# Patient Record
Sex: FEMALE | Race: WHITE | NOT HISPANIC OR LATINO | Employment: OTHER | URBAN - METROPOLITAN AREA
[De-identification: names, ages, dates, MRNs, and addresses within clinical notes are randomized per-mention and may not be internally consistent; named-entity substitution may affect disease eponyms.]

---

## 2017-01-29 ENCOUNTER — DOCUMENTATION ONLY (OUTPATIENT)
Dept: FAMILY MEDICINE | Facility: CLINIC | Age: 72
End: 2017-01-29

## 2017-01-29 NOTE — PROGRESS NOTES
Pre-Visit Chart Review  For Appointment Scheduled on 01/30/2017    Health Maintenance Due   Topic Date Due    Hepatitis C Screening  1945    Mammogram  11/09/1986    DEXA SCAN  11/09/1986    Colonoscopy  11/09/1996    Zoster Vaccine  11/09/2006    Pneumococcal (65+) (2 of 2 - PPSV23) 10/19/2016

## 2017-01-30 ENCOUNTER — OFFICE VISIT (OUTPATIENT)
Dept: FAMILY MEDICINE | Facility: CLINIC | Age: 72
End: 2017-01-30
Payer: MEDICARE

## 2017-01-30 VITALS
WEIGHT: 147.44 LBS | DIASTOLIC BLOOD PRESSURE: 72 MMHG | SYSTOLIC BLOOD PRESSURE: 125 MMHG | BODY MASS INDEX: 25.17 KG/M2 | HEART RATE: 54 BPM | HEIGHT: 64 IN | TEMPERATURE: 98 F

## 2017-01-30 DIAGNOSIS — L24.9 IRRITANT CONTACT DERMATITIS, UNSPECIFIED TRIGGER: Primary | ICD-10-CM

## 2017-01-30 PROCEDURE — 99213 OFFICE O/P EST LOW 20 MIN: CPT | Mod: PBBFAC,PO | Performed by: PHYSICIAN ASSISTANT

## 2017-01-30 PROCEDURE — 99213 OFFICE O/P EST LOW 20 MIN: CPT | Mod: S$PBB,,, | Performed by: PHYSICIAN ASSISTANT

## 2017-01-30 PROCEDURE — 99999 PR PBB SHADOW E&M-EST. PATIENT-LVL III: CPT | Mod: PBBFAC,,, | Performed by: PHYSICIAN ASSISTANT

## 2017-01-30 RX ORDER — HYDROXYZINE HYDROCHLORIDE 25 MG/1
25 TABLET, FILM COATED ORAL NIGHTLY
Qty: 15 TABLET | Refills: 0 | Status: SHIPPED | OUTPATIENT
Start: 2017-01-30 | End: 2017-08-09

## 2017-01-30 NOTE — PROGRESS NOTES
Subjective:       Patient ID: Liliana Hairston is a 71 y.o. female.    Chief Complaint: Rash (red, itching, right abdominal x 1 week)    Rash   This is a new problem. The current episode started in the past 7 days. The problem is unchanged. The affected locations include the torso. The rash is characterized by itchiness and dryness. Pertinent negatives include no fever. Past treatments include topical steroids. The treatment provided mild relief.     Review of Systems   Constitutional: Negative for chills and fever.   Skin: Positive for rash.       Objective:      Physical Exam   Constitutional: She appears well-developed and well-nourished. No distress.   Skin:        Vitals reviewed.      Assessment:       1. Irritant contact dermatitis, unspecified trigger        Plan:       Liliana was seen today for rash.    Diagnoses and all orders for this visit:    Irritant contact dermatitis, unspecified trigger      -     hydrOXYzine HCl (ATARAX) 25 MG tablet; Take 1 tablet (25 mg total) by mouth every evening.    Continue hydrocortisone bid   Liliana Hairston was given a handout which discussed their disease process, precautions, and instructions for follow-up and therapy.

## 2017-01-30 NOTE — PATIENT INSTRUCTIONS
Nonspecific Dermatitis  Dermatitis is a skin rash caused by something that touches the skin and makes it irritated and inflamed.  Your skin may be red, swollen, dry, and may be cracked. Blisters may form and ooze. The rash will itch.  Dermatitis can form on the face and neck, backs of hands, forearms, genitals, and lower legs. Dermatitis is not passed from person to person.  Talk with your health care provider about what may have caused the rash. Common things that cause skin allergies are metal in jewelry, plants like poison ivy or poison oak, and certain skin care products. You will need to avoid the source of your rash in the future to prevent it from coming back. In some cases, the cause of the dermatitis may not be found.  Treatment is done to relieve itching and prevent the rash from coming back. The rash should go away in a few days to a few weeks.  Home care  The health care provider may prescribe medications to relieve swelling and itching. Follow all instructions when using these medications.  · Avoid anything that heats up your skin, such as hot showers or baths, or direct sunlight. This can make itching worse.  · Stay away from whatever you think caused the rash.  · Bathe in warm, not hot, water. Apply a moisturizing lotion after bathing to prevent dry skin.  · Avoid skin irritants such as wool or silk clothing, grease, oils, harsh soaps, and detergents.  · Apply cold compresses to soothe your sores to help relieve your symptoms. Do this for 30 minutes 3 to 4 times a day. You can make a cold compress by soaking a cloth in cold water. Squeeze out excess water. You can add colloidal oatmeal to the water to help reduce itching. For severe itching in a small area, apply an ice pack wrapped in a thin towel. Do this for 20 minutes 3 to 4 times a day.  · You can also help relieve large areas of itching by taking a lukewarm bath with colloidal oatmeal added to the water.  · Use hydrocortisone cream for redness  and irritation, unless another medicine was prescribed. You can also use benzocaine anesthetic cream or spray.  · Use oral diphenhydramine to help reduce itching. This is an antihistamine you can buy at drug and grocery stores. It can make you sleepy, so use lower doses during the daytime. Or you can use loratadine. This is an antihistamine that will not make you sleepy. Dont use diphenhydramine if you have glaucoma or have trouble urinating because of an enlarged prostate.  · Wash your hands or use an antibacterial gel often to prevent the spread of the rash.  Follow-up care  Follow up with your health care provider. Make an appointment with your health care provider if your symptoms do not get better in the next 1 to 2 weeks.  When to seek medical advice  Call your health care provider right away if any of these occur:  · Spreading of the rash to other parts of your body  · Severe swelling of your face, eyelids, mouth, throat or tongue  · Trouble urinating due to swelling in the genital area  · Fever of 100.4°F (38°C) or higher  · Redness or swelling that gets worse  · Pain that gets worse  · Foul-smelling fluid leaking from the skin  · Yellow-brown crusts on the open blisters  · Joint pain   © 1056-1658 [employer] [address] [place] [address], [place] [address]. All rights reserved. This information is not intended as a substitute for professional medical care. Always follow your healthcare professional's instructions.

## 2017-01-30 NOTE — MR AVS SNAPSHOT
[facility]  [address]  [place] [address]  Phone: [phone]  Fax: [phone]                  Liliana Hairston   2017 8:20 AM   Office Visit    Description:  Female : 1945   Provider:  DARLING Ray   Department:  [facility]           Reason for Visit     Rash           Diagnoses this Visit        Comments    Irritant contact dermatitis, unspecified trigger    -  Primary            To Do List           Goals (5 Years of Data)     None       These Medications        Disp Refills Start End    hydrOXYzine HCl (ATARAX) 25 MG tablet 15 tablet 0 2017     Take 1 tablet (25 mg total) by mouth every evening. - Oral    Pharmacy: [id number]  [address]  #: [phone]         [facility]     [facility] -  Assistance  Registered nurses in the [facility] Center provide clinical advisement, health education, appointment booking, and other advisory services.  Call for this free service at [phone].             Medications           Message regarding Medications     Verify the changes and/or additions to your medication regime listed below are the same as discussed with your clinician today.  If any of these changes or additions are incorrect, please notify your healthcare provider.        START taking these NEW medications        Refills    hydrOXYzine HCl (ATARAX) 25 MG tablet 0    Sig: Take 1 tablet (25 mg total) by mouth every evening.    Class: Normal    Route: Oral           Verify that the below list of medications is an accurate representation of the medications you are currently taking.  If none reported, the list may be blank. If incorrect, please contact your healthcare provider. Carry this list with you in case of emergency.           Current Medications     anastrozole (ARIMIDEX) 1 mg Tab Take 1 mg by mouth once daily.    aspirin (ECOTRIN) 81 MG EC tablet  "Take 81 mg by mouth once daily.    azelastine (ASTELIN) 137 mcg (0.1 %) nasal spray 1 spray (137 mcg total) by Nasal route 2 (two) times daily.    CALCIUM CARBONATE/VITAMIN D3 (CALCIUM 500 + D ORAL) Take by mouth.    cyanocobalamin (VITAMIN B-12) 1000 MCG tablet Take 100 mcg by mouth once daily.    dicyclomine (BENTYL) 10 MG capsule Take 10 mg by mouth 4 (four) times daily before meals and nightly.    glucosamine HCl 1,500 mg Tab Take by mouth.    LACTOBACILLUS ACIDOPHILUS (PROBIOTIC ORAL) Take by mouth.    hydrOXYzine HCl (ATARAX) 25 MG tablet Take 1 tablet (25 mg total) by mouth every evening.           Clinical Reference Information           Vital Signs - Last Recorded  Most recent update: 1/30/2017  8:25 AM by Ayanna De Jesus MA    BP Pulse Temp Ht Wt BMI    125/72 (BP Location: Left arm, Patient Position: Sitting, BP Method: Automatic) (!) 54 98.3 °F (36.8 °C) (Oral) 5' 4" (1.626 m) 66.9 kg (147 lb 7.5 oz) 25.31 kg/m2      Blood Pressure          Most Recent Value    BP  125/72      Allergies as of 1/30/2017     No Known Allergies      Immunizations Administered on Date of Encounter - 1/30/2017     None      Instructions      Nonspecific Dermatitis  Dermatitis is a skin rash caused by something that touches the skin and makes it irritated and inflamed.  Your skin may be red, swollen, dry, and may be cracked. Blisters may form and ooze. The rash will itch.  Dermatitis can form on the face and neck, backs of hands, forearms, genitals, and lower legs. Dermatitis is not passed from person to person.  Talk with your health care provider about what may have caused the rash. Common things that cause skin allergies are metal in jewelry, plants like poison ivy or poison oak, and certain skin care products. You will need to avoid the source of your rash in the future to prevent it from coming back. In some cases, the cause of the dermatitis may not be found.  Treatment is done to relieve itching and prevent the rash " from coming back. The rash should go away in a few days to a few weeks.  Home care  The health care provider may prescribe medications to relieve swelling and itching. Follow all instructions when using these medications.  · Avoid anything that heats up your skin, such as hot showers or baths, or direct sunlight. This can make itching worse.  · Stay away from whatever you think caused the rash.  · Bathe in warm, not hot, water. Apply a moisturizing lotion after bathing to prevent dry skin.  · Avoid skin irritants such as wool or silk clothing, grease, oils, harsh soaps, and detergents.  · Apply cold compresses to soothe your sores to help relieve your symptoms. Do this for 30 minutes 3 to 4 times a day. You can make a cold compress by soaking a cloth in cold water. Squeeze out excess water. You can add colloidal oatmeal to the water to help reduce itching. For severe itching in a small area, apply an ice pack wrapped in a thin towel. Do this for 20 minutes 3 to 4 times a day.  · You can also help relieve large areas of itching by taking a lukewarm bath with colloidal oatmeal added to the water.  · Use hydrocortisone cream for redness and irritation, unless another medicine was prescribed. You can also use benzocaine anesthetic cream or spray.  · Use oral diphenhydramine to help reduce itching. This is an antihistamine you can buy at drug and grocery stores. It can make you sleepy, so use lower doses during the daytime. Or you can use loratadine. This is an antihistamine that will not make you sleepy. Dont use diphenhydramine if you have glaucoma or have trouble urinating because of an enlarged prostate.  · Wash your hands or use an antibacterial gel often to prevent the spread of the rash.  Follow-up care  Follow up with your health care provider. Make an appointment with your health care provider if your symptoms do not get better in the next 1 to 2 weeks.  When to seek medical advice  Call your health care  provider right away if any of these occur:  · Spreading of the rash to other parts of your body  · Severe swelling of your face, eyelids, mouth, throat or tongue  · Trouble urinating due to swelling in the genital area  · Fever of 100.4°F (38°C) or higher  · Redness or swelling that gets worse  · Pain that gets worse  · Foul-smelling fluid leaking from the skin  · Yellow-brown crusts on the open blisters  · Joint pain   © 7135-1405 [employer] [address] [place] [address], [place] [address]. All rights reserved. This information is not intended as a substitute for professional medical care. Always follow your healthcare professional's instructions.

## 2017-08-09 ENCOUNTER — HOSPITAL ENCOUNTER (OUTPATIENT)
Dept: RADIOLOGY | Facility: CLINIC | Age: 72
Discharge: HOME | End: 2017-08-09
Attending: FAMILY MEDICINE
Payer: MEDICARE

## 2017-08-09 ENCOUNTER — OFFICE VISIT (OUTPATIENT)
Dept: FAMILY MEDICINE | Facility: CLINIC | Age: 72
End: 2017-08-09
Payer: MEDICARE

## 2017-08-09 VITALS
RESPIRATION RATE: 18 BRPM | DIASTOLIC BLOOD PRESSURE: 71 MMHG | SYSTOLIC BLOOD PRESSURE: 116 MMHG | BODY MASS INDEX: 25.82 KG/M2 | HEART RATE: 68 BPM | HEIGHT: 64 IN | WEIGHT: 151.25 LBS | TEMPERATURE: 99 F

## 2017-08-09 DIAGNOSIS — M25.561 PAIN IN JOINT OF RIGHT KNEE: Primary | ICD-10-CM

## 2017-08-09 DIAGNOSIS — M25.561 PAIN IN JOINT OF RIGHT KNEE: ICD-10-CM

## 2017-08-09 DIAGNOSIS — Z11.59 NEED FOR HEPATITIS C SCREENING TEST: ICD-10-CM

## 2017-08-09 PROCEDURE — 73560 X-RAY EXAM OF KNEE 1 OR 2: CPT | Mod: 26,59,LT,S$GLB | Performed by: RADIOLOGY

## 2017-08-09 PROCEDURE — 1125F AMNT PAIN NOTED PAIN PRSNT: CPT | Mod: ,,, | Performed by: FAMILY MEDICINE

## 2017-08-09 PROCEDURE — 73560 X-RAY EXAM OF KNEE 1 OR 2: CPT | Mod: TC,PO,LT

## 2017-08-09 PROCEDURE — 3008F BODY MASS INDEX DOCD: CPT | Mod: ,,, | Performed by: FAMILY MEDICINE

## 2017-08-09 PROCEDURE — 1159F MED LIST DOCD IN RCRD: CPT | Mod: ,,, | Performed by: FAMILY MEDICINE

## 2017-08-09 PROCEDURE — 73562 X-RAY EXAM OF KNEE 3: CPT | Mod: 26,RT,S$GLB, | Performed by: RADIOLOGY

## 2017-08-09 PROCEDURE — 99999 PR PBB SHADOW E&M-EST. PATIENT-LVL III: CPT | Mod: PBBFAC,,, | Performed by: FAMILY MEDICINE

## 2017-08-09 PROCEDURE — 99214 OFFICE O/P EST MOD 30 MIN: CPT | Mod: S$PBB,,, | Performed by: FAMILY MEDICINE

## 2017-08-09 NOTE — PROGRESS NOTES
Subjective:       Patient ID: Liliana Hairston is a 71 y.o. female.    Chief Complaint: Knee Pain    Onset 4 days ago.  Was at end of a cruise; had been walking a lot but no type of trauma occurred.  No other joints hurting now and no previous pain like this.      Knee Pain    There was no injury mechanism. The pain is present in the right knee. The quality of the pain is described as aching. The pain is severe. The pain has been constant since onset. Pertinent negatives include no loss of motion, muscle weakness or numbness. The symptoms are aggravated by movement and weight bearing. She has tried NSAIDs, ice and rest for the symptoms. The treatment provided mild relief.     Review of Systems   Constitutional: Negative for fever.   Eyes: Negative for discharge and redness.   Respiratory: Negative for shortness of breath.         No pleuritic pain.   Cardiovascular: Negative for chest pain, palpitations and leg swelling.   Gastrointestinal: Negative for abdominal pain and nausea.   Genitourinary: Negative for dysuria.   Musculoskeletal:        No calf pain.   Skin: Negative for rash.   Neurological: Negative for numbness.   All other systems reviewed and are negative.      Objective:      Physical Exam   Constitutional: She appears well-developed and well-nourished.   Eyes: Pupils are equal, round, and reactive to light. No scleral icterus.   Neck: Neck supple.   Cardiovascular: Normal rate and regular rhythm.    No murmur heard.  Pulmonary/Chest: Effort normal and breath sounds normal.   Musculoskeletal: She exhibits no edema.        Right knee: She exhibits no LCL laxity and no MCL laxity. Tenderness found. Medial joint line tenderness noted.        Legs:  Left >> right knee has palpable crepitance during palpation with ROM.   Lymphadenopathy:     She has no cervical adenopathy.   Skin: Skin is warm and dry.       Assessment:       1. Pain in joint of right knee    2. Need for hepatitis C screening test         Plan:         Liliana was seen today for knee pain.    Diagnoses and all orders for this visit:    Pain in joint of right knee  -     X-Ray Knee 3 View Right; Future  -     Uric acid; Future    Need for hepatitis C screening test  -     Hepatitis C antibody; Future      Patient Instructions   Aleve 1-2 pills twice a day.  OR Ibuprofen 600 mg three times with food.  Rest  Moist heat three times a day.

## 2017-08-09 NOTE — PATIENT INSTRUCTIONS
Aleve 1-2 pills twice a day.  OR Ibuprofen 600 mg three times with food.  Rest  Moist heat three times a day.

## 2017-08-16 ENCOUNTER — OFFICE VISIT (OUTPATIENT)
Dept: ORTHOPEDICS | Facility: CLINIC | Age: 72
End: 2017-08-16
Payer: MEDICARE

## 2017-08-16 VITALS
DIASTOLIC BLOOD PRESSURE: 86 MMHG | BODY MASS INDEX: 25.78 KG/M2 | HEART RATE: 64 BPM | WEIGHT: 151 LBS | HEIGHT: 64 IN | SYSTOLIC BLOOD PRESSURE: 128 MMHG

## 2017-08-16 DIAGNOSIS — I82.431 ACUTE DEEP VEIN THROMBOSIS (DVT) OF POPLITEAL VEIN OF RIGHT LOWER EXTREMITY: Primary | ICD-10-CM

## 2017-08-16 DIAGNOSIS — M17.11 PRIMARY OSTEOARTHRITIS OF RIGHT KNEE: ICD-10-CM

## 2017-08-16 PROBLEM — I82.439 ACUTE DEEP VEIN THROMBOSIS (DVT) OF POPLITEAL VEIN: Status: ACTIVE | Noted: 2017-08-16

## 2017-08-16 PROCEDURE — 1159F MED LIST DOCD IN RCRD: CPT | Mod: ,,, | Performed by: ORTHOPAEDIC SURGERY

## 2017-08-16 PROCEDURE — 1125F AMNT PAIN NOTED PAIN PRSNT: CPT | Mod: ,,, | Performed by: ORTHOPAEDIC SURGERY

## 2017-08-16 PROCEDURE — 99203 OFFICE O/P NEW LOW 30 MIN: CPT | Mod: 25,,, | Performed by: ORTHOPAEDIC SURGERY

## 2017-08-16 PROCEDURE — 20610 DRAIN/INJ JOINT/BURSA W/O US: CPT | Mod: RT,,, | Performed by: ORTHOPAEDIC SURGERY

## 2017-08-16 RX ORDER — TRIAMCINOLONE ACETONIDE 40 MG/ML
40 INJECTION, SUSPENSION INTRA-ARTICULAR; INTRAMUSCULAR
Status: DISCONTINUED | OUTPATIENT
Start: 2017-08-16 | End: 2017-08-16 | Stop reason: HOSPADM

## 2017-08-16 RX ADMIN — TRIAMCINOLONE ACETONIDE 40 MG: 40 INJECTION, SUSPENSION INTRA-ARTICULAR; INTRAMUSCULAR at 03:08

## 2017-08-16 NOTE — PROGRESS NOTES
Past Medical History:   Diagnosis Date    Cancer     breast, right '97    Cataract        Past Surgical History:   Procedure Laterality Date    APPENDECTOMY      BREAST SURGERY      EYE SURGERY      HYSTERECTOMY      LUNG SURGERY  1998    remove cyst on top of right lobe       Current Outpatient Prescriptions   Medication Sig    anastrozole (ARIMIDEX) 1 mg Tab Take 1 mg by mouth once daily.    aspirin (ECOTRIN) 81 MG EC tablet Take 81 mg by mouth once daily.    CALCIUM CARBONATE/VITAMIN D3 (CALCIUM 500 + D ORAL) Take by mouth.    cyanocobalamin (VITAMIN B-12) 1000 MCG tablet Take 100 mcg by mouth once daily.    dicyclomine (BENTYL) 10 MG capsule Take 10 mg by mouth 4 (four) times daily before meals and nightly.    glucosamine HCl 1,500 mg Tab Take by mouth.    LACTOBACILLUS ACIDOPHILUS (PROBIOTIC ORAL) Take by mouth.    azelastine (ASTELIN) 137 mcg (0.1 %) nasal spray 1 spray (137 mcg total) by Nasal route 2 (two) times daily.     No current facility-administered medications for this visit.        Review of patient's allergies indicates:  No Known Allergies    History reviewed. No pertinent family history.    Social History     Social History    Marital status:      Spouse name: N/A    Number of children: N/A    Years of education: N/A     Occupational History    Not on file.     Social History Main Topics    Smoking status: Never Smoker    Smokeless tobacco: Never Used    Alcohol use Yes    Drug use: No    Sexual activity: Not on file     Other Topics Concern    Not on file     Social History Narrative    No narrative on file       Chief Complaint:   Chief Complaint   Patient presents with    Right Knee - Pain     Patient woke up about 2 weeks ago after doing a lot of walking on [place] from [place] She started having pain. No Injury just woke up with pain at the medial side       Consulting Physician: No ref. provider found    History of Present Illness:    This is a  "71 y.o. year old female who complains of The patient was on a trip approximately 2 weeks in [place] during a trip she developed some pain in her left knee and allergies also been complaining of posterior knee pain      ROS    Examination:    Vital Signs:    Vitals:    08/16/17 1516   BP: 128/86   Pulse: 64   Weight: 68.5 kg (151 lb)   Height: 5' 4" (1.626 m)   PainSc:   6   PainLoc: Knee       This a well-developed, well nourished patient in no acute distress.    Alert and oriented and cooperative to examination.       Physical Exam: Right Knee Exam    Gait:   Normal    Skin  Rash:   None  Scars:   None    Inspection  Erythema:  None  Bruising:  None  Effusion:  moderate  Masses:  None  Lymphadenopathy: None    Range of Motion: 0 to 130°    Medial Joint : positive  Lateral Joint : None    Patellofemoral Tenderness: None  Patellofemoral Crepitus: None    Lachman:  Normal  Anterior Drawer: Normal  Posterior Drawer: Normal    Miriam's:  Negative  Apley's:  Negative    Varus Stress:  Stable  Valgus Stress:  Stable    Strength:  5/5    Pulses:  Palpable distal    Sensation:  Intact          Imaging: X-rays ordered and reviewed today an x-ray of her right knee show some mild degenerative changes no acute changes     Assessment: synovitis and arthritis to the right knee rule out DVT rightpopliteal area    Plan:  Will aspirate and inject her right knee will also get a venous Doppler to rule out a DVT in the right leg      DISCLAIMER: This note may have been dictated using voice recognition software and may contain grammatical errors.     NOTE: Consult report sent to referring provider via EP[facility] EMR.  "

## 2017-08-16 NOTE — PROCEDURES
Large Joint Aspiration/Injection  Date/Time: 8/16/2017 3:56 PM  Performed by: MOISÉS GUZMAN  Authorized by: MOISÉS GUZMAN     Consent Done?:  Yes (Verbal)  Indications:  Pain and joint swelling  Procedure site marked: Yes    Timeout: Prior to procedure the correct patient, procedure, and site was verified      Location:  Knee  Site:  R knee  Prep: Patient was prepped and draped in usual sterile fashion    Needle size:  22 G  Approach:  Lateral  Medications:  40 mg triamcinolone acetonide 40 mg/mL  Patient tolerance:  Patient tolerated the procedure well with no immediate complications

## 2017-08-23 ENCOUNTER — OFFICE VISIT (OUTPATIENT)
Dept: ORTHOPEDICS | Facility: CLINIC | Age: 72
End: 2017-08-23
Payer: MEDICARE

## 2017-08-23 VITALS
DIASTOLIC BLOOD PRESSURE: 76 MMHG | BODY MASS INDEX: 25.78 KG/M2 | HEIGHT: 64 IN | WEIGHT: 151 LBS | SYSTOLIC BLOOD PRESSURE: 132 MMHG | HEART RATE: 74 BPM

## 2017-08-23 DIAGNOSIS — M17.11 PRIMARY OSTEOARTHRITIS OF RIGHT KNEE: Primary | ICD-10-CM

## 2017-08-23 PROCEDURE — 1126F AMNT PAIN NOTED NONE PRSNT: CPT | Mod: ,,, | Performed by: ORTHOPAEDIC SURGERY

## 2017-08-23 PROCEDURE — 99213 OFFICE O/P EST LOW 20 MIN: CPT | Mod: ,,, | Performed by: ORTHOPAEDIC SURGERY

## 2017-08-23 PROCEDURE — 1159F MED LIST DOCD IN RCRD: CPT | Mod: ,,, | Performed by: ORTHOPAEDIC SURGERY

## 2017-08-23 NOTE — PROGRESS NOTES
Past Medical History:   Diagnosis Date    Cancer     breast, right '97    Cataract        Past Surgical History:   Procedure Laterality Date    APPENDECTOMY      BREAST SURGERY      EYE SURGERY      HYSTERECTOMY      LUNG SURGERY  1998    remove cyst on top of right lobe       Current Outpatient Prescriptions   Medication Sig    anastrozole (ARIMIDEX) 1 mg Tab Take 1 mg by mouth once daily.    aspirin (ECOTRIN) 81 MG EC tablet Take 81 mg by mouth once daily.    CALCIUM CARBONATE/VITAMIN D3 (CALCIUM 500 + D ORAL) Take by mouth.    cyanocobalamin (VITAMIN B-12) 1000 MCG tablet Take 100 mcg by mouth once daily.    dicyclomine (BENTYL) 10 MG capsule Take 10 mg by mouth 4 (four) times daily before meals and nightly.    glucosamine HCl 1,500 mg Tab Take by mouth.    LACTOBACILLUS ACIDOPHILUS (PROBIOTIC ORAL) Take by mouth.    azelastine (ASTELIN) 137 mcg (0.1 %) nasal spray 1 spray (137 mcg total) by Nasal route 2 (two) times daily.     No current facility-administered medications for this visit.        Review of patient's allergies indicates:  No Known Allergies    History reviewed. No pertinent family history.    Social History     Social History    Marital status:      Spouse name: N/A    Number of children: N/A    Years of education: N/A     Occupational History    Not on file.     Social History Main Topics    Smoking status: Never Smoker    Smokeless tobacco: Never Used    Alcohol use Yes    Drug use: No    Sexual activity: Not on file     Other Topics Concern    Not on file     Social History Narrative    No narrative on file       Chief Complaint:   Chief Complaint   Patient presents with    Right Knee - Pain, Follow-up     On 08/16/2017 had U/S t rule out DVT it was negative she was also given and inj of cortisone and aspiration of the right knee. synovitis and arthritis to the right knee. Patient is doing much better.       Consulting Physician: No ref. provider found    History  "of Present Illness:    This is a 71 y.o. year old female who complains of Patient returns today for follow-up of her right knee she states her knee is much improved she had a ultrasound which is negative for DVT      ROS    Examination:    Vital Signs:    Vitals:    08/23/17 0835   BP: 132/76   Pulse: 74   Weight: 68.5 kg (151 lb)   Height: 5' 4" (1.626 m)   PainSc: 0-No pain   PainLoc: Knee       This a well-developed, well nourished patient in no acute distress.    Alert and oriented and cooperative to examination.       Physical Exam: Right Knee Exam    Gait:   Normal    Skin  Rash:   None  Scars:   None    Inspection  Erythema:  None  Bruising:  None  Effusion:  None  Masses:  None  Lymphadenopathy: None    Range of Motion: 0 to 130°    Medial Joint : None  Lateral Joint : None    Patellofemoral Tenderness: None  Patellofemoral Crepitus: None    Lachman:  Normal  Anterior Drawer: Normal  Posterior Drawer: Normal    Miriam's:  Negative  Apley's:  Negative    Varus Stress:  Stable  Valgus Stress:  Stable    Strength:  5/5    Pulses:  Palpable distal    Sensation:  Intact          Imaging: X-rays ordered and reviewed today a recent ultrasound of the right leg was negative for DVT     Assessment: ild arthritis right knee    Plan:  atient is to take anti-inflammatories like Aleve or Advil and increase her activities will see her back in about 4 weeks if she does have increased symptoms we'll consider an MRI      DISCLAIMER: This note may have been dictated using voice recognition software and may contain grammatical errors.     NOTE: Consult report sent to referring provider via EP[facility] EMR.  "

## 2017-08-24 DIAGNOSIS — Z12.11 COLON CANCER SCREENING: ICD-10-CM

## 2017-09-20 ENCOUNTER — OFFICE VISIT (OUTPATIENT)
Dept: ORTHOPEDICS | Facility: CLINIC | Age: 72
End: 2017-09-20
Payer: MEDICARE

## 2017-09-20 VITALS
SYSTOLIC BLOOD PRESSURE: 120 MMHG | HEART RATE: 72 BPM | WEIGHT: 151 LBS | BODY MASS INDEX: 25.78 KG/M2 | HEIGHT: 64 IN | DIASTOLIC BLOOD PRESSURE: 64 MMHG

## 2017-09-20 DIAGNOSIS — M17.11 PRIMARY OSTEOARTHRITIS OF RIGHT KNEE: Primary | ICD-10-CM

## 2017-09-20 PROCEDURE — 1159F MED LIST DOCD IN RCRD: CPT | Mod: ,,, | Performed by: ORTHOPAEDIC SURGERY

## 2017-09-20 PROCEDURE — 99213 OFFICE O/P EST LOW 20 MIN: CPT | Mod: ,,, | Performed by: ORTHOPAEDIC SURGERY

## 2017-09-20 PROCEDURE — 1125F AMNT PAIN NOTED PAIN PRSNT: CPT | Mod: ,,, | Performed by: ORTHOPAEDIC SURGERY

## 2017-09-20 NOTE — PROGRESS NOTES
Past Medical History:   Diagnosis Date    Cancer     breast, right '97    Cataract        Past Surgical History:   Procedure Laterality Date    APPENDECTOMY      BREAST SURGERY      EYE SURGERY      HYSTERECTOMY      LUNG SURGERY  1998    remove cyst on top of right lobe       Current Outpatient Prescriptions   Medication Sig    anastrozole (ARIMIDEX) 1 mg Tab Take 1 mg by mouth once daily.    aspirin (ECOTRIN) 81 MG EC tablet Take 81 mg by mouth once daily.    CALCIUM CARBONATE/VITAMIN D3 (CALCIUM 500 + D ORAL) Take by mouth.    cyanocobalamin (VITAMIN B-12) 1000 MCG tablet Take 100 mcg by mouth once daily.    dicyclomine (BENTYL) 10 MG capsule Take 10 mg by mouth 4 (four) times daily before meals and nightly.    glucosamine HCl 1,500 mg Tab Take by mouth.    LACTOBACILLUS ACIDOPHILUS (PROBIOTIC ORAL) Take by mouth.    azelastine (ASTELIN) 137 mcg (0.1 %) nasal spray 1 spray (137 mcg total) by Nasal route 2 (two) times daily.     No current facility-administered medications for this visit.        Review of patient's allergies indicates:  No Known Allergies    History reviewed. No pertinent family history.    Social History     Social History    Marital status:      Spouse name: N/A    Number of children: N/A    Years of education: N/A     Occupational History    Not on file.     Social History Main Topics    Smoking status: Never Smoker    Smokeless tobacco: Never Used    Alcohol use Yes    Drug use: No    Sexual activity: Not on file     Other Topics Concern    Not on file     Social History Narrative    No narrative on file       Chief Complaint:   Chief Complaint   Patient presents with    Right Knee - Pain, Follow-up     Patient had [place] on 08/16/2017. She also had a [place] Doppler that was negative. Patients pain today is minimal compared to what it has been. Patient has night pain       Consulting Physician: No ref. provider found    History of Present Illness:    This is  "a 71 y.o. year old female who complains of patient returns today for follow-up of her right knee she had an aspiration of the knee she also had a negative Doppler for DVT of the right leg she continues to have pain which she describes as being 6 out of 10 with positive nitrite      ROS    Examination:    Vital Signs:    Vitals:    09/20/17 1119   BP: 120/64   Pulse: 72   Weight: 68.5 kg (151 lb)   Height: 5' 4" (1.626 m)   PainSc:   1   PainLoc: Knee       This a well-developed, well nourished patient in no acute distress.    Alert and oriented and cooperative to examination.       Physical Exam: Right Knee Exam    Gait   Normal    Skin  Rash:   None  Scars:   None    Inspection  Erythema:  None  Bruising:  None  Effusion:  None  Masses:  None  Lymphadenopathy: None    Range of Motion: 0 to 130° with pain    Medial Joint : positive  Lateral Joint : negative    Patellofemoral Tenderness: None  Patellofemoral Crepitus: None    Lachman:  Normal  Anterior Drawer: Normal  Posterior Drawer: Normal    Miriam's:  positive  Apley's:  negative    Varus Stress:  Stable  Valgus Stress:  Stable    Strength:  5/5    Pulses:  Palpable distal    Sensation:  Intact          Imaging: X-rays ordered and reviewed today no x-rays done today     Assessment: possible medial meniscal tear of the right knee possible avascular necrosis of the right distal femur    Plan:  Recommend MRI of the right knee      DISCLAIMER: This note may have been dictated using voice recognition software and may contain grammatical errors.     NOTE: Consult report sent to referring provider via EP[facility] EMR.  "

## 2017-10-02 ENCOUNTER — OFFICE VISIT (OUTPATIENT)
Dept: ORTHOPEDICS | Facility: CLINIC | Age: 72
End: 2017-10-02
Payer: MEDICARE

## 2017-10-02 VITALS
SYSTOLIC BLOOD PRESSURE: 146 MMHG | BODY MASS INDEX: 25.78 KG/M2 | HEIGHT: 64 IN | HEART RATE: 65 BPM | WEIGHT: 151 LBS | DIASTOLIC BLOOD PRESSURE: 76 MMHG

## 2017-10-02 DIAGNOSIS — S83.241D ACUTE MEDIAL MENISCUS TEAR OF RIGHT KNEE, SUBSEQUENT ENCOUNTER: Primary | ICD-10-CM

## 2017-10-02 PROCEDURE — 1125F AMNT PAIN NOTED PAIN PRSNT: CPT | Mod: ,,, | Performed by: ORTHOPAEDIC SURGERY

## 2017-10-02 PROCEDURE — 1159F MED LIST DOCD IN RCRD: CPT | Mod: ,,, | Performed by: ORTHOPAEDIC SURGERY

## 2017-10-02 PROCEDURE — 99213 OFFICE O/P EST LOW 20 MIN: CPT | Mod: ,,, | Performed by: ORTHOPAEDIC SURGERY

## 2017-10-02 NOTE — PROGRESS NOTES
Past Medical History:   Diagnosis Date    Cancer     breast, right '97    Cataract        Past Surgical History:   Procedure Laterality Date    APPENDECTOMY      BREAST SURGERY      EYE SURGERY      HYSTERECTOMY      LUNG SURGERY  1998    remove cyst on top of right lobe       Current Outpatient Prescriptions   Medication Sig    anastrozole (ARIMIDEX) 1 mg Tab Take 1 mg by mouth once daily.    aspirin (ECOTRIN) 81 MG EC tablet Take 81 mg by mouth once daily.    CALCIUM CARBONATE/VITAMIN D3 (CALCIUM 500 + D ORAL) Take by mouth.    cyanocobalamin (VITAMIN B-12) 1000 MCG tablet Take 100 mcg by mouth once daily.    dicyclomine (BENTYL) 10 MG capsule Take 10 mg by mouth 4 (four) times daily before meals and nightly.    glucosamine HCl 1,500 mg Tab Take by mouth.    LACTOBACILLUS ACIDOPHILUS (PROBIOTIC ORAL) Take by mouth.    azelastine (ASTELIN) 137 mcg (0.1 %) nasal spray 1 spray (137 mcg total) by Nasal route 2 (two) times daily.     No current facility-administered medications for this visit.        Review of patient's allergies indicates:  No Known Allergies    History reviewed. No pertinent family history.    Social History     Social History    Marital status:      Spouse name: N/A    Number of children: N/A    Years of education: N/A     Occupational History    Not on file.     Social History Main Topics    Smoking status: Never Smoker    Smokeless tobacco: Never Used    Alcohol use Yes    Drug use: No    Sexual activity: Not on file     Other Topics Concern    Not on file     Social History Narrative    No narrative on file       Chief Complaint:   Chief Complaint   Patient presents with    Right Knee - Follow-up     She woke up sometime around 8/1/2017 after doing a lot of [place]  She is here to review her MRI results done on 9/27/2017.  Her knee is still having a lot of pain.  She has taken tylenol for the pain.       Consulting Physician:  "No ref. provider found    History of Present Illness:    This is a 71 y.o. year old female who complains of Reviewed the MRI of the right knee H shows a complex tear probably horizontal tear of the posterior medial meniscus with possibly a flipped fragment in the knee it showed also some moderate arthritic changes of the medial patella      ROS    Examination:    Vital Signs:    Vitals:    10/02/17 1023   BP: (!) 146/76   Pulse: 65   Weight: 68.5 kg (151 lb)   Height: 5' 4" (1.626 m)   PainSc:   2       This a well-developed, well nourished patient in no acute distress.    Alert and oriented and cooperative to examination.       Physical Exam: Right Knee Exam    Gait:   Normal    Skin  Rash:   None  Scars:   None    Inspection  Erythema:  None  Bruising:  None  Effusion:  None  Masses:  None  Lymphadenopathy: None    Range of Motion: 0 to 130°    Medial Joint : positive  Lateral Joint : None    Patellofemoral Tenderness: None  Patellofemoral Crepitus: None    Lachman:  Normal  Anterior Drawer: Normal  Posterior Drawer: Normal    Miriam's:  ositive  Apley's:  Negative    Varus Stress:  Stable  Valgus Stress:  Stable    Strength:  5/5    Pulses:  Palpable distal    Sensation:  Intact          Imaging: X-rays ordered and reviewed today review of her MRI today shows extensive tearing of posterior on a medial meniscus with possible fragment jamming into intracondylar notch area     Assessment: medial meniscal tear right knee    Plan:  Discussed arthroscopy with the patient patient wishes to have the knee scope      DISCLAIMER: This note may have been dictated using voice recognition software and may contain grammatical errors.     NOTE: Consult report sent to referring provider via EPIC EMR.  "

## 2017-10-06 ENCOUNTER — OFFICE VISIT (OUTPATIENT)
Dept: ORTHOPEDICS | Facility: CLINIC | Age: 72
End: 2017-10-06
Payer: MEDICARE

## 2017-10-06 LAB
ALBUMIN SERPL-MCNC: 4.4 G/DL (ref 3.1–4.7)
ALP SERPL-CCNC: 79 IU/L (ref 40–104)
ALT (SGPT): 20 IU/L (ref 3–33)
AST SERPL-CCNC: 20 IU/L (ref 10–40)
BASOPHILS NFR BLD: 0 K/UL (ref 0–0.2)
BASOPHILS NFR BLD: 0.5 %
BILIRUB SERPL-MCNC: 0.3 MG/DL (ref 0.3–1)
BUN SERPL-MCNC: 12 MG/DL (ref 8–20)
CALCIUM SERPL-MCNC: 9.5 MG/DL (ref 7.7–10.4)
CHLORIDE: 102 MMOL/L (ref 98–110)
CO2 SERPL-SCNC: 26.7 MMOL/L (ref 22.8–31.6)
CREATININE: 0.5 MG/DL (ref 0.6–1.4)
EOSINOPHIL NFR BLD: 0.1 K/UL (ref 0–0.7)
EOSINOPHIL NFR BLD: 1.7 %
ERYTHROCYTE [DISTWIDTH] IN BLOOD BY AUTOMATED COUNT: 12.4 % (ref 11.7–14.9)
GLUCOSE: 103 MG/DL (ref 70–99)
GRAN #: 5.9 K/UL (ref 1.4–6.5)
GRAN%: 69.2 %
HCT VFR BLD AUTO: 42.1 % (ref 36–48)
HGB BLD-MCNC: 14.2 G/DL (ref 12–15)
IMMATURE GRANS (ABS): 0 K/UL (ref 0–1)
IMMATURE GRANULOCYTES: 0.4 %
LYMPH #: 1.7 K/UL (ref 1.2–3.4)
LYMPH%: 20.6 %
MCH RBC QN AUTO: 31.7 PG (ref 25–35)
MCHC RBC AUTO-ENTMCNC: 33.7 G/DL (ref 31–36)
MCV RBC AUTO: 94 FL (ref 79–98)
MONO #: 0.6 K/UL (ref 0.1–0.6)
MONO%: 7.6 %
NUCLEATED RBCS: 0 %
PLATELET # BLD AUTO: 292 K/UL (ref 140–440)
PMV BLD AUTO: 9.3 FL (ref 8.8–12.7)
POTASSIUM SERPL-SCNC: 4 MMOL/L (ref 3.5–5)
PROT SERPL-MCNC: 7.5 G/DL (ref 6–8.2)
RBC # BLD AUTO: 4.48 M/UL (ref 3.5–5.5)
SODIUM: 137 MMOL/L (ref 134–144)
WBC # BLD AUTO: 8.5 K/UL (ref 5–10)

## 2017-10-18 ENCOUNTER — OFFICE VISIT (OUTPATIENT)
Dept: ORTHOPEDICS | Facility: CLINIC | Age: 72
End: 2017-10-18
Payer: MEDICARE

## 2017-10-18 VITALS
SYSTOLIC BLOOD PRESSURE: 113 MMHG | HEIGHT: 64 IN | DIASTOLIC BLOOD PRESSURE: 54 MMHG | HEART RATE: 82 BPM | WEIGHT: 151 LBS | BODY MASS INDEX: 25.78 KG/M2

## 2017-10-18 DIAGNOSIS — S83.241D ACUTE MEDIAL MENISCUS TEAR OF RIGHT KNEE, SUBSEQUENT ENCOUNTER: Primary | ICD-10-CM

## 2017-10-18 PROCEDURE — 99024 POSTOP FOLLOW-UP VISIT: CPT | Mod: ,,, | Performed by: ORTHOPAEDIC SURGERY

## 2017-10-18 NOTE — PROGRESS NOTES
Past Medical History:   Diagnosis Date    Cancer     breast, right '97    Cataract        Past Surgical History:   Procedure Laterality Date    APPENDECTOMY      BREAST SURGERY      EYE SURGERY      HYSTERECTOMY      LUNG SURGERY  1998    remove cyst on top of right lobe       Current Outpatient Prescriptions   Medication Sig    anastrozole (ARIMIDEX) 1 mg Tab Take 1 mg by mouth once daily.    aspirin (ECOTRIN) 81 MG EC tablet Take 81 mg by mouth once daily.    CALCIUM CARBONATE/VITAMIN D3 (CALCIUM 500 + D ORAL) Take by mouth.    cyanocobalamin (VITAMIN B-12) 1000 MCG tablet Take 100 mcg by mouth once daily.    dicyclomine (BENTYL) 10 MG capsule Take 10 mg by mouth 4 (four) times daily before meals and nightly.    glucosamine HCl 1,500 mg Tab Take by mouth.    LACTOBACILLUS ACIDOPHILUS (PROBIOTIC ORAL) Take by mouth.    azelastine (ASTELIN) 137 mcg (0.1 %) nasal spray 1 spray (137 mcg total) by Nasal route 2 (two) times daily.     No current facility-administered medications for this visit.        Review of patient's allergies indicates:  No Known Allergies    History reviewed. No pertinent family history.    Social History     Social History    Marital status:      Spouse name: N/A    Number of children: N/A    Years of education: N/A     Occupational History    Not on file.     Social History Main Topics    Smoking status: Never Smoker    Smokeless tobacco: Never Used    Alcohol use Yes    Drug use: No    Sexual activity: Not on file     Other Topics Concern    Not on file     Social History Narrative    No narrative on file       Chief Complaint:   Chief Complaint   Patient presents with    Post-op Evaluation     8 days post-op right knee.  DOS: 10/10/2017,partial medial meniscectomy chondroplasty and synovectomy right knee.  She is currently using a walker to ambulate and doing good.       Consulting Physician: No ref. provider found    History of Present Illness:    This is a  "71 y.o. year old female who complains of patient is a day status post right knee arthroscopy she has some minimal spot complaints of pain      ROS    Examination:    Vital Signs:    Vitals:    10/18/17 1004   BP: (!) 113/54   Pulse: 82   Weight: 68.5 kg (151 lb)   Height: 5' 4" (1.626 m)   PainSc:   1       This a well-developed, well nourished patient in no acute distress.    Alert and oriented and cooperative to examination.       Physical Exam: right knee-atient has a moderate effusion her incision is healing well was no drainage the patient does have some weakness in her quadriceps    Imaging: X-rays ordered and reviewed today no x-rays done     Assessment: status post arthroscopy to the right knee    Plan:  Patient instructed on straight leg raises and quadriceps exercises and range of motion she'll return to see me in 2 weeks      DISCLAIMER: This note may have been dictated using voice recognition software and may contain grammatical errors.     NOTE: Consult report sent to referring provider via EPIC EMR.  "

## 2017-11-01 ENCOUNTER — OFFICE VISIT (OUTPATIENT)
Dept: ORTHOPEDICS | Facility: CLINIC | Age: 72
End: 2017-11-01
Payer: MEDICARE

## 2017-11-01 VITALS
HEIGHT: 64 IN | WEIGHT: 151 LBS | BODY MASS INDEX: 25.78 KG/M2 | DIASTOLIC BLOOD PRESSURE: 72 MMHG | SYSTOLIC BLOOD PRESSURE: 116 MMHG | HEART RATE: 60 BPM

## 2017-11-01 DIAGNOSIS — M17.11 PRIMARY OSTEOARTHRITIS OF RIGHT KNEE: ICD-10-CM

## 2017-11-01 DIAGNOSIS — S83.241D ACUTE MEDIAL MENISCUS TEAR OF RIGHT KNEE, SUBSEQUENT ENCOUNTER: Primary | ICD-10-CM

## 2017-11-01 PROCEDURE — 20610 DRAIN/INJ JOINT/BURSA W/O US: CPT | Mod: 58,RT,, | Performed by: ORTHOPAEDIC SURGERY

## 2017-11-01 PROCEDURE — 99024 POSTOP FOLLOW-UP VISIT: CPT | Mod: ,,, | Performed by: ORTHOPAEDIC SURGERY

## 2017-11-01 RX ORDER — TRIAMCINOLONE ACETONIDE 40 MG/ML
40 INJECTION, SUSPENSION INTRA-ARTICULAR; INTRAMUSCULAR
Status: DISCONTINUED | OUTPATIENT
Start: 2017-11-01 | End: 2017-11-01 | Stop reason: HOSPADM

## 2017-11-01 RX ADMIN — TRIAMCINOLONE ACETONIDE 40 MG: 40 INJECTION, SUSPENSION INTRA-ARTICULAR; INTRAMUSCULAR at 11:11

## 2017-11-01 NOTE — PROGRESS NOTES
Past Medical History:   Diagnosis Date    Cancer     breast, right '97    Cataract        Past Surgical History:   Procedure Laterality Date    APPENDECTOMY      BREAST SURGERY      EYE SURGERY      HYSTERECTOMY      LUNG SURGERY  1998    remove cyst on top of right lobe       Current Outpatient Prescriptions   Medication Sig    anastrozole (ARIMIDEX) 1 mg Tab Take 1 mg by mouth once daily.    aspirin (ECOTRIN) 81 MG EC tablet Take 81 mg by mouth once daily.    CALCIUM CARBONATE/VITAMIN D3 (CALCIUM 500 + D ORAL) Take by mouth.    cyanocobalamin (VITAMIN B-12) 1000 MCG tablet Take 100 mcg by mouth once daily.    dicyclomine (BENTYL) 10 MG capsule Take 10 mg by mouth 4 (four) times daily before meals and nightly.    glucosamine HCl 1,500 mg Tab Take by mouth.    LACTOBACILLUS ACIDOPHILUS (PROBIOTIC ORAL) Take by mouth.    azelastine (ASTELIN) 137 mcg (0.1 %) nasal spray 1 spray (137 mcg total) by Nasal route 2 (two) times daily.     No current facility-administered medications for this visit.        Review of patient's allergies indicates:  No Known Allergies    History reviewed. No pertinent family history.    Social History     Social History    Marital status:      Spouse name: N/A    Number of children: N/A    Years of education: N/A     Occupational History    Not on file.     Social History Main Topics    Smoking status: Never Smoker    Smokeless tobacco: Never Used    Alcohol use Yes    Drug use: No    Sexual activity: Not on file     Other Topics Concern    Not on file     Social History Narrative    No narrative on file       Chief Complaint:   Chief Complaint   Patient presents with    Right Knee - Pain, Post-op Evaluation     3 weeks and 1 day post-op right knee.  DOS: 10/10/2017,partial medial meniscectomy chondroplasty and synovectomy right knee. Patient still having some stiffness.        Consulting Physician: No ref. provider found    History of Present Illness:    This  "is a 71 y.o. year old female who complains of patient is 3 weeks status post arthroscopy right knee she is found to have moderate degenerative arthritis of the medial compartment and patellofemoral compartment she complains of some swelling in the right knee joint she had a previous DVT study which was negative      ROS    Examination:    Vital Signs:    Vitals:    11/01/17 1023   BP: 116/72   Pulse: 60   Weight: 68.5 kg (151 lb)   Height: 5' 4" (1.626 m)   PainSc:   1   PainLoc: Knee       This a well-developed, well nourished patient in no acute distress.    Alert and oriented and cooperative to examination.       Physical Exam: right knee-mild effusion to right knee patient has good range of motion she has some mild swelling of the lower leg negative Homans sign    Imaging: X-rays ordered and reviewed today no x-rays done     Assessment: moderate arthritic changes of the right knee    Plan:  We'll aspirate the knee and inject with Kenalog consider Supartz injections in the future      DISCLAIMER: This note may have been dictated using voice recognition software and may contain grammatical errors.     NOTE: Consult report sent to referring provider via EPIC EMR.  "

## 2017-11-01 NOTE — PROCEDURES
Large Joint Aspiration/Injection  Date/Time: 11/1/2017 11:11 AM  Performed by: MOISÉS GUZMAN  Authorized by: MOISÉS GUZMAN     Consent Done?:  Yes (Verbal)  Indications:  Pain and joint swelling  Procedure site marked: Yes    Timeout: Prior to procedure the correct patient, procedure, and site was verified      Location:  Knee  Site:  R knee  Prep: Patient was prepped and draped in usual sterile fashion    Needle size:  22 G  Approach:  Lateral  Medications:  40 mg triamcinolone acetonide 40 mg/mL  Patient tolerance:  Patient tolerated the procedure well with no immediate complications

## 2017-11-29 ENCOUNTER — OFFICE VISIT (OUTPATIENT)
Dept: ORTHOPEDICS | Facility: CLINIC | Age: 72
End: 2017-11-29
Payer: MEDICARE

## 2017-11-29 VITALS
WEIGHT: 151 LBS | BODY MASS INDEX: 25.78 KG/M2 | DIASTOLIC BLOOD PRESSURE: 74 MMHG | SYSTOLIC BLOOD PRESSURE: 118 MMHG | HEART RATE: 70 BPM | HEIGHT: 64 IN

## 2017-11-29 DIAGNOSIS — M17.11 PRIMARY OSTEOARTHRITIS OF RIGHT KNEE: Primary | ICD-10-CM

## 2017-11-29 PROCEDURE — 99024 POSTOP FOLLOW-UP VISIT: CPT | Mod: ,,, | Performed by: ORTHOPAEDIC SURGERY

## 2017-11-29 NOTE — PROGRESS NOTES
Past Medical History:   Diagnosis Date    Cancer     breast, right '97    Cataract        Past Surgical History:   Procedure Laterality Date    APPENDECTOMY      BREAST SURGERY      EYE SURGERY      HYSTERECTOMY      LUNG SURGERY  1998    remove cyst on top of right lobe       Current Outpatient Prescriptions   Medication Sig    anastrozole (ARIMIDEX) 1 mg Tab Take 1 mg by mouth once daily.    aspirin (ECOTRIN) 81 MG EC tablet Take 81 mg by mouth once daily.    CALCIUM CARBONATE/VITAMIN D3 (CALCIUM 500 + D ORAL) Take by mouth.    cyanocobalamin (VITAMIN B-12) 1000 MCG tablet Take 100 mcg by mouth once daily.    dicyclomine (BENTYL) 10 MG capsule Take 10 mg by mouth 4 (four) times daily before meals and nightly.    glucosamine HCl 1,500 mg Tab Take by mouth.    LACTOBACILLUS ACIDOPHILUS (PROBIOTIC ORAL) Take by mouth.    azelastine (ASTELIN) 137 mcg (0.1 %) nasal spray 1 spray (137 mcg total) by Nasal route 2 (two) times daily.     No current facility-administered medications for this visit.        Review of patient's allergies indicates:  No Known Allergies    History reviewed. No pertinent family history.    Social History     Social History    Marital status:      Spouse name: N/A    Number of children: N/A    Years of education: N/A     Occupational History    Not on file.     Social History Main Topics    Smoking status: Never Smoker    Smokeless tobacco: Never Used    Alcohol use Yes    Drug use: No    Sexual activity: Not on file     Other Topics Concern    Not on file     Social History Narrative    No narrative on file       Chief Complaint:   Chief Complaint   Patient presents with    Post-op Evaluation     7 weeks and 1 day post-op right knee.  DOS: 10/10/2017, partial medial meniscectomy chondroplasty and synovectomy right knee. Patient still has minor stiffness, however there is no pain       Consulting Physician: No ref. provider found    History of Present  "Illness:    This is a 72 y.o. year old female who complains of Patient is 7 weeks status post arthroscopy of the right knee she was found to have posterior horn medial meniscal tear with moderate medial compartmental arthritis and moderate patellofemoral arthritis she states she has some stiffness but no pain      ROS    Examination:    Vital Signs:    Vitals:    11/29/17 1005   BP: 118/74   Pulse: 70   Weight: 68.5 kg (151 lb)   Height: 5' 4" (1.626 m)   PainSc: 0-No pain   PainLoc: Knee       This a well-developed, well nourished patient in no acute distress.    Alert and oriented and cooperative to examination.       Physical Exam: Right Knee Exam    Gait:   Normal    Skin  Rash:   None  Scars:   None    Inspection  Erythema:  None  Bruising:  None  Effusion:  None  Masses:  None  Lymphadenopathy: None    Range of Motion: 0 to 120    Medial Joint : None  Lateral Joint : None    Patellofemoral Tenderness: None  Patellofemoral Crepitus: None    Lachman:  Normal  Anterior Drawer: Normal  Posterior Drawer: Normal    Miriam's:  Negative  Apley's:  Negative    Varus Stress:  Stable  Valgus Stress:  Stable    Strength:  4/5mild quad atrophy    Pulses:  Palpable distal    Sensation:  Intact          Imaging: X-rays ordered and reviewed today no x-rays done today     Assessment: status post arthroscopy to the right knee doing well    Plan:  Patient's been instructed on straight leg raises and quadriceps exercises she is also "given a prescription for a knee sleeve      DISCLAIMER: This note may have been dictated using voice recognition software and may contain grammatical errors.     NOTE: Consult report sent to referring provider via EPIC EMR.  "

## 2017-12-19 DIAGNOSIS — H91.90 HEARING DIFFICULTY, UNSPECIFIED LATERALITY: Primary | ICD-10-CM

## 2017-12-20 ENCOUNTER — OFFICE VISIT (OUTPATIENT)
Dept: OTOLARYNGOLOGY | Facility: CLINIC | Age: 72
End: 2017-12-20
Payer: MEDICARE

## 2017-12-20 ENCOUNTER — CLINICAL SUPPORT (OUTPATIENT)
Dept: AUDIOLOGY | Facility: CLINIC | Age: 72
End: 2017-12-20
Payer: MEDICARE

## 2017-12-20 VITALS
SYSTOLIC BLOOD PRESSURE: 144 MMHG | WEIGHT: 151 LBS | HEIGHT: 64 IN | DIASTOLIC BLOOD PRESSURE: 76 MMHG | BODY MASS INDEX: 25.78 KG/M2

## 2017-12-20 DIAGNOSIS — H90.3 BILATERAL SENSORINEURAL HEARING LOSS: Primary | ICD-10-CM

## 2017-12-20 DIAGNOSIS — H90.3 BILATERAL SENSORINEURAL HEARING LOSS: ICD-10-CM

## 2017-12-20 DIAGNOSIS — H93.13 BILATERAL TINNITUS: ICD-10-CM

## 2017-12-20 DIAGNOSIS — H93.13 TINNITUS OF BOTH EARS: ICD-10-CM

## 2017-12-20 PROCEDURE — 99999 PR PBB SHADOW E&M-EST. PATIENT-LVL I: CPT | Mod: PBBFAC,,,

## 2017-12-20 PROCEDURE — 99213 OFFICE O/P EST LOW 20 MIN: CPT | Mod: PBBFAC,27,PO | Performed by: OTOLARYNGOLOGY

## 2017-12-20 PROCEDURE — 99999 PR PBB SHADOW E&M-EST. PATIENT-LVL III: CPT | Mod: PBBFAC,,, | Performed by: OTOLARYNGOLOGY

## 2017-12-20 PROCEDURE — 92567 TYMPANOMETRY: CPT | Mod: PBBFAC,PO | Performed by: AUDIOLOGIST

## 2017-12-20 PROCEDURE — 92557 COMPREHENSIVE HEARING TEST: CPT | Mod: PBBFAC,PO | Performed by: AUDIOLOGIST

## 2017-12-20 PROCEDURE — 99211 OFF/OP EST MAY X REQ PHY/QHP: CPT | Mod: PBBFAC,PO,25

## 2017-12-20 PROCEDURE — 99203 OFFICE O/P NEW LOW 30 MIN: CPT | Mod: S$PBB,,, | Performed by: OTOLARYNGOLOGY

## 2017-12-20 NOTE — PROGRESS NOTES
Subjective:       Patient ID: Liliana Hairston is a 72 y.o. female.    Chief Complaint: Tinnitus and Hearing Loss    Tinnitus: Has been going on for years, but worse recently. Equal and bilateral. Has not noted significant hearing loss other than in noisy crowds.     no pain, no otorrhea, no aural fullness, yes, as above tinnitus    Risk factors:  no Familial deafness   no Ototoxic medication exposure  no Acoustic trauma  no Head injury or trauma  no Otologic infection   no History of meningitis  no Ear surgery     Review of Systems   Constitutional: Negative for activity change and appetite change.   Eyes: Negative for discharge.   Respiratory: Negative for difficulty breathing and wheezing   Cardiovascular: Negative for chest pain.   Gastrointestinal: Negative for abdominal distention and abdominal pain.   Endocrine: Negative for cold intolerance and heat intolerance.   Genitourinary: Negative for dysuria.   Musculoskeletal: Negative for gait problem and joint swelling.   Skin: Negative for color change and pallor.   Neurological: Negative for syncope and weakness.   Psychiatric/Behavioral: Negative for agitation and confusion.     Objective:        Constitutional:   She is oriented to person, place, and time. She appears well-developed and well-nourished. She appears alert. She is active. Normal speech.      Head:  Normocephalic and atraumatic. Head is without TMJ tenderness. No scars. Salivary glands normal.  Facial strength is normal.      Ears:    Right Ear: No drainage or swelling. No middle ear effusion.   Left Ear: No drainage or swelling.  No middle ear effusion.     Nose:  No mucosal edema, rhinorrhea or sinus tenderness. No turbinate hypertrophy.      Mouth/Throat  Oropharynx clear and moist without lesions or asymmetry, normal uvula midline and mirror exam normal. Normal dentition. No uvula swelling, lacerations or trismus. No oropharyngeal exudate. Tonsillar erythema, tonsillar exudate.       Neck:  Full range of motion with neck supple and no adenopathy. Thyroid tenderness is present. No tracheal deviation, no edema, no erythema, normal range of motion, no stridor, no crepitus and no neck rigidity present. No thyroid mass present.     Cardiovascular:   Intact distal pulses and normal pulses.      Pulmonary/Chest:   Effort normal and breath sounds normal. No stridor.     Psychiatric:   Her speech is normal and behavior is normal. Her mood appears not anxious. Her affect is not labile.     Neurological:   She is alert and oriented to person, place, and time. No sensory deficit.     Skin:   No abrasions, lacerations, lesions, or rashes. No abrasion and no bruising noted.         Tests / Results:  I personally reviewed the audiogram and my findings reveal:   High freq bilateral symm SNHL            Assessment:       1. Bilateral sensorineural hearing loss    2. Tinnitus of both ears          Plan:         Liliana Hairston has bilateral tinnitus related to her high frequency hearing loss.  she is medically cleared for hearing aids and these may benefit her from a tinnitus perspective, but she may not need them yet for her hearing loss.    Discussed audio protection. Continue routine audiometric follow-ups.    Follow-up with me as needed.

## 2017-12-20 NOTE — PROGRESS NOTES
Liliana Hairston was seen 12/20/2017 for an audiological evaluation.     Patient reported that she has noticed difficulty hearing speech clearly when in crowds or noisy places. She has also had an intermittent ringing in both ears for quite a while but states that is has recently become constant.     Results revealed a mild-to-moderate sensorineural hearing loss for the right ear, and  mild-to-moderate sensorineural hearing loss for the left ear.    Speech Reception Thresholds were  25 dBHL for the right ear and 20 dBHL for the left ear.    Word recognition scores were excellent for the right ear and excellent for the left ear.   Tympanograms were Type A for the right ear and Type A for the left ear.    Audiogram results were reviewed in detail with patient and all questions were answered. Results will be reviewed by ENT at the completion of this note.   Recommend trial with binaural amplification pending medical clearance within the next one to three years and annual audiograms to monitor the hearing loss.

## 2018-01-19 ENCOUNTER — OFFICE VISIT (OUTPATIENT)
Dept: GASTROENTEROLOGY | Facility: CLINIC | Age: 73
End: 2018-01-19
Payer: MEDICARE

## 2018-01-19 VITALS
TEMPERATURE: 99 F | RESPIRATION RATE: 16 BRPM | DIASTOLIC BLOOD PRESSURE: 68 MMHG | WEIGHT: 147.94 LBS | BODY MASS INDEX: 25.25 KG/M2 | HEIGHT: 64 IN | HEART RATE: 73 BPM | SYSTOLIC BLOOD PRESSURE: 109 MMHG

## 2018-01-19 DIAGNOSIS — K92.1 BLOOD IN STOOL: ICD-10-CM

## 2018-01-19 DIAGNOSIS — R19.4 CHANGE IN BOWEL HABITS: ICD-10-CM

## 2018-01-19 DIAGNOSIS — R10.31 RLQ ABDOMINAL PAIN: Primary | ICD-10-CM

## 2018-01-19 DIAGNOSIS — R10.30 LOWER ABDOMINAL PAIN: ICD-10-CM

## 2018-01-19 PROCEDURE — 99205 OFFICE O/P NEW HI 60 MIN: CPT | Mod: S$PBB,,, | Performed by: INTERNAL MEDICINE

## 2018-01-19 PROCEDURE — 99213 OFFICE O/P EST LOW 20 MIN: CPT | Mod: PBBFAC,PO | Performed by: INTERNAL MEDICINE

## 2018-01-19 PROCEDURE — 99999 PR PBB SHADOW E&M-EST. PATIENT-LVL III: CPT | Mod: PBBFAC,,, | Performed by: INTERNAL MEDICINE

## 2018-01-19 NOTE — PROGRESS NOTES
[facility]     CC: Abdominal pain    HPI 72 y.o. female with history of breast cancer presents for evaluation of 3 weeks of intermittent, moderate, lower abdominal cramping worse with PO intake, associated with change in bowel habits, now having 2-3 small hard BMs a day. Her last colonoscopy was in 2012, which noted sigmoid and descending colon diverticulosis (physical records brought by patient and reviewed); on prior colonoscopy she had multiple rectal polyps. She has a history of diverticulitis >10 years ago. She was previously followed by GI in [place] and placed on Bentyl, which she intermittently takes and somewhat improves her pain. Her  is present and provides additional history, including that she complains of pain several times a day and he is concerned.     Past Medical History:   Diagnosis Date    Cancer     breast, right '97    Cataract        Past Surgical History:   Procedure Laterality Date    APPENDECTOMY      BREAST SURGERY      EYE SURGERY      HYSTERECTOMY      LUNG SURGERY  1998    remove cyst on top of right lobe       Social History   Substance Use Topics    Smoking status: Never Smoker    Smokeless tobacco: Never Used    Alcohol use Yes   -No illicits    Family History:  -No family history of IBD    Review of Systems  General ROS: negative for - chills, fever or weight loss  Psychological ROS: negative for - hallucination, depression or suicidal ideation  Ophthalmic ROS: negative for - blurry vision, photophobia or eye pain  ENT ROS: negative for - epistaxis, sore throat or rhinorrhea  Respiratory ROS: no cough, shortness of breath, or wheezing  Cardiovascular ROS: no chest pain or dyspnea on exertion  Gastrointestinal ROS: + abdominal pain/cramping, + change in bowel habits, no dysphagia  Genito-Urinary ROS: no dysuria, trouble voiding, or hematuria  Musculoskeletal ROS: negative for - arthralgia, myalgia, weakness  Neurological ROS: no syncope or seizures;  "no ataxia  Dermatological ROS: negative for pruritis, rash and jaundice    Physical Examination  /68   Pulse 73   Temp 98.7 °F (37.1 °C)   Resp 16   Ht 5' 4" (1.626 m)   Wt 67.1 kg (147 lb 14.9 oz)   BMI 25.39 kg/m²   General appearance: alert, cooperative, no distress  HENT: Normocephalic, atraumatic, neck symmetrical, no nasal discharge   Eyes: conjunctivae/corneas clear, PERRL, EOM's intact, sclera anicteric  Lungs: clear to auscultation bilaterally, no dullness to percussion bilaterally, symmetric expansion, breathing unlabored  Heart: regular rate and rhythm without rub; no displacement of the PMI   Abdomen: soft, mild ttp in RLQ without rebound or guarding, no masses, BS normoactive, no organomegaly appreciated  Extremities: extremities symmetric; no clubbing, cyanosis, or edema  Integument: Skin color, texture, turgor normal; no rashes; hair distrubution normal, no jaundice  Neurologic: Alert and oriented X 3, no focal sensory or motor neurologic deficits  Psychiatric: no pressured speech; normal affect; no evidence of impaired cognition, no anxiety/depression     Labs:  Lab Results   Component Value Date    WBC 8.5 10/06/2017    HGB 14.2 10/06/2017    HCT 42.1 10/06/2017    MCV 94.0 10/06/2017     10/06/2017     -Normal LFTs    Imaging:  CXR report independently visualized and reviewed by me and showed no acute findings    Assessment:   72 y.o. female with left-sided diverticulosis presents with several weeks of lower abdominal pain and change in bowel habits.     Plan:  -CT abdomen/pelvis   -Colonoscopy (pending results of CT ie no acute diverticulitis)  -Bentyl prn  -Miralax daily    Cristel Fritz MD  [facility]  [address], [address]  [place] [address]  Office: [phone]  Fax: [phone]  "

## 2018-01-23 ENCOUNTER — HOSPITAL ENCOUNTER (OUTPATIENT)
Dept: RADIOLOGY | Facility: HOSPITAL | Age: 73
Discharge: HOME | End: 2018-01-23
Attending: INTERNAL MEDICINE
Payer: MEDICARE

## 2018-01-23 DIAGNOSIS — R10.31 RLQ ABDOMINAL PAIN: ICD-10-CM

## 2018-01-23 PROCEDURE — 25500020 PHARM REV CODE 255

## 2018-01-23 PROCEDURE — 74177 CT ABD & PELVIS W/CONTRAST: CPT | Mod: TC

## 2018-01-23 PROCEDURE — 74177 CT ABD & PELVIS W/CONTRAST: CPT | Mod: 26,,, | Performed by: RADIOLOGY

## 2018-01-23 RX ORDER — SODIUM CHLORIDE 9 MG/ML
INJECTION, SOLUTION INTRAVENOUS
Status: DISPENSED
Start: 2018-01-23 | End: 2018-01-23

## 2018-01-23 RX ADMIN — IOHEXOL 75 ML: 350 INJECTION, SOLUTION INTRAVENOUS at 09:01

## 2018-01-23 RX ADMIN — IOHEXOL 30 ML: 350 INJECTION, SOLUTION INTRAVENOUS at 09:01

## 2018-01-26 ENCOUNTER — TELEPHONE (OUTPATIENT)
Dept: GASTROENTEROLOGY | Facility: CLINIC | Age: 73
End: 2018-01-26

## 2018-01-26 NOTE — TELEPHONE ENCOUNTER
----- Message from Jim Khalil sent at 1/26/2018  2:03 PM CST -----  Contact: Pt  X_ 1st Request  _ 2nd Request  _ 3rd Request    Who: JOEY SEVILLA [[id number]]    Why: Patient would like to speak with staff in regards to test results    What Number to Call Back: [phone]    When to Expect a call back: (Before the end of the day)  -- if call after 3:00 call back will be tomorrow.

## 2018-02-02 ENCOUNTER — OFFICE VISIT (OUTPATIENT)
Dept: PRIMARY CARE CLINIC | Facility: CLINIC | Age: 73
End: 2018-02-02
Payer: MEDICARE

## 2018-02-02 ENCOUNTER — TELEPHONE (OUTPATIENT)
Dept: FAMILY MEDICINE | Facility: CLINIC | Age: 73
End: 2018-02-02

## 2018-02-02 VITALS
WEIGHT: 145.94 LBS | TEMPERATURE: 99 F | HEIGHT: 64 IN | HEART RATE: 72 BPM | DIASTOLIC BLOOD PRESSURE: 88 MMHG | OXYGEN SATURATION: 96 % | SYSTOLIC BLOOD PRESSURE: 144 MMHG | BODY MASS INDEX: 24.92 KG/M2

## 2018-02-02 DIAGNOSIS — R68.89 FLU-LIKE SYMPTOMS: Primary | ICD-10-CM

## 2018-02-02 DIAGNOSIS — R05.9 COUGH: ICD-10-CM

## 2018-02-02 DIAGNOSIS — R09.81 NASAL CONGESTION: ICD-10-CM

## 2018-02-02 LAB
FLUAV AG SPEC QL IA: NEGATIVE
FLUBV AG SPEC QL IA: NEGATIVE
SPECIMEN SOURCE: NORMAL

## 2018-02-02 PROCEDURE — 99213 OFFICE O/P EST LOW 20 MIN: CPT | Mod: PBBFAC,PO,25 | Performed by: NURSE PRACTITIONER

## 2018-02-02 PROCEDURE — 1159F MED LIST DOCD IN RCRD: CPT | Mod: ,,, | Performed by: NURSE PRACTITIONER

## 2018-02-02 PROCEDURE — 99214 OFFICE O/P EST MOD 30 MIN: CPT | Mod: S$PBB,,, | Performed by: NURSE PRACTITIONER

## 2018-02-02 PROCEDURE — 99999 PR PBB SHADOW E&M-EST. PATIENT-LVL III: CPT | Mod: PBBFAC,,, | Performed by: NURSE PRACTITIONER

## 2018-02-02 PROCEDURE — 96372 THER/PROPH/DIAG INJ SC/IM: CPT | Mod: PBBFAC,PO

## 2018-02-02 PROCEDURE — 1126F AMNT PAIN NOTED NONE PRSNT: CPT | Mod: ,,, | Performed by: NURSE PRACTITIONER

## 2018-02-02 PROCEDURE — 87400 INFLUENZA A/B EACH AG IA: CPT | Mod: 59,PO

## 2018-02-02 RX ORDER — BENZONATATE 100 MG/1
100 CAPSULE ORAL 3 TIMES DAILY PRN
Qty: 20 CAPSULE | Refills: 0 | Status: SHIPPED | OUTPATIENT
Start: 2018-02-02 | End: 2018-02-07

## 2018-02-02 RX ORDER — BETAMETHASONE SODIUM PHOSPHATE AND BETAMETHASONE ACETATE 3; 3 MG/ML; MG/ML
6 INJECTION, SUSPENSION INTRA-ARTICULAR; INTRALESIONAL; INTRAMUSCULAR; SOFT TISSUE
Status: COMPLETED | OUTPATIENT
Start: 2018-02-02 | End: 2018-02-02

## 2018-02-02 RX ORDER — AZITHROMYCIN 250 MG/1
TABLET, FILM COATED ORAL
Qty: 6 TABLET | Refills: 0 | Status: SHIPPED | OUTPATIENT
Start: 2018-02-02 | End: 2018-02-07

## 2018-02-02 RX ADMIN — BETAMETHASONE ACETATE AND BETAMETHASONE SODIUM PHOSPHATE 6 MG: 3; 3 INJECTION, SUSPENSION INTRA-ARTICULAR; INTRALESIONAL; INTRAMUSCULAR; SOFT TISSUE at 06:02

## 2018-02-02 NOTE — TELEPHONE ENCOUNTER
----- Message from Clotilde Olsen sent at 2/2/2018  1:29 PM CST -----  Contact: [phone]  Patient requesting to be seen today for sore throat, fever, cough.Thanks!

## 2018-02-02 NOTE — PROGRESS NOTES
Subjective:       Patient ID: Liliana Hairston is a 72 y.o. female.    Chief Complaint: Cough (onset with sore throat on Monday (hurts when she coughs); Nasal Congestion; Generalized Body Aches (mild); and Fever (100F last pm)    Patient who is new to me presents with c/o cough and body aches.       Cough   This is a new problem. Episode onset: started Monday. The problem has been gradually worsening. The cough is productive of sputum. Associated symptoms include chills, a fever, headaches, postnasal drip, rhinorrhea and a sore throat. Pertinent negatives include no chest pain, myalgias, rash, shortness of breath or wheezing. The symptoms are aggravated by lying down. Treatments tried: mucinex, astelin. There is no history of asthma, bronchitis, COPD or pneumonia.   Fever    This is a new problem. The current episode started in the past 7 days. The maximum temperature noted was 100 to 100.9 F. The temperature was taken using an oral thermometer. Associated symptoms include coughing, headaches, muscle aches and a sore throat. Pertinent negatives include no abdominal pain, chest pain, congestion, diarrhea, nausea, rash, urinary pain, vomiting or wheezing. Treatments tried: mucinex and tylenol.     Review of Systems   Constitutional: Positive for chills and fever. Negative for fatigue.   HENT: Positive for postnasal drip, rhinorrhea and sore throat. Negative for congestion, sinus pain, sinus pressure and sneezing.    Respiratory: Positive for cough. Negative for chest tightness, shortness of breath and wheezing.    Cardiovascular: Negative for chest pain, palpitations and leg swelling.   Gastrointestinal: Negative for abdominal distention, abdominal pain, constipation, diarrhea, nausea and vomiting.   Genitourinary: Negative for decreased urine volume, difficulty urinating, dysuria, frequency and urgency.   Musculoskeletal: Negative for arthralgias, gait problem, joint swelling and myalgias.   Skin: Negative for rash  and wound.   Neurological: Positive for headaches. Negative for dizziness, light-headedness and numbness.       Objective:      Physical Exam   Constitutional: She is oriented to person, place, and time. She appears well-developed and well-nourished.   HENT:   Head: Normocephalic and atraumatic.   Right Ear: Hearing, tympanic membrane, external ear and ear canal normal.   Left Ear: Hearing, tympanic membrane, external ear and ear canal normal.   Nose: Nose normal. Right sinus exhibits no maxillary sinus tenderness and no frontal sinus tenderness. Left sinus exhibits no maxillary sinus tenderness and no frontal sinus tenderness.   Mouth/Throat: Posterior oropharyngeal erythema present.   Eyes: Pupils are equal, round, and reactive to light.   Neck: Normal range of motion.   Cardiovascular: Normal rate, regular rhythm, normal heart sounds and intact distal pulses.    Pulmonary/Chest: Effort normal and breath sounds normal.   Abdominal: Soft. Bowel sounds are normal.   Musculoskeletal: Normal range of motion.   Lymphadenopathy:        Head (right side): No submental, no submandibular, no tonsillar, no preauricular, no posterior auricular and no occipital adenopathy present.        Head (left side): No submental, no submandibular, no tonsillar, no preauricular, no posterior auricular and no occipital adenopathy present.   Neurological: She is alert and oriented to person, place, and time.   Skin: Skin is warm and dry.   Nursing note and vitals reviewed.      Assessment:       1. Flu-like symptoms    2. Cough    3. Nasal congestion        Plan:       Flu-like symptoms  -     Influenza antigen Nasopharyngeal Swab  -     betamethasone acetate-betamethasone sodium phosphate injection 6 mg; Inject 1 mL (6 mg total) into the muscle one time.    Cough  -     betamethasone acetate-betamethasone sodium phosphate injection 6 mg; Inject 1 mL (6 mg total) into the muscle one time.  -     benzonatate (TESSALON) 100 MG capsule; Take  1 capsule (100 mg total) by mouth 3 (three) times daily as needed.  Dispense: 20 capsule; Refill: 0    Nasal congestion  -     betamethasone acetate-betamethasone sodium phosphate injection 6 mg; Inject 1 mL (6 mg total) into the muscle one time.    Other orders  -     azithromycin (Z-GEOFF) 250 MG tablet; Take 2 tablets by mouth on day 1; Take 1 tablet by mouth on days 2-5  Dispense: 6 tablet; Refill: 0    The patient requests a steroid injection.  We reviewed the risks (suppression of the immune system at a time when the body is fighting the infection, lipodystrophy) and benefits (reducing the coughing and/or nasal congestion).  The patient would like to have the injection.    Discussed with patient that this is most likely a viral illness. Discussed OTCs. Patient to increase fluids and rest. Written prescription given only if symptoms do not improve by Monday/Tuesday. Patient to follow up with any new or concerning symptoms.

## 2018-02-02 NOTE — TELEPHONE ENCOUNTER
Please see message from patient. No available appointments in office on today's date. Patient has existing appointment on today's date with Dr. Mitchell. Patient notified. Verbalized understanding.

## 2018-02-03 NOTE — PATIENT INSTRUCTIONS
Adult Self-Care for Colds  Colds are caused by viruses. They can't be cured with antibiotics. However, you can ease symptoms and support your body's efforts to heal itself.  No matter which symptoms you have, be sure to:  · Drink plenty of fluids (water or clear soup)  · Stop smoking and drinking alcohol  · Get plenty of rest    Understand a fever  · Take your temperature several times a day. If your fever is 100.4°F (38.0°C) for more than a day, call your healthcare provider.  · Relax, lie down. Go to bed if you want. Just get off your feet and rest. Also, drink plenty of fluids to avoid dehydration.  · Take acetaminophen or a nonsteroidal anti-inflammatory agent (NSAID), such as ibuprofen.  Treat a troubled nose kindly  · Breathe steam or heated humidified air to open blocked nasal passages.  a hot shower or use a vaporizer. Be careful not to get burned by the steam.  · Saline nasal sprays and decongestant tablets help open a stuffy nose. Antihistamines can also help, but they can cause side effects such as drowsiness and drying of the eyes, nose, and mouth.  Soothe a sore throat and cough  · Gargle every 2 hours with 1/4 teaspoon of salt dissolved in 1/2 cup of warm water. Suck on throat lozenges and cough drops to moisten your throat.  · Cough medicines are available but it is unclear how well they actually work.  · Take acetaminophen or an NSAID, such as ibuprofen, to ease throat pain  Ease digestive problems  · Put fluids back into your body. Take frequent sips of clear liquids such as water or broth. Avoid drinks that have a lot of sugar in them, such as juices and sodas. These can make diarrhea worse. Older children and adults can drink sports drinks.  · As your appetite returns, you can resume your normal diet. Ask your healthcare provider if there are any foods you should avoid.  When to seek medical care  When you first notice symptoms, ask your healthcare provider if antiviral medicines are  appropriate. Antibiotics should not be taken for colds or flu. Also, call your healthcare provider if you have any of the following symptoms or if you aren't feeling better after 7 days:  · Shortness of breath  · Pain or pressure in the chest or belly (abdomen)  · Worsening symptoms, especially after a period of improvement  · Fever of 100.4°F  (38.0°C) or higher, or fever that doesn't go down with medicine  · Sudden dizziness or confusion  · Severe or continued vomiting  · Signs of dehydration, including extreme thirst, dark urine, infrequent urination, dry mouth  · Spotted, red, or very sore throat   Date Last Reviewed: 12/1/2016  © 6831-3040 [employer]. [address] [place] [address], [place] [address]. All rights reserved. This information is not intended as a substitute for professional medical care. Always follow your healthcare professional's instructions.      If you are not better in 4 days, if worse or you have concerns or questions, please do not hesitate to call.  You can reach us at [phone] Monday through Friday (except holidays) 12 nooon to 8 p.m.    Thank you for using the [facility]!    RUBI Staton, [facility]-BC  [facility]  [facility]

## 2018-02-07 ENCOUNTER — OFFICE VISIT (OUTPATIENT)
Dept: FAMILY MEDICINE | Facility: CLINIC | Age: 73
End: 2018-02-07
Payer: MEDICARE

## 2018-02-07 VITALS
BODY MASS INDEX: 25.36 KG/M2 | HEIGHT: 64 IN | OXYGEN SATURATION: 98 % | WEIGHT: 148.56 LBS | SYSTOLIC BLOOD PRESSURE: 110 MMHG | RESPIRATION RATE: 14 BRPM | HEART RATE: 75 BPM | TEMPERATURE: 98 F | DIASTOLIC BLOOD PRESSURE: 70 MMHG

## 2018-02-07 DIAGNOSIS — R05.9 COUGH: Primary | ICD-10-CM

## 2018-02-07 PROCEDURE — 99213 OFFICE O/P EST LOW 20 MIN: CPT | Mod: S$PBB,,, | Performed by: PHYSICIAN ASSISTANT

## 2018-02-07 PROCEDURE — 99999 PR PBB SHADOW E&M-EST. PATIENT-LVL IV: CPT | Mod: PBBFAC,,, | Performed by: PHYSICIAN ASSISTANT

## 2018-02-07 PROCEDURE — 1126F AMNT PAIN NOTED NONE PRSNT: CPT | Mod: ,,, | Performed by: PHYSICIAN ASSISTANT

## 2018-02-07 PROCEDURE — 1159F MED LIST DOCD IN RCRD: CPT | Mod: ,,, | Performed by: PHYSICIAN ASSISTANT

## 2018-02-07 PROCEDURE — 99214 OFFICE O/P EST MOD 30 MIN: CPT | Mod: PBBFAC,PO | Performed by: PHYSICIAN ASSISTANT

## 2018-02-07 RX ORDER — PROMETHAZINE HYDROCHLORIDE AND DEXTROMETHORPHAN HYDROBROMIDE 6.25; 15 MG/5ML; MG/5ML
5 SYRUP ORAL NIGHTLY PRN
Qty: 118 ML | Refills: 0 | Status: SHIPPED | OUTPATIENT
Start: 2018-02-07 | End: 2018-02-17

## 2018-02-07 NOTE — PROGRESS NOTES
Subjective:       Patient ID: Liliana Hairston is a 72 y.o. female.    Chief Complaint: Cough    Cough   This is a new problem. The current episode started in the past 7 days. The problem has been unchanged. The cough is non-productive. Pertinent negatives include no chest pain, chills, fever, headaches, heartburn, nasal congestion, postnasal drip, rash, rhinorrhea, shortness of breath, weight loss or wheezing. Nothing aggravates the symptoms. Treatments tried: mucinex, tessalon perles. The treatment provided no relief. There is no history of asthma, bronchiectasis, bronchitis, COPD, environmental allergies or pneumonia.     Review of Systems   Constitutional: Negative for activity change, appetite change, chills, fever and weight loss.   HENT: Negative for postnasal drip, rhinorrhea and sinus pressure.    Eyes: Negative for visual disturbance.   Respiratory: Positive for cough. Negative for shortness of breath and wheezing.    Cardiovascular: Negative for chest pain.   Gastrointestinal: Negative for abdominal distention, abdominal pain and heartburn.   Skin: Negative for rash.   Allergic/Immunologic: Negative for environmental allergies.   Neurological: Negative for headaches.   Hematological: Negative for adenopathy.   Psychiatric/Behavioral: The patient is not nervous/anxious.        Objective:      Physical Exam   Constitutional: She is oriented to person, place, and time.   HENT:   Mouth/Throat: Oropharynx is clear and moist. No oropharyngeal exudate.   Eyes: Conjunctivae are normal. Pupils are equal, round, and reactive to light.   Cardiovascular: Normal rate and regular rhythm.    Pulmonary/Chest: Effort normal and breath sounds normal. She has no wheezes.   Musculoskeletal: She exhibits no edema.   Lymphadenopathy:     She has no cervical adenopathy.   Neurological: She is alert and oriented to person, place, and time.   Skin: No erythema.   Psychiatric: Her behavior is normal.       Assessment:       1.  Cough        Plan:       Liliana was seen today for cough.    Diagnoses and all orders for this visit:    Cough  -     promethazine-dextromethorphan (PROMETHAZINE-DM) 6.25-15 mg/5 mL Syrp; Take 5 mLs by mouth nightly as needed.  Patient advised that cough has potential to cause drowsiness.

## 2018-02-21 ENCOUNTER — SURGERY (OUTPATIENT)
Age: 73
End: 2018-02-21

## 2018-02-21 ENCOUNTER — ANESTHESIA (OUTPATIENT)
Dept: ENDOSCOPY | Facility: HOSPITAL | Age: 73
End: 2018-02-21
Payer: MEDICARE

## 2018-02-21 ENCOUNTER — ANESTHESIA EVENT (OUTPATIENT)
Dept: ENDOSCOPY | Facility: HOSPITAL | Age: 73
End: 2018-02-21
Payer: MEDICARE

## 2018-02-21 ENCOUNTER — HOSPITAL ENCOUNTER (OUTPATIENT)
Facility: HOSPITAL | Age: 73
Discharge: HOME | End: 2018-02-21
Attending: INTERNAL MEDICINE | Admitting: INTERNAL MEDICINE
Payer: MEDICARE

## 2018-02-21 VITALS
WEIGHT: 145 LBS | RESPIRATION RATE: 18 BRPM | TEMPERATURE: 97 F | HEIGHT: 64 IN | SYSTOLIC BLOOD PRESSURE: 168 MMHG | BODY MASS INDEX: 24.75 KG/M2 | OXYGEN SATURATION: 100 % | HEART RATE: 69 BPM | DIASTOLIC BLOOD PRESSURE: 78 MMHG

## 2018-02-21 DIAGNOSIS — K57.30 DIVERTICULOSIS OF LARGE INTESTINE WITHOUT HEMORRHAGE: Primary | ICD-10-CM

## 2018-02-21 DIAGNOSIS — R10.9 ABDOMINAL PAIN: ICD-10-CM

## 2018-02-21 PROCEDURE — D9220A PRA ANESTHESIA: Mod: CRNA,,, | Performed by: NURSE ANESTHETIST, CERTIFIED REGISTERED

## 2018-02-21 PROCEDURE — 25000003 PHARM REV CODE 250: Performed by: INTERNAL MEDICINE

## 2018-02-21 PROCEDURE — 37000008 HC ANESTHESIA 1ST 15 MINUTES: Performed by: INTERNAL MEDICINE

## 2018-02-21 PROCEDURE — 45378 DIAGNOSTIC COLONOSCOPY: CPT | Performed by: INTERNAL MEDICINE

## 2018-02-21 PROCEDURE — 63600175 PHARM REV CODE 636 W HCPCS: Performed by: NURSE ANESTHETIST, CERTIFIED REGISTERED

## 2018-02-21 PROCEDURE — 45378 DIAGNOSTIC COLONOSCOPY: CPT | Mod: ,,, | Performed by: INTERNAL MEDICINE

## 2018-02-21 PROCEDURE — 37000009 HC ANESTHESIA EA ADD 15 MINS: Performed by: INTERNAL MEDICINE

## 2018-02-21 PROCEDURE — D9220A PRA ANESTHESIA: Mod: ANES,,, | Performed by: ANESTHESIOLOGY

## 2018-02-21 RX ORDER — SODIUM CHLORIDE 9 MG/ML
INJECTION, SOLUTION INTRAVENOUS CONTINUOUS
Status: DISCONTINUED | OUTPATIENT
Start: 2018-02-21 | End: 2018-02-21 | Stop reason: HOSPADM

## 2018-02-21 RX ORDER — PROPOFOL 10 MG/ML
VIAL (ML) INTRAVENOUS
Status: DISCONTINUED | OUTPATIENT
Start: 2018-02-21 | End: 2018-02-21

## 2018-02-21 RX ADMIN — PROPOFOL 20 MG: 10 INJECTION, EMULSION INTRAVENOUS at 10:02

## 2018-02-21 RX ADMIN — PROPOFOL 80 MG: 10 INJECTION, EMULSION INTRAVENOUS at 10:02

## 2018-02-21 RX ADMIN — SODIUM CHLORIDE: 0.9 INJECTION, SOLUTION INTRAVENOUS at 10:02

## 2018-02-21 NOTE — OR NURSING
Have you had a colonoscopy LESS THAN 3 years ago?   * If YES, answer these questions*:     NO  1. Did patient have a prior colonic polyp in a previous surveillance/diagnostic colonoscopy and is 18 years or older on date of encounter?      NO  2. Documentation of < 3 year interval since the patients last colonoscopy due to medical reasons (eg., last colonoscopy incomplete, last colonoscopy had inadequate prep, piecemeal removal of adenomas, or last colonoscopy found > 10 adenomas) ?       LAST COLON 6 YEARS AGO

## 2018-02-21 NOTE — H&P
[facility]    CC: ABdominal pain, change in bowel habits    HPI 72 y.o. female presents for evaluation of change in bowel habits and abdominal pain    Past Medical History:   Diagnosis Date    Cancer     breast, right '97    Cataract          Review of Systems  General ROS: negative for - chills, fever or weight loss  Cardiovascular ROS: no chest pain or dyspnea on exertion  Gastrointestinal ROS: + abdominal pain, no vomiting, + constipation    Physical Examination  /77   Pulse 76   Temp 97.1 °F (36.2 °C) (Temporal)   Resp 20   Ht 5' 4" (1.626 m)   Wt 65.8 kg (145 lb)   SpO2 98%   Breastfeeding? No   BMI 24.89 kg/m²   General appearance: alert, cooperative, no distress  HENT: Normocephalic, atraumatic, neck symmetrical, no nasal discharge, sclera anicteric   Lungs: clear to auscultation bilaterally, symmetric chest wall expansion bilaterally  Heart: regular rate and rhythm without rub; no displacement of the PMI   Abdomen: soft, nontender,nondistedned  Extremities: extremities symmetric; no clubbing, cyanosis, or edema        Labs:  Lab Results   Component Value Date    WBC 8.5 10/06/2017    HGB 14.2 10/06/2017    HCT 42.1 10/06/2017    MCV 94.0 10/06/2017     10/06/2017         Imaging:  CT abdomen/pelvis  was independently visualized and reviewed by me and showed severe sigmoid diverticulosis    Assessment:   72 y.o. female presents for evaluation of abdominal pain and change in bowel habits    Plan:  -Proceed to colonoscopy    Cristel Fritz MD  [facility]  [address], [address]  [place] [address]  Office: [phone]  Fax: [phone]  "

## 2018-02-21 NOTE — TRANSFER OF CARE
"Anesthesia Transfer of Care Note    Patient: Liliana Hairston    Procedure(s) Performed: Procedure(s) (LRB):  COLONOSCOPY (N/A)    Patient location: PACU    Anesthesia Type: general    Transport from OR: Transported from OR on room air with adequate spontaneous ventilation    Post pain: adequate analgesia    Post assessment: no apparent anesthetic complications    Post vital signs: stable    Level of consciousness: awake    Nausea/Vomiting: no nausea/vomiting    Complications: none    Transfer of care protocol was followed      Last vitals:   Visit Vitals  /77   Pulse 76   Temp 36.2 °C (97.1 °F) (Temporal)   Resp 20   Ht 5' 4" (1.626 m)   Wt 65.8 kg (145 lb)   SpO2 98%   Breastfeeding? No   BMI 24.89 kg/m²     "

## 2018-02-21 NOTE — DISCHARGE INSTRUCTIONS
Tips to Control Acid Reflux    To control acid reflux, youll need to make some basic diet and lifestyle changes. The simple steps outlined below may be all youll need to ease discomfort.  Watch what you eat  · Avoid fatty foods and spicy foods.  · Eat fewer acidic foods, such as citrus and tomato-based foods. These can increase symptoms.  · Limit drinking alcohol, caffeine, and fizzy beverages. All increase acid reflux.  · Try limiting chocolate, peppermint, and spearmint. These can worsen acid reflux in some people.  Watch when you eat  · Avoid lying down for 3 hours after eating.  · Do not snack before going to bed.  Raise your head  Raising your head and upper body by 4 to 6 inches helps limit reflux when youre lying down. Put blocks under the head of your bed frame to raise it.  Other changes  · Lose weight, if you need to  · Dont exercise near bedtime  · Avoid tight-fitting clothes  · Limit aspirin and ibuprofen  · Stop smoking   Date Last Reviewed: 7/1/2016  © 6838-5196 [employer]. [address] [place] [address]. All rights reserved. This information is not intended as a substitute for professional medical care. Always follow your healthcare professional's instructions.        Eating a High-Fiber Diet  Fiber is what gives strength and structure to plants. Most grains, beans, vegetables, and fruits contain fiber. Foods rich in fiber are often low in calories and fat, and they fill you up more. They may also reduce your risks for certain health problems. To find out the amount of fiber in canned, packaged, or frozen foods, read the Nutrition Facts label. It tells you how much fiber is in a serving.    Types of fiber and their benefits  There are two types of fiber: insoluble and soluble. They both aid digestion and help you maintain a healthy weight.  · Insoluble fiber. This is found in whole grains, cereals, certain fruits and vegetables such as apple skin, corn, and carrots.  Insoluble fiber may prevent constipation and reduce the risk for certain types of cancer.  · Soluble fiber. This type of fiber is in oats, beans, and certain fruits and vegetables such as strawberries and peas. Soluble fiber can reduce cholesterol, which may help lower the risk for heart disease. It also helps control blood sugar levels.  Look for high-fiber foods  Try these foods to add fiber to your diet:  · Whole-grain breads and cereals. Try to eat 6 to 8 ounces a day. Include wheat and oat bran cereals, whole-wheat muffins or toast, and corn tortillas in your meals.  · Fruits. Try to eat 2 cups a day. Apples, oranges, strawberries, pears, and bananas are good sources. (Note: Fruit juice is low in fiber.)  · Vegetables. Try to eat at least 2.5 cups a day. Add asparagus, carrots, broccoli, peas, and corn to your meals.  · Beans. One cup of cooked lentils gives you over 15 grams of fiber. Try navy beans, lentils, and chickpeas.  · Seeds. A small handful of seeds gives you about 3 grams of fiber. Try sunflower seeds.  Keep track of your fiber  Keep track of how much fiber you eat. Start by reading food labels. Then eat a variety of foods high in fiber. As you begin to eat more fiber, ask your healthcare provider how much water you should be drinking to keep your digestive system working smoothly.  You should aim for a certain amount of fiber in your diet each day. If you are a woman, that amount is between 25 and 28 grams per day. Men should aim for 30 to 33 grams per day. After age 50, your daily fiber needs drop to 22 grams for women and 28 grams for men.  Before you reach for the fiber supplements, think about this. Fiber is found naturally in healthy whole foods. It gives you that feeling of fullness after you eat. Taking fiber supplements or eating fiber-enriched foods will not give you this full feeling.  Your fiber intake is a good measure for the quality of your overall diet. If you are missing out on your  daily amount of fiber, you may be lacking other important nutrients as well.  Date Last Reviewed: 5/11/2015  © 6801-4756 [employer]. [address] [place] [address], [place] [address]. All rights reserved. This information is not intended as a substitute for professional medical care. Always follow your healthcare professional's instructions.        Diverticulosis    Diverticulosis means that small pouches have formed in the wall of your large intestine (colon). Most often, this problem causes no symptoms and is common as people age. But the pouches in the colon are at risk of becoming infected. When this happens, the condition is called diverticulitis. Although most people with diverticulosis never develop diverticulitis, it is still not uncommon. Rectal bleeding can also occur and in less common situations, a type of colon inflammation called colitis.  While most people do not have symptoms, some people with diverticulosis may have:  · Abdominal cramps and pain  · Bloating  · Constipation  · Change in bowel habits  Causes  The exact cause of diverticulosis (and diverticulitis) has not been proved, but a few things are associated with the condition:  · Low-fiber diet  · Constipation  · Lack of exercise  Your healthcare provider will talk with you about how to manage your condition. Diet changes may be all that are needed to help control diverticulosis and prevent progression to diverticulitis. If you develop diverticulitis, you will likely need other treatments.  Home care  You may be told to take fiber supplements daily. Fiber adds bulk to the stool so that it passes through the colon more easily. Stool softeners may be recommended. You may also be given medications for pain relief. Be sure to take all medications as directed.  In the past, people were told to avoid corn, nuts, and seeds. This is no longer necessary.  Follow these guidelines when caring for yourself at home:  · Eat unprocessed foods that are  high in fiber. Whole grains, fruits, and vegetables are good choices.  · Drink 6 to 8 glasses of water every day unless your healthcare provider has you limit how much fluid you should have.  · Watch for changes in your bowel movements. Tell your provider if you notice any changes.  · Begin an exercise program. Ask your provider how to get started. Generally, walking is the best.  · Get plenty of rest and sleep.  Follow-up care  Follow up with your healthcare provider, or as advised. Regular visits may be needed to check on your health. Sometimes special procedures such as colonoscopy, are needed after an episode of diverticulitis or blooding. Be sure to keep all your appointments.  If a stool sample was taken, or cultures were done, you should be told if they are positive, or if your treatment needs to be changed. You can call as directed for the results.  If X-rays were done, a radiologist will look at them. You will be told if there is a change in your treatment.  If antibiotics were prescribed, be sure to finish them all.  When to seek medical advice  Call your healthcare provider right away if any of these occur:  · Fever of 100.4°F (38°C) or higher, or as directed by your healthcare provider  · Severe cramps in the lower left side of the abdomen or pain that is getting worse  · Tenderness in the lower left side of the abdomen or worsening pain throughout the abdomen  · Diarrhea or constipation that doesn't get better within 24 hours  · Nausea and vomiting  · Bleeding from the rectum  Call 911  Call emergency services if any of the following occur:  · Trouble breathing  · Confusion  · Very drowsy or trouble awakening  · Fainting or loss of consciousness  · Rapid heart rate  · Chest pain  Date Last Reviewed: 12/30/2015  © 4770-1663 [email or web address]. [address] [place] [address], [place] [address]. All rights reserved. This information is not intended as a substitute for professional medical care. Always  "follow your healthcare professional's instructions.      Discharge Instructions: After Your Surgery/Procedure  Youve just had surgery. During surgery you were given medicine called anesthesia to keep you relaxed and free of pain. After surgery you may have some pain or nausea. This is common. Here are some tips for feeling better and getting well after surgery.     Stay on schedule with your medication.   Going home  Your doctor or nurse will show you how to take care of yourself when you go home. He or she will also answer your questions. Have an adult family member or friend drive you home.      For your safety we recommend these precaution for the first 24 hours after your procedure:  · Do not drive or use heavy equipment.  · Do not make important decisions or sign legal papers.  · Do not drink alcohol.  · Have someone stay with you, if needed. He or she can watch for problems and help keep you safe.  · Your concentration, balance, coordination, and judgement may be impaired for many hours after anesthesia.  Use caution when ambulating or standing up.     · You may feel weak and "washed out" after anesthesia and surgery.      Subtle residual effects of general anesthesia or sedation with regional / local anesthesia can last more than 24 hours.  Rest for the remainder of the day or longer if your Doctor/Surgeon has advised you to do so.  Although you may feel normal within the first 24 hours, your reflexes and mental ability may be impaired without you realizing it.  You may feel dizzy, lightheaded or sleepy for 24 hours or longer.      Be sure to go to all follow-up visits with your doctor. And rest after your surgery for as long as your doctor tells you to.  Coping with pain  If you have pain after surgery, pain medicine will help you feel better. Take it as told, before pain becomes severe. Also, ask your doctor or pharmacist about other ways to control pain. This might be with heat, ice, or relaxation. And " follow any other instructions your surgeon or nurse gives you.  Tips for taking pain medicine  To get the best relief possible, remember these points:  · Pain medicines can upset your stomach. Taking them with a little food may help.  · Most pain relievers taken by mouth need at least 20 to 30 minutes to start to work.  · Taking medicine on a schedule can help you remember to take it. Try to time your medicine so that you can take it before starting an activity. This might be before you get dressed, go for a walk, or sit down for dinner.  · Constipation is a common side effect of pain medicines. Call your doctor before taking any medicines such as laxatives or stool softeners to help ease constipation. Also ask if you should skip any foods. Drinking lots of fluids and eating foods such as fruits and vegetables that are high in fiber can also help. Remember, do not take laxatives unless your surgeon has prescribed them.  · Drinking alcohol and taking pain medicine can cause dizziness and slow your breathing. It can even be deadly. Do not drink alcohol while taking pain medicine.  · Pain medicine can make you react more slowly to things. Do not drive or run machinery while taking pain medicine.  Your health care provider may tell you to take acetaminophen to help ease your pain. Ask him or her how much you are supposed to take each day. Acetaminophen or other pain relievers may interact with your prescription medicines or other over-the-counter (OTC) drugs. Some prescription medicines have acetaminophen and other ingredients. Using both prescription and OTC acetaminophen for pain can cause you to overdose. Read the labels on your OTC medicines with care. This will help you to clearly know the list of ingredients, how much to take, and any warnings. It may also help you not take too much acetaminophen. If you have questions or do not understand the information, ask your pharmacist or health care provider to explain it  to you before you take the OTC medicine.  Managing nausea  Some people have an upset stomach after surgery. This is often because of anesthesia, pain, or pain medicine, or the stress of surgery. These tips will help you handle nausea and eat healthy foods as you get better. If you were on a special food plan before surgery, ask your doctor if you should follow it while you get better. These tips may help:  · Do not push yourself to eat. Your body will tell you when to eat and how much.  · Start off with clear liquids and soup. They are easier to digest.  · Next try semi-solid foods, such as mashed potatoes, applesauce, and gelatin, as you feel ready.  · Slowly move to solid foods. Dont eat fatty, rich, or spicy foods at first.  · Do not force yourself to have 3 large meals a day. Instead eat smaller amounts more often.  · Take pain medicines with a small amount of solid food, such as crackers or toast, to avoid nausea.     Call your surgeon if  · You still have pain an hour after taking medicine. The medicine may not be strong enough.  · You feel too sleepy, dizzy, or groggy. The medicine may be too strong.  · You have side effects like nausea, vomiting, or skin changes, such as rash, itching, or hives.       If you have obstructive sleep apnea  You were given anesthesia medicine during surgery to keep you comfortable and free of pain. After surgery, you may have more apnea spells because of this medicine and other medicines you were given. The spells may last longer than usual.   At home:  · Keep using the continuous positive airway pressure (CPAP) device when you sleep. Unless your health care provider tells you not to, use it when you sleep, day or night. CPAP is a common device used to treat obstructive sleep apnea.  · Talk with your provider before taking any pain medicine, muscle relaxants, or sedatives. Your provider will tell you about the possible dangers of taking these medicines.  © 2058-2903 [employer]. [address] [place] [address], [place] [address]. All rights reserved. This information is not intended as a substitute for professional medical care. Always follow your healthcare professional's instructions.

## 2018-02-21 NOTE — PLAN OF CARE
Patient awake, alert, and oriented.  No complaints of pain or discomfort at present time.patient tolerated po fluids;    Abdomen soft and nontender;  Ambulates without difficulty;  All instructions given and reviewed with patient and family;  Stable for discharge to home accompanied by

## 2018-02-21 NOTE — ANESTHESIA PREPROCEDURE EVALUATION
02/21/2018  Liliana Hairston is a 72 y.o., female.    Anesthesia Evaluation    I have reviewed the Patient Summary Reports.    I have reviewed the Nursing Notes.      Review of Systems  Anesthesia Hx:  No problems with previous Anesthesia        Physical Exam  General:  Well nourished                 Anesthesia Plan  Type of Anesthesia, risks & benefits discussed:  Anesthesia Type:  general  Patient's Preference:   Intra-op Monitoring Plan:   Intra-op Monitoring Plan Comments:   Post Op Pain Control Plan:   Post Op Pain Control Plan Comments:   Induction:   IV  Beta Blocker:  Patient is not currently on a Beta-Blocker (No further documentation required).       Informed Consent: Patient understands risks and agrees with Anesthesia plan.  Questions answered. Anesthesia consent signed with patient.  ASA Score: 2     Day of Surgery Review of History & Physical:    H&P update referred to the surgeon.         Ready For Surgery From Anesthesia Perspective.

## 2018-03-16 ENCOUNTER — OFFICE VISIT (OUTPATIENT)
Dept: ORTHOPEDICS | Facility: CLINIC | Age: 73
End: 2018-03-16
Payer: MEDICARE

## 2018-03-16 VITALS
WEIGHT: 145 LBS | HEIGHT: 64 IN | HEART RATE: 66 BPM | DIASTOLIC BLOOD PRESSURE: 78 MMHG | SYSTOLIC BLOOD PRESSURE: 110 MMHG | BODY MASS INDEX: 24.75 KG/M2

## 2018-03-16 DIAGNOSIS — M17.11 PRIMARY OSTEOARTHRITIS OF RIGHT KNEE: Primary | ICD-10-CM

## 2018-03-16 PROCEDURE — 99213 OFFICE O/P EST LOW 20 MIN: CPT | Mod: 25,,, | Performed by: ORTHOPAEDIC SURGERY

## 2018-03-16 PROCEDURE — 20610 DRAIN/INJ JOINT/BURSA W/O US: CPT | Mod: RT,,, | Performed by: ORTHOPAEDIC SURGERY

## 2018-03-16 NOTE — PROGRESS NOTES
Past Medical History:   Diagnosis Date    Cancer     breast, right '97    Cataract        Past Surgical History:   Procedure Laterality Date    APPENDECTOMY      BREAST SURGERY      right mastectomy    COLONOSCOPY N/A 2/21/2018    Procedure: COLONOSCOPY;  Surgeon: Cristel Fritz MD;  Location: [facility];  Service: Endoscopy;  Laterality: N/A;    EYE SURGERY      HYSTERECTOMY      LUNG SURGERY  1998    remove cyst on top of right lobe       Current Outpatient Prescriptions   Medication Sig    anastrozole (ARIMIDEX) 1 mg Tab Take 1 mg by mouth once daily.    aspirin (ECOTRIN) 81 MG EC tablet Take 81 mg by mouth once daily.    azelastine (ASTELIN) 137 mcg (0.1 %) nasal spray 1 spray (137 mcg total) by Nasal route 2 (two) times daily.    CALCIUM CARBONATE/VITAMIN D3 (CALCIUM 500 + D ORAL) Take by mouth.    cyanocobalamin (VITAMIN B-12) 1000 MCG tablet Take 1,000 mcg by mouth once daily.     dicyclomine (BENTYL) 10 MG capsule Take 10 mg by mouth 4 (four) times daily before meals and nightly.    glucosamine HCl 1,500 mg Tab Take by mouth.    LACTOBACILLUS ACIDOPHILUS (PROBIOTIC ORAL) Take by mouth.     No current facility-administered medications for this visit.        Review of patient's allergies indicates:  No Known Allergies    History reviewed. No pertinent family history.    Social History     Social History    Marital status:      Spouse name: N/A    Number of children: N/A    Years of education: N/A     Occupational History    Not on file.     Social History Main Topics    Smoking status: Never Smoker    Smokeless tobacco: Never Used    Alcohol use 2.4 oz/week     2 Glasses of wine, 2 Cans of beer per week      Comment: 2x per week wine or beer    Drug use: No    Sexual activity: Not on file     Other Topics Concern    Not on file     Social History Narrative    No narrative on file       Chief Complaint:   Chief Complaint   Patient presents with    Follow-up      Patient is  "still having right knee pain, burning, swelling and a pulling feeling is also behind the knee as well. The pain has never really went away since the surgery and she has a popping sound now in the right knee.  DOS: 10/10/2017, partial medial meniscectomy chondroplasty and synovectomy right knee.       Consulting Physician: No ref. provider found    History of Present Illness:    This is a 72 y.o. year old female who complains of patient follows up today on her right knee pain she is status post arthrpy to right knee patient was found to havoderate arthrf the medial compartment and patellofemoral compartment along with a medial meniscal tear      ROS    Examination:    Vital Signs:    Vitals:    03/16/18 1117   BP: 110/78   Pulse: 66   Weight: 65.8 kg (145 lb)   Height: 5' 4" (1.626 m)       This a well-developed, well nourished patient in no acute distress.    Alert and oriented and cooperative to examination.       Physical Exam: right knee-she has a moderate effusion present she has medial knee pain    Imaging: X-rays ordered and reviewed today no x-rays done today     Assessment: moderate osteoarthritis of the medial and patellofemoral compartment right knee    Plan:  Will aspirate and inject the knee with Supartz today      DISCLAIMER: This note may have been dictated using voice recognition software and may contain grammatical errors.     NOTE: Consult report sent to referring provider via EPIC EMR.  "

## 2018-03-16 NOTE — PROCEDURES
Large Joint Aspiration/Injection  Date/Time: 3/16/2018 11:36 AM  Performed by: MOISÉS GUZMAN  Authorized by: MOISÉS GUZMAN     Consent Done?:  Yes (Verbal)  Indications:  Pain and joint swelling  Procedure site marked: Yes    Timeout: Prior to procedure the correct patient, procedure, and site was verified      Location:  Knee  Site:  R knee  Prep: Patient was prepped and draped in usual sterile fashion    Needle size:  22 G  Approach:  Lateral  Medications:  25 mg SUPARTZ 10 mg/mL  Patient tolerance:  Patient tolerated the procedure well with no immediate complications

## 2018-03-23 ENCOUNTER — OFFICE VISIT (OUTPATIENT)
Dept: ORTHOPEDICS | Facility: CLINIC | Age: 73
End: 2018-03-23
Payer: MEDICARE

## 2018-03-23 VITALS — HEIGHT: 64 IN | WEIGHT: 145 LBS | BODY MASS INDEX: 24.75 KG/M2

## 2018-03-23 DIAGNOSIS — M17.11 PRIMARY OSTEOARTHRITIS OF RIGHT KNEE: Primary | ICD-10-CM

## 2018-03-23 PROCEDURE — 99499 UNLISTED E&M SERVICE: CPT | Mod: ,,, | Performed by: ORTHOPAEDIC SURGERY

## 2018-03-23 PROCEDURE — 20610 DRAIN/INJ JOINT/BURSA W/O US: CPT | Mod: RT,,, | Performed by: ORTHOPAEDIC SURGERY

## 2018-03-23 NOTE — PROGRESS NOTES
Past Medical History:   Diagnosis Date    Cancer     breast, right '97    Cataract        Past Surgical History:   Procedure Laterality Date    APPENDECTOMY      BREAST SURGERY      right mastectomy    COLONOSCOPY N/A 2/21/2018    Procedure: COLONOSCOPY;  Surgeon: Cristel Fritz MD;  Location: [facility];  Service: Endoscopy;  Laterality: N/A;    EYE SURGERY      HYSTERECTOMY      LUNG SURGERY  1998    remove cyst on top of right lobe       Current Outpatient Prescriptions   Medication Sig    anastrozole (ARIMIDEX) 1 mg Tab Take 1 mg by mouth once daily.    aspirin (ECOTRIN) 81 MG EC tablet Take 81 mg by mouth once daily.    azelastine (ASTELIN) 137 mcg (0.1 %) nasal spray 1 spray (137 mcg total) by Nasal route 2 (two) times daily.    CALCIUM CARBONATE/VITAMIN D3 (CALCIUM 500 + D ORAL) Take by mouth.    cyanocobalamin (VITAMIN B-12) 1000 MCG tablet Take 1,000 mcg by mouth once daily.     dicyclomine (BENTYL) 10 MG capsule Take 10 mg by mouth 4 (four) times daily before meals and nightly.    glucosamine HCl 1,500 mg Tab Take by mouth.    LACTOBACILLUS ACIDOPHILUS (PROBIOTIC ORAL) Take by mouth.     No current facility-administered medications for this visit.        Review of patient's allergies indicates:  No Known Allergies    History reviewed. No pertinent family history.    Social History     Social History    Marital status:      Spouse name: N/A    Number of children: N/A    Years of education: N/A     Occupational History    Not on file.     Social History Main Topics    Smoking status: Never Smoker    Smokeless tobacco: Never Used    Alcohol use 2.4 oz/week     2 Glasses of wine, 2 Cans of beer per week      Comment: 2x per week wine or beer    Drug use: No    Sexual activity: Not on file     Other Topics Concern    Not on file     Social History Narrative    No narrative on file       Chief Complaint:   Chief Complaint   Patient presents with    Injections     Patient  "here for #2 supartz Right knee       Consulting Physician: No ref. provider found    History of Present Illness:    This is a 72 y.o. year old female who complains of she returns today for a Supartz injection to the right knee      ROS    Examination:    Vital Signs:    Vitals:    03/23/18 1015   Weight: 65.8 kg (145 lb)   Height: 5' 4" (1.626 m)       This a well-developed, well nourished patient in no acute distress.    Alert and oriented and cooperative to examination.       Physical Exam: right knee-atient is a mild effusion no warmth or any cellulitis    Imaging: X-rays ordered and reviewed today no x-rays done today     Assessment: steoarthritis of the right knee    Plan:  We'll inject Supartz injection today and return in 1 week      DISCLAIMER: This note may have been dictated using voice recognition software and may contain grammatical errors.     NOTE: Consult report sent to referring provider via EPIC EMR.  "

## 2018-03-26 ENCOUNTER — OFFICE VISIT (OUTPATIENT)
Dept: ORTHOPEDICS | Facility: CLINIC | Age: 73
End: 2018-03-26
Payer: MEDICARE

## 2018-03-26 VITALS — WEIGHT: 145 LBS | BODY MASS INDEX: 24.75 KG/M2 | HEIGHT: 64 IN

## 2018-03-26 DIAGNOSIS — M17.11 PRIMARY OSTEOARTHRITIS OF RIGHT KNEE: Primary | ICD-10-CM

## 2018-03-26 PROCEDURE — 20610 DRAIN/INJ JOINT/BURSA W/O US: CPT | Mod: ,,, | Performed by: ORTHOPAEDIC SURGERY

## 2018-03-26 PROCEDURE — 99499 UNLISTED E&M SERVICE: CPT | Mod: ,,, | Performed by: ORTHOPAEDIC SURGERY

## 2018-03-26 NOTE — PROCEDURES
Large Joint Aspiration/Injection  Date/Time: 3/26/2018 1:35 PM  Performed by: MOISÉS GUZMAN  Authorized by: MOISÉS GUZMAN     Consent Done?:  Yes (Verbal)  Indications:  Pain and joint swelling  Procedure site marked: Yes    Timeout: Prior to procedure the correct patient, procedure, and site was verified      Location:  Knee  Site:  R knee  Prep: Patient was prepped and draped in usual sterile fashion    Needle size:  22 G  Approach:  Lateral  Medications:  25 mg SUPARTZ 10 mg/mL  Patient tolerance:  Patient tolerated the procedure well with no immediate complications

## 2018-04-02 PROCEDURE — 20610 DRAIN/INJ JOINT/BURSA W/O US: CPT | Mod: RT,,, | Performed by: ORTHOPAEDIC SURGERY

## 2018-04-02 NOTE — PROCEDURES
Large Joint Aspiration/Injection  Date/Time: 4/2/2018 4:28 PM  Performed by: MOISÉS GUZMAN  Authorized by: MOISÉS GUZMAN     Consent Done?:  Yes (Verbal)  Indications:  Pain and joint swelling  Procedure site marked: Yes    Timeout: Prior to procedure the correct patient, procedure, and site was verified      Location:  Knee  Site:  R knee  Prep: Patient was prepped and draped in usual sterile fashion    Needle size:  22 G  Approach:  Lateral  Medications:  25 mg SUPARTZ 10 mg/mL  Patient tolerance:  Patient tolerated the procedure well with no immediate complications

## 2018-04-10 DIAGNOSIS — R05.9 COUGH: ICD-10-CM

## 2018-04-11 RX ORDER — PROMETHAZINE HYDROCHLORIDE AND DEXTROMETHORPHAN HYDROBROMIDE 6.25; 15 MG/5ML; MG/5ML
5 SYRUP ORAL NIGHTLY PRN
Refills: 0 | OUTPATIENT
Start: 2018-04-11 | End: 2018-04-21

## 2018-04-13 DIAGNOSIS — R05.9 COUGH: ICD-10-CM

## 2018-04-13 RX ORDER — PROMETHAZINE HYDROCHLORIDE AND DEXTROMETHORPHAN HYDROBROMIDE 6.25; 15 MG/5ML; MG/5ML
5 SYRUP ORAL NIGHTLY PRN
Refills: 0 | OUTPATIENT
Start: 2018-04-13 | End: 2018-04-23

## 2018-05-07 ENCOUNTER — OFFICE VISIT (OUTPATIENT)
Dept: ORTHOPEDICS | Facility: CLINIC | Age: 73
End: 2018-05-07
Payer: MEDICARE

## 2018-05-07 VITALS
SYSTOLIC BLOOD PRESSURE: 110 MMHG | DIASTOLIC BLOOD PRESSURE: 60 MMHG | BODY MASS INDEX: 24.75 KG/M2 | WEIGHT: 145 LBS | HEIGHT: 64 IN | HEART RATE: 60 BPM

## 2018-05-07 DIAGNOSIS — M17.11 PRIMARY OSTEOARTHRITIS OF RIGHT KNEE: Primary | ICD-10-CM

## 2018-05-07 PROCEDURE — 99213 OFFICE O/P EST LOW 20 MIN: CPT | Mod: ,,, | Performed by: ORTHOPAEDIC SURGERY

## 2018-05-07 RX ORDER — MELOXICAM 15 MG/1
15 TABLET ORAL DAILY
Qty: 30 TABLET | Refills: 1 | Status: SHIPPED | OUTPATIENT
Start: 2018-05-07 | End: 2018-09-26 | Stop reason: SDUPTHER

## 2018-05-07 NOTE — PROGRESS NOTES
Past Medical History:   Diagnosis Date    Cancer     breast, right '97    Cataract        Past Surgical History:   Procedure Laterality Date    APPENDECTOMY      BREAST SURGERY      right mastectomy    COLONOSCOPY N/A 2/21/2018    Procedure: COLONOSCOPY;  Surgeon: Cristel Fritz MD;  Location: [facility];  Service: Endoscopy;  Laterality: N/A;    EYE SURGERY      HYSTERECTOMY      LUNG SURGERY  1998    remove cyst on top of right lobe       Current Outpatient Prescriptions   Medication Sig    anastrozole (ARIMIDEX) 1 mg Tab Take 1 mg by mouth once daily.    aspirin (ECOTRIN) 81 MG EC tablet Take 81 mg by mouth once daily.    azelastine (ASTELIN) 137 mcg (0.1 %) nasal spray 1 spray (137 mcg total) by Nasal route 2 (two) times daily.    CALCIUM CARBONATE/VITAMIN D3 (CALCIUM 500 + D ORAL) Take by mouth.    cyanocobalamin (VITAMIN B-12) 1000 MCG tablet Take 1,000 mcg by mouth once daily.     dicyclomine (BENTYL) 10 MG capsule Take 10 mg by mouth 4 (four) times daily before meals and nightly.    glucosamine HCl 1,500 mg Tab Take by mouth.    LACTOBACILLUS ACIDOPHILUS (PROBIOTIC ORAL) Take by mouth.     No current facility-administered medications for this visit.      Facility-Administered Medications Ordered in Other Visits   Medication    SUPARTZ injection Syrg 25 mg       Review of patient's allergies indicates:  No Known Allergies    History reviewed. No pertinent family history.    Social History     Social History    Marital status:      Spouse name: N/A    Number of children: N/A    Years of education: N/A     Occupational History    Not on file.     Social History Main Topics    Smoking status: Never Smoker    Smokeless tobacco: Never Used    Alcohol use 2.4 oz/week     2 Glasses of wine, 2 Cans of beer per week      Comment: 2x per week wine or beer    Drug use: No    Sexual activity: Not on file     Other Topics Concern    Not on file     Social History Narrative    No  "narrative on file       Chief Complaint:   Chief Complaint   Patient presents with    Follow-up     Right knee pain.  Here for office visit after supartz injections.  Patient states knee is much better.         Consulting Physician: No ref. provider found    History of Present Illness:    This is a 72 y.o. year old female who complains of atient is fowing up today after Supartz injecto her right knee she states her right knee is doing much better      ROS    Examination:    Vital Signs:    Vitals:    05/07/18 1010   BP: 110/60   Pulse: 60   Weight: 65.8 kg (145 lb)   Height: 5' 4" (1.626 m)   PainSc:   1       This a well-developed, well nourished patient in no acute distress.    Alert and oriented and cooperative to examination.       Physical Exam: -right knee-atient has a slight effusin she has good range of motion    Imaging: X-rays ordered and reviewed today previous x-rays of right knee show some mild degenerative changes patient also had a recent MRI of her right kneepatient also scopy to the right knee in October 2017 that showed moderate arthritis of the medial compartment or patellofemoral arthritis and a medial meniscal tear     Assessment: oderate arthritis to the right knee    Plan:  Patient showed improvement with the Supartz injection      DISCLAIMER: This note may have been dictated using voice recognition software and may contain grammatical errors.     NOTE: Consult report sent to referring provider via [employer] EMR.  "

## 2018-07-02 ENCOUNTER — OFFICE VISIT (OUTPATIENT)
Dept: ORTHOPEDICS | Facility: CLINIC | Age: 73
End: 2018-07-02
Payer: MEDICARE

## 2018-07-02 VITALS — BODY MASS INDEX: 24.75 KG/M2 | HEIGHT: 64 IN | WEIGHT: 145 LBS

## 2018-07-02 DIAGNOSIS — M17.11 PRIMARY OSTEOARTHRITIS OF RIGHT KNEE: Primary | ICD-10-CM

## 2018-07-02 PROCEDURE — 99213 OFFICE O/P EST LOW 20 MIN: CPT | Mod: ,,, | Performed by: ORTHOPAEDIC SURGERY

## 2018-07-02 NOTE — PROGRESS NOTES
Past Medical History:   Diagnosis Date    Cancer     breast, right '97    Cataract        Past Surgical History:   Procedure Laterality Date    APPENDECTOMY      BREAST SURGERY      right mastectomy    COLONOSCOPY N/A 2/21/2018    Procedure: COLONOSCOPY;  Surgeon: Cristel Fritz MD;  Location: [facility];  Service: Endoscopy;  Laterality: N/A;    EYE SURGERY      HYSTERECTOMY      LUNG SURGERY  1998    remove cyst on top of right lobe       Current Outpatient Prescriptions   Medication Sig    anastrozole (ARIMIDEX) 1 mg Tab Take 1 mg by mouth once daily.    aspirin (ECOTRIN) 81 MG EC tablet Take 81 mg by mouth once daily.    azelastine (ASTELIN) 137 mcg (0.1 %) nasal spray 1 spray (137 mcg total) by Nasal route 2 (two) times daily.    CALCIUM CARBONATE/VITAMIN D3 (CALCIUM 500 + D ORAL) Take by mouth.    cyanocobalamin (VITAMIN B-12) 1000 MCG tablet Take 1,000 mcg by mouth once daily.     dicyclomine (BENTYL) 10 MG capsule Take 10 mg by mouth 4 (four) times daily before meals and nightly.    glucosamine HCl 1,500 mg Tab Take by mouth.    LACTOBACILLUS ACIDOPHILUS (PROBIOTIC ORAL) Take by mouth.    meloxicam (MOBIC) 15 MG tablet Take 1 tablet (15 mg total) by mouth once daily.     No current facility-administered medications for this visit.      Facility-Administered Medications Ordered in Other Visits   Medication    SUPARTZ injection Syrg 25 mg       Review of patient's allergies indicates:  No Known Allergies    History reviewed. No pertinent family history.    Social History     Social History    Marital status:      Spouse name: N/A    Number of children: N/A    Years of education: N/A     Occupational History    Not on file.     Social History Main Topics    Smoking status: Never Smoker    Smokeless tobacco: Never Used    Alcohol use 2.4 oz/week     2 Glasses of wine, 2 Cans of beer per week      Comment: 2x per week wine or beer    Drug use: No    Sexual activity: Not on  "file     Other Topics Concern    Not on file     Social History Narrative    No narrative on file       Chief Complaint:   Chief Complaint   Patient presents with    Right Knee - Follow-up, Pain     Moderate arthritis to the right knee  Patient showed improvement with the Supartz injection. She is having some stiffness, however not a lot of pain unless walking up and down stairs.       Consulting Physician: No ref. provider found    History of Present Illness:    This is a 72 y.o. year old female who complains of patient is now 4 months status post Supartz injections to the right kneepatient reports pain about 1-2 out of 10 at ti      ROS    Examination:    Vital Signs:    Vitals:    07/02/18 1012   Weight: 65.8 kg (145 lb)   Height: 5' 4" (1.626 m)   PainSc:   1   PainLoc: Knee       This a well-developed, well nourished patient in no acute distress.    Alert and oriented and cooperative to examination.       Physical Exam: right knee-patient is mild varus deformityshe has a slight effusion she has good range of motion she has minimal discomfort    Imaging: X-rays ordered and reviewed today no x-rays done today     Assessment: moderate osteoarthritis of the right knee presently being controlled  With mobic 15 mg    Plan:  Continue to observe at this time the patient does not wish to have any surgical procedures done      DISCLAIMER: This note may have been dictated using voice recognition software and may contain grammatical errors.     NOTE: Consult report sent to referring provider via [employer] EMR.  "

## 2018-09-26 RX ORDER — MELOXICAM 15 MG/1
TABLET ORAL
Qty: 30 TABLET | Refills: 1 | Status: SHIPPED | OUTPATIENT
Start: 2018-09-26 | End: 2019-04-08 | Stop reason: SDUPTHER

## 2018-10-04 ENCOUNTER — TELEPHONE (OUTPATIENT)
Dept: FAMILY MEDICINE | Facility: CLINIC | Age: 73
End: 2018-10-04

## 2018-10-04 NOTE — TELEPHONE ENCOUNTER
----- Message from Holly Cook sent at 10/4/2018  9:11 AM CDT -----  Contact: Patient  Type: Needs Medical Advice    Who Called:  Patient  Symptoms (please be specific):  na  How long has patient had these symptoms:  jess  Pharmacy name and phone #:  jess  Best Call Back Number:   Additional Information: calling to schedule an appt to get a flu shot. Please advise.

## 2018-10-09 ENCOUNTER — CLINICAL SUPPORT (OUTPATIENT)
Dept: INTERNAL MEDICINE | Facility: CLINIC | Age: 73
End: 2018-10-09
Payer: MEDICARE

## 2018-10-09 PROCEDURE — 90662 IIV NO PRSV INCREASED AG IM: CPT | Mod: PBBFAC,PO

## 2019-01-14 ENCOUNTER — TELEPHONE (OUTPATIENT)
Dept: FAMILY MEDICINE | Facility: CLINIC | Age: 74
End: 2019-01-14

## 2019-04-08 ENCOUNTER — OFFICE VISIT (OUTPATIENT)
Dept: ORTHOPEDICS | Facility: CLINIC | Age: 74
End: 2019-04-08
Payer: MEDICARE

## 2019-04-08 VITALS — HEIGHT: 64 IN | WEIGHT: 145 LBS | BODY MASS INDEX: 24.75 KG/M2 | HEART RATE: 67 BPM

## 2019-04-08 DIAGNOSIS — M17.11 PRIMARY OSTEOARTHRITIS OF RIGHT KNEE: Primary | ICD-10-CM

## 2019-04-08 PROCEDURE — 20610 DRAIN/INJ JOINT/BURSA W/O US: CPT | Mod: RT,,, | Performed by: ORTHOPAEDIC SURGERY

## 2019-04-08 PROCEDURE — 73560 PR  X-RAY KNEE 1 OR 2 VIEW: ICD-10-PCS | Mod: FY,RT,, | Performed by: ORTHOPAEDIC SURGERY

## 2019-04-08 PROCEDURE — 73560 X-RAY EXAM OF KNEE 1 OR 2: CPT | Mod: FY,RT,, | Performed by: ORTHOPAEDIC SURGERY

## 2019-04-08 PROCEDURE — 99499 UNLISTED E&M SERVICE: CPT | Mod: ,,, | Performed by: ORTHOPAEDIC SURGERY

## 2019-04-08 PROCEDURE — 20610 LARGE JOINT ASPIRATION/INJECTION: R KNEE: ICD-10-PCS | Mod: RT,,, | Performed by: ORTHOPAEDIC SURGERY

## 2019-04-08 PROCEDURE — 99499 NO LOS: ICD-10-PCS | Mod: ,,, | Performed by: ORTHOPAEDIC SURGERY

## 2019-04-08 RX ORDER — MULTIVITAMIN
1 TABLET ORAL DAILY
COMMUNITY

## 2019-04-08 NOTE — PROCEDURES
Large Joint Aspiration/Injection: R knee  Date/Time: 4/8/2019 10:57 AM  Performed by: Gera Smith MD  Authorized by: Gera Smith MD     Consent Done?:  Yes (Verbal)  Indications:  Pain and joint swelling  Procedure site marked: Yes    Timeout: Prior to procedure the correct patient, procedure, and site was verified      Location:  Knee  Site:  R knee  Prep: Patient was prepped and draped in usual sterile fashion    Needle size:  22 G  Approach:  Lateral  Medications:  25 mg sodium hyaluronate (supartz) 10 mg/mL  Patient tolerance:  Patient tolerated the procedure well with no immediate complications

## 2019-04-08 NOTE — PROGRESS NOTES
Past Medical History:   Diagnosis Date    Cancer     breast, right '97    Cataract        Past Surgical History:   Procedure Laterality Date    APPENDECTOMY      BREAST SURGERY      right mastectomy    COLONOSCOPY N/A 2/21/2018    [facility]    EYE SURGERY      HYSTERECTOMY      LUNG SURGERY  1998    remove cyst on top of right lobe       Current Outpatient Medications   Medication Sig    anastrozole (ARIMIDEX) 1 mg Tab Take 1 mg by mouth once daily.    aspirin (ECOTRIN) 81 MG EC tablet Take 81 mg by mouth once daily.    CALCIUM CARBONATE/VITAMIN D3 (CALCIUM 500 + D ORAL) Take by mouth.    dicyclomine (BENTYL) 10 MG capsule Take 10 mg by mouth 4 (four) times daily before meals and nightly.    glucosamine HCl 1,500 mg Tab Take by mouth.    meloxicam (MOBIC) 15 MG tablet TAKE 1 TABLET BY MOUTH ONCE DAILY    multivitamin (ONE DAILY MULTIVITAMIN) per tablet Take 1 tablet by mouth once daily.    azelastine (ASTELIN) 137 mcg (0.1 %) nasal spray 1 spray (137 mcg total) by Nasal route 2 (two) times daily.     No current facility-administered medications for this visit.      Facility-Administered Medications Ordered in Other Visits   Medication    SUPARTZ injection Syrg 25 mg       Review of patient's allergies indicates:  No Known Allergies    History reviewed. No pertinent family history.    Social History     Socioeconomic History    Marital status:      Spouse name: Not on file    Number of children: Not on file    Years of education: Not on file    Highest education level: Not on file   Occupational History    Not on file   Social Needs    Financial resource strain: Not on file    Food insecurity:     Worry: Not on file     Inability: Not on file    Transportation needs:     Medical: Not on file     Non-medical: Not on file   Tobacco Use    Smoking status: Never Smoker    Smokeless tobacco: Never Used   Substance and Sexual Activity    Alcohol use:  "Yes     Alcohol/week: 2.4 oz     Types: 2 Glasses of wine, 2 Cans of beer per week     Comment: 2x per week wine or beer    Drug use: No    Sexual activity: Not on file   Lifestyle    Physical activity:     Days per week: Not on file     Minutes per session: Not on file    Stress: Not on file   Relationships    Social connections:     Talks on phone: Not on file     Gets together: Not on file     Attends Synagogue service: Not on file     Active member of club or organization: Not on file     Attends meetings of clubs or organizations: Not on file     Relationship status: Not on file   Other Topics Concern    Not on file   Social History Narrative    Not on file       Chief Complaint:   Chief Complaint   Patient presents with    Right Knee - Pain, Follow-up     Moderate osteoarthritis of the right knee presently being controlled  With mobic 15 mg. Patient still not ready to have SX. Patient would like to Start Supartz Today. She is leaving for a cruise in 04/26.     Injections     #1 Supartz Right Knee       Consulting Physician: No ref. provider found    History of Present Illness:    This is a 73 y.o. year old female who complains of patient arthroscopy to the right knee in 2017 she was found to moderate arthritis to the right knee she returned today requesting Supartz injection to the right knee      ROS    Examination:    Vital Signs:    Vitals:    04/08/19 1039   Pulse: 67   Weight: 65.8 kg (145 lb)   Height: 5' 4" (1.626 m)   PainSc:   1   PainLoc: Knee       This a well-developed, well nourished patient in no acute distress.    Alert and oriented and cooperative to examination.       Physical Exam: right knee-patient has a mild effusion is a right knee she has medial knee pain some crepitance on range of motion    Imaging:  AP and lateral x-ray of the right knee shows moderate arthritic changes in the compartment she has about 2 mm of joint space medially     Assessment: moderate arthritis to the " right knee    Plan:  Will inject Supartz today      DISCLAIMER: This note may have been dictated using voice recognition software and may contain grammatical errors.     NOTE: Consult report sent to referring provider via [employer].

## 2019-04-09 RX ORDER — MELOXICAM 15 MG/1
TABLET ORAL
Qty: 30 TABLET | Refills: 1 | Status: SHIPPED | OUTPATIENT
Start: 2019-04-09 | End: 2020-01-20 | Stop reason: SDUPTHER

## 2019-04-11 ENCOUNTER — LAB VISIT (OUTPATIENT)
Dept: LAB | Facility: HOSPITAL | Age: 74
End: 2019-04-11
Attending: INTERNAL MEDICINE
Payer: MEDICARE

## 2019-04-11 DIAGNOSIS — C50.411 MALIGNANT NEOPLASM OF UPPER-OUTER QUADRANT OF RIGHT FEMALE BREAST, UNSPECIFIED ESTROGEN RECEPTOR STATUS: ICD-10-CM

## 2019-04-11 PROBLEM — Z85.3 HISTORY OF BREAST CANCER: Status: ACTIVE | Noted: 2019-04-11

## 2019-04-11 LAB
ALBUMIN SERPL BCP-MCNC: 4.1 G/DL (ref 3.5–5.2)
ALP SERPL-CCNC: 84 U/L (ref 38–145)
ALT SERPL W/O P-5'-P-CCNC: 17 U/L (ref 10–44)
ANION GAP SERPL CALC-SCNC: 8 MMOL/L (ref 8–16)
AST SERPL-CCNC: 25 U/L (ref 14–36)
BASOPHILS # BLD AUTO: 0.03 K/UL (ref 0–0.2)
BASOPHILS NFR BLD: 0.5 % (ref 0–1.9)
BILIRUB SERPL-MCNC: 0.4 MG/DL (ref 0.2–1.3)
BUN SERPL-MCNC: 13 MG/DL (ref 7–18)
CALCIUM SERPL-MCNC: 9.4 MG/DL (ref 8.4–10.2)
CANCER AG15-3 SERPL-ACNC: 20.3 U/ML (ref 0–35)
CEA SERPL-MCNC: 0.5 NG/ML (ref 0–5)
CHLORIDE SERPL-SCNC: 103 MMOL/L (ref 95–110)
CO2 SERPL-SCNC: 27 MMOL/L (ref 22–31)
CREAT SERPL-MCNC: 0.5 MG/DL (ref 0.5–1.4)
DIFFERENTIAL METHOD: ABNORMAL
EOSINOPHIL # BLD AUTO: 0.1 K/UL (ref 0–0.5)
EOSINOPHIL NFR BLD: 2.2 % (ref 0–8)
ERYTHROCYTE [DISTWIDTH] IN BLOOD BY AUTOMATED COUNT: 11.9 % (ref 11.5–14.5)
EST. GFR  (AFRICAN AMERICAN): >60 ML/MIN/1.73 M^2
EST. GFR  (NON AFRICAN AMERICAN): >60 ML/MIN/1.73 M^2
GLUCOSE SERPL-MCNC: 97 MG/DL (ref 70–110)
HCT VFR BLD AUTO: 40.9 % (ref 37–48.5)
HGB BLD-MCNC: 13.6 G/DL (ref 12–16)
IMM GRANULOCYTES # BLD AUTO: 0.01 K/UL (ref 0–0.04)
IMM GRANULOCYTES NFR BLD AUTO: 0.2 % (ref 0–0.5)
LYMPHOCYTES # BLD AUTO: 1.7 K/UL (ref 1–4.8)
LYMPHOCYTES NFR BLD: 28.9 % (ref 18–48)
MCH RBC QN AUTO: 31.4 PG (ref 27–31)
MCHC RBC AUTO-ENTMCNC: 33.3 G/DL (ref 32–36)
MCV RBC AUTO: 95 FL (ref 82–98)
MONOCYTES # BLD AUTO: 0.5 K/UL (ref 0.3–1)
MONOCYTES NFR BLD: 8.8 % (ref 4–15)
NEUTROPHILS # BLD AUTO: 3.5 K/UL (ref 1.8–7.7)
NEUTROPHILS NFR BLD: 59.4 % (ref 38–73)
NRBC BLD-RTO: 0 /100 WBC
PLATELET # BLD AUTO: 276 K/UL (ref 150–350)
PMV BLD AUTO: 9.5 FL (ref 9.2–12.9)
POTASSIUM SERPL-SCNC: 4.3 MMOL/L (ref 3.5–5.1)
PROT SERPL-MCNC: 7.1 G/DL (ref 6–8.4)
RBC # BLD AUTO: 4.33 M/UL (ref 4–5.4)
SODIUM SERPL-SCNC: 138 MMOL/L (ref 136–145)
WBC # BLD AUTO: 5.89 K/UL (ref 3.9–12.7)

## 2019-04-11 PROCEDURE — 86300 IMMUNOASSAY TUMOR CA 15-3: CPT | Mod: PN

## 2019-04-11 PROCEDURE — 82378 CARCINOEMBRYONIC ANTIGEN: CPT | Mod: PN

## 2019-04-11 PROCEDURE — 85025 COMPLETE CBC W/AUTO DIFF WBC: CPT

## 2019-04-11 PROCEDURE — 82378 CARCINOEMBRYONIC ANTIGEN: CPT

## 2019-04-11 PROCEDURE — 36415 COLL VENOUS BLD VENIPUNCTURE: CPT | Mod: PN

## 2019-04-11 PROCEDURE — 80053 COMPREHEN METABOLIC PANEL: CPT | Mod: PN

## 2019-04-11 PROCEDURE — 80053 COMPREHEN METABOLIC PANEL: CPT

## 2019-04-11 PROCEDURE — 85025 COMPLETE CBC W/AUTO DIFF WBC: CPT | Mod: PN

## 2019-04-11 PROCEDURE — 86300 IMMUNOASSAY TUMOR CA 15-3: CPT

## 2019-04-14 LAB — CANCER AG27-29 SERPL-ACNC: 23.9 U/ML

## 2019-04-15 ENCOUNTER — OFFICE VISIT (OUTPATIENT)
Dept: ORTHOPEDICS | Facility: CLINIC | Age: 74
End: 2019-04-15
Payer: MEDICARE

## 2019-04-15 VITALS — HEART RATE: 60 BPM | WEIGHT: 147 LBS | BODY MASS INDEX: 25.1 KG/M2 | HEIGHT: 64 IN

## 2019-04-15 DIAGNOSIS — M17.11 PRIMARY OSTEOARTHRITIS OF RIGHT KNEE: Primary | ICD-10-CM

## 2019-04-15 PROCEDURE — 20610 DRAIN/INJ JOINT/BURSA W/O US: CPT | Mod: RT,,, | Performed by: ORTHOPAEDIC SURGERY

## 2019-04-15 PROCEDURE — 99499 NO LOS: ICD-10-PCS | Mod: ,,, | Performed by: ORTHOPAEDIC SURGERY

## 2019-04-15 PROCEDURE — 99499 UNLISTED E&M SERVICE: CPT | Mod: ,,, | Performed by: ORTHOPAEDIC SURGERY

## 2019-04-15 PROCEDURE — 20610 LARGE JOINT ASPIRATION/INJECTION: R KNEE: ICD-10-PCS | Mod: RT,,, | Performed by: ORTHOPAEDIC SURGERY

## 2019-04-15 NOTE — PROGRESS NOTES
Past Medical History:   Diagnosis Date    Cancer     breast, right '97    Cataract        Past Surgical History:   Procedure Laterality Date    APPENDECTOMY      BREAST SURGERY      right mastectomy    COLONOSCOPY N/A 2/21/2018    [facility]    EYE SURGERY      HYSTERECTOMY      LUNG SURGERY  1998    remove cyst on top of right lobe       Current Outpatient Medications   Medication Sig    anastrozole (ARIMIDEX) 1 mg Tab Take 1 mg by mouth once daily.    aspirin (ECOTRIN) 81 MG EC tablet Take 81 mg by mouth once daily.    CALCIUM CARBONATE/VITAMIN D3 (CALCIUM 500 + D ORAL) Take by mouth.    dicyclomine (BENTYL) 10 MG capsule Take 10 mg by mouth 4 (four) times daily before meals and nightly.    glucosamine HCl 1,500 mg Tab Take by mouth.    meloxicam (MOBIC) 15 MG tablet TAKE 1 TABLET BY MOUTH ONCE DAILY    multivitamin (ONE DAILY MULTIVITAMIN) per tablet Take 1 tablet by mouth once daily.    azelastine (ASTELIN) 137 mcg (0.1 %) nasal spray 1 spray (137 mcg total) by Nasal route 2 (two) times daily.     No current facility-administered medications for this visit.      Facility-Administered Medications Ordered in Other Visits   Medication    SUPARTZ injection Syrg 25 mg       Review of patient's allergies indicates:  No Known Allergies    History reviewed. No pertinent family history.    Social History     Socioeconomic History    Marital status:      Spouse name: Not on file    Number of children: Not on file    Years of education: Not on file    Highest education level: Not on file   Occupational History    Not on file   Social Needs    Financial resource strain: Not on file    Food insecurity:     Worry: Not on file     Inability: Not on file    Transportation needs:     Medical: Not on file     Non-medical: Not on file   Tobacco Use    Smoking status: Never Smoker    Smokeless tobacco: Never Used   Substance and Sexual Activity    Alcohol use:  "Yes     Alcohol/week: 2.4 oz     Types: 2 Glasses of wine, 2 Cans of beer per week     Comment: 2x per week wine or beer    Drug use: No    Sexual activity: Not on file   Lifestyle    Physical activity:     Days per week: Not on file     Minutes per session: Not on file    Stress: Not on file   Relationships    Social connections:     Talks on phone: Not on file     Gets together: Not on file     Attends Congregational service: Not on file     Active member of club or organization: Not on file     Attends meetings of clubs or organizations: Not on file     Relationship status: Not on file   Other Topics Concern    Not on file   Social History Narrative    Not on file       Chief Complaint:   Chief Complaint   Patient presents with    Right Knee - Pain, Injections     Supartz #2 Right Knee        Consulting Physician: No ref. provider found    History of Present Illness:    This is a 73 y.o. year old female who complains of patient returns today forher second Supartz injection to the right knee for osteoarthritis      ROS    Examination:    Vital Signs:    Vitals:    04/15/19 1455   Pulse: 60   Weight: 66.7 kg (147 lb)   Height: 5' 4" (1.626 m)   PainSc:   2   PainLoc: Knee       This a well-developed, well nourished patient in no acute distress.    Alert and oriented and cooperative to examination.       Physical Exam: right knee-o effusion or cellulitis    Imaging:  No x-rays     Assessment: steoarthritis right knee    Plan:  Will inject the right knee with Supartz      DISCLAIMER: This note may have been dictated using voice recognition software and may contain grammatical errors.     NOTE: Consult report sent to referring provider via EP[employer] EMR.  "

## 2019-04-15 NOTE — PROCEDURES
Large Joint Aspiration/Injection: R knee  Date/Time: 4/15/2019 3:03 PM  Performed by: Gera Smith MD  Authorized by: Gera Smith MD     Consent Done?:  Yes (Verbal)  Indications:  Pain and joint swelling  Procedure site marked: Yes    Timeout: Prior to procedure the correct patient, procedure, and site was verified      Location:  Knee  Site:  R knee  Prep: Patient was prepped and draped in usual sterile fashion    Needle size:  22 G  Approach:  Lateral  Medications:  25 mg sodium hyaluronate (supartz) 10 mg/mL  Patient tolerance:  Patient tolerated the procedure well with no immediate complications

## 2019-04-22 ENCOUNTER — OFFICE VISIT (OUTPATIENT)
Dept: ORTHOPEDICS | Facility: CLINIC | Age: 74
End: 2019-04-22
Payer: MEDICARE

## 2019-04-22 VITALS — BODY MASS INDEX: 25.1 KG/M2 | HEART RATE: 59 BPM | HEIGHT: 64 IN | WEIGHT: 147 LBS

## 2019-04-22 DIAGNOSIS — M17.11 PRIMARY OSTEOARTHRITIS OF RIGHT KNEE: Primary | ICD-10-CM

## 2019-04-22 PROCEDURE — 99499 UNLISTED E&M SERVICE: CPT | Mod: ,,, | Performed by: ORTHOPAEDIC SURGERY

## 2019-04-22 PROCEDURE — 20610 LARGE JOINT ASPIRATION/INJECTION: R KNEE: ICD-10-PCS | Mod: RT,,, | Performed by: ORTHOPAEDIC SURGERY

## 2019-04-22 PROCEDURE — 20610 DRAIN/INJ JOINT/BURSA W/O US: CPT | Mod: RT,,, | Performed by: ORTHOPAEDIC SURGERY

## 2019-04-22 PROCEDURE — 99499 NO LOS: ICD-10-PCS | Mod: ,,, | Performed by: ORTHOPAEDIC SURGERY

## 2019-04-22 NOTE — PROCEDURES
Large Joint Aspiration/Injection: R knee  Date/Time: 4/22/2019 9:31 AM  Performed by: Gera Smith MD  Authorized by: Gera Smith MD     Consent Done?:  Yes (Verbal)  Indications:  Pain and joint swelling  Procedure site marked: Yes    Timeout: Prior to procedure the correct patient, procedure, and site was verified      Location:  Knee  Site:  R knee  Prep: Patient was prepped and draped in usual sterile fashion    Needle size:  22 G  Approach:  Lateral  Medications:  25 mg sodium hyaluronate (supartz) 10 mg/mL  Patient tolerance:  Patient tolerated the procedure well with no immediate complications

## 2019-04-22 NOTE — PROGRESS NOTES
Past Medical History:   Diagnosis Date    Cancer     breast, right '97    Cataract        Past Surgical History:   Procedure Laterality Date    APPENDECTOMY      BREAST SURGERY      right mastectomy    COLONOSCOPY N/A 2/21/2018    [facility]    EYE SURGERY      HYSTERECTOMY      LUNG SURGERY  1998    remove cyst on top of right lobe       Current Outpatient Medications   Medication Sig    anastrozole (ARIMIDEX) 1 mg Tab Take 1 mg by mouth once daily.    aspirin (ECOTRIN) 81 MG EC tablet Take 81 mg by mouth once daily.    azelastine (ASTELIN) 137 mcg (0.1 %) nasal spray 1 spray (137 mcg total) by Nasal route 2 (two) times daily.    CALCIUM CARBONATE/VITAMIN D3 (CALCIUM 500 + D ORAL) Take by mouth.    dicyclomine (BENTYL) 10 MG capsule Take 10 mg by mouth 4 (four) times daily before meals and nightly.    glucosamine HCl 1,500 mg Tab Take by mouth.    meloxicam (MOBIC) 15 MG tablet TAKE 1 TABLET BY MOUTH ONCE DAILY    multivitamin (ONE DAILY MULTIVITAMIN) per tablet Take 1 tablet by mouth once daily.     No current facility-administered medications for this visit.      Facility-Administered Medications Ordered in Other Visits   Medication    SUPARTZ injection Syrg 25 mg       Review of patient's allergies indicates:  No Known Allergies    History reviewed. No pertinent family history.    Social History     Socioeconomic History    Marital status:      Spouse name: Not on file    Number of children: Not on file    Years of education: Not on file    Highest education level: Not on file   Occupational History    Not on file   Social Needs    Financial resource strain: Not on file    Food insecurity:     Worry: Not on file     Inability: Not on file    Transportation needs:     Medical: Not on file     Non-medical: Not on file   Tobacco Use    Smoking status: Never Smoker    Smokeless tobacco: Never Used   Substance and Sexual Activity    Alcohol use:  "Yes     Alcohol/week: 2.4 oz     Types: 2 Glasses of wine, 2 Cans of beer per week     Comment: 2x per week wine or beer    Drug use: No    Sexual activity: Not on file   Lifestyle    Physical activity:     Days per week: Not on file     Minutes per session: Not on file    Stress: Not on file   Relationships    Social connections:     Talks on phone: Not on file     Gets together: Not on file     Attends Nondenominational service: Not on file     Active member of club or organization: Not on file     Attends meetings of clubs or organizations: Not on file     Relationship status: Not on file   Other Topics Concern    Not on file   Social History Narrative    Not on file       Chief Complaint:   Chief Complaint   Patient presents with    Right Knee - Injections     Supartz #3 Right Knee        Consulting Physician: No ref. provider found    History of Present Illness:    This is a 73 y.o. year old female who complains of patient returns in follow-up of her right knee for a #3 Supartz injection      ROS    Examination:    Vital Signs:    Vitals:    04/22/19 0918   Pulse: (!) 59   Weight: 66.7 kg (147 lb)   Height: 5' 4" (1.626 m)   PainSc:   1   PainLoc: Knee       This a well-developed, well nourished patient in no acute distress.    Alert and oriented and cooperative to examination.       Physical Exam: right knee-no effusion or cellulitis    Imaging:  No x-rays today     Assessment: steoarthritis of the right knee    Plan:  Ill inject 3 Supartz      DISCLAIMER: This note may have been dictated using voice recognition software and may contain grammatical errors.     NOTE: Consult report sent to referring provider via [facility] EMR.  "

## 2019-05-16 ENCOUNTER — TELEPHONE (OUTPATIENT)
Dept: FAMILY MEDICINE | Facility: CLINIC | Age: 74
End: 2019-05-16

## 2019-05-16 DIAGNOSIS — E78.2 MIXED DYSLIPIDEMIA: Primary | ICD-10-CM

## 2019-05-16 NOTE — TELEPHONE ENCOUNTER
----- Message from Jessica Amador sent at 5/16/2019  9:57 AM CDT -----  Contact: pt  ..Type: Needs Medical Advice    Who Called:  pt  Best Call Back Number:   Additional Information: pt would like to speak with a nurse to get lab orders in before her annual appt in September  Please advise  Thanks

## 2019-05-17 NOTE — TELEPHONE ENCOUNTER
Orders for Lipid Panel/UA placed related to upcoming office visit on 9/24/19. Call placed to patient to assist with scheduling lab appointment. No answer received. Left message requesting return call to office.

## 2019-07-11 ENCOUNTER — OFFICE VISIT (OUTPATIENT)
Dept: GASTROENTEROLOGY | Facility: CLINIC | Age: 74
End: 2019-07-11
Payer: MEDICARE

## 2019-07-11 VITALS
DIASTOLIC BLOOD PRESSURE: 54 MMHG | BODY MASS INDEX: 24.82 KG/M2 | HEART RATE: 65 BPM | WEIGHT: 144.63 LBS | SYSTOLIC BLOOD PRESSURE: 110 MMHG

## 2019-07-11 DIAGNOSIS — R10.30 LOWER ABDOMINAL PAIN: ICD-10-CM

## 2019-07-11 DIAGNOSIS — R14.2 BELCHING: Primary | ICD-10-CM

## 2019-07-11 PROCEDURE — 99999 PR PBB SHADOW E&M-EST. PATIENT-LVL III: ICD-10-PCS | Mod: PBBFAC,,, | Performed by: INTERNAL MEDICINE

## 2019-07-11 PROCEDURE — 99999 PR PBB SHADOW E&M-EST. PATIENT-LVL III: CPT | Mod: PBBFAC,,, | Performed by: INTERNAL MEDICINE

## 2019-07-11 PROCEDURE — 99213 OFFICE O/P EST LOW 20 MIN: CPT | Mod: PBBFAC,PO | Performed by: INTERNAL MEDICINE

## 2019-07-11 PROCEDURE — 99213 PR OFFICE/OUTPT VISIT, EST, LEVL III, 20-29 MIN: ICD-10-PCS | Mod: S$PBB,,, | Performed by: INTERNAL MEDICINE

## 2019-07-11 PROCEDURE — 99213 OFFICE O/P EST LOW 20 MIN: CPT | Mod: S$PBB,,, | Performed by: INTERNAL MEDICINE

## 2019-07-11 RX ORDER — DICYCLOMINE HYDROCHLORIDE 20 MG/1
20 TABLET ORAL 3 TIMES DAILY PRN
Qty: 30 TABLET | Refills: 3 | Status: SHIPPED | OUTPATIENT
Start: 2019-07-11 | End: 2020-09-23

## 2019-07-11 RX ORDER — TRIAMCINOLONE ACETONIDE 1 MG/G
CREAM TOPICAL DAILY PRN
Refills: 3 | COMMUNITY
Start: 2019-06-19

## 2019-07-11 NOTE — PROGRESS NOTES
"[facility] Note    CC: Belching    HPI 73 y.o. female presents for evaluation of intermittent, mild, post-prandial belching worse after eating ice cream. She does frequently drink out of straws but denies sugar free substitutes or carbonated beverages. She has occasional heartburn which is improved with Zantac.  She continues to have intermittent "attacks" of what she assumes is diverticulitis, described as 1-2 days of acute, severe lower abdominal cramping associated with nausea. She takes Bentyl during these episodes which helps and then she feels well. Her last colonoscopy was in February 2018, notable for sigmoid diverticulosis.     Past Medical History:   Diagnosis Date    Cancer     breast, right '97    Cataract          Review of Systems  General ROS: negative for - chills, fever or weight loss  Cardiovascular ROS: no chest pain or dyspnea on exertion  Gastrointestinal ROS: + belching, + intermittent nausea and abdominal pain    Physical Examination  BP (!) 110/54   Pulse 65   Wt 65.6 kg (144 lb 10 oz)   BMI 24.82 kg/m²   General appearance: alert, cooperative, no distress  HENT: Normocephalic, atraumatic, neck symmetrical, no nasal discharge, sclera anicteric   Lungs: clear to auscultation bilaterally, symmetric chest wall expansion bilaterally  Heart: regular rate and rhythm without rub; no displacement of the PMI   Abdomen: soft, nontender,nondistended, BS normoactive, no masses   Extremities: extremities symmetric; no clubbing, cyanosis, or edema        Labs:  Lab Results   Component Value Date    WBC 5.89 04/11/2019    HGB 13.6 04/11/2019    HCT 40.9 04/11/2019    MCV 95 04/11/2019     04/11/2019       Assessment:   73 y.o. female presents for evaluation of frequent belching especially after eating ice cream, as well as intermittent lower abdominal pain and nausea.     Plan:  1.Belching, ? Due to reflux vs lactose intolerance  -Avoid lactose and using straws  -Trial of Zantac " BID x 14 days then may resume PRN use    2.Episodic lower abdominal pain and nausea, ? Uncomplicated diverticulitis vs intestinal spasm  -continue Bentyl as needed  -Instructed should pain become severe or have associated fevers/chills she should present to ED      Cristel Fritz MD  [facility]  [address], [address]  [place] [address]  Office: [phone]  Fax: [phone]

## 2019-09-17 ENCOUNTER — LAB VISIT (OUTPATIENT)
Dept: LAB | Facility: HOSPITAL | Age: 74
End: 2019-09-17
Attending: FAMILY MEDICINE
Payer: MEDICARE

## 2019-09-17 DIAGNOSIS — E78.2 MIXED DYSLIPIDEMIA: ICD-10-CM

## 2019-09-17 LAB
CHOLEST SERPL-MCNC: 198 MG/DL (ref 120–199)
CHOLEST/HDLC SERPL: 3.4 {RATIO} (ref 2–5)
HDLC SERPL-MCNC: 59 MG/DL (ref 40–75)
HDLC SERPL: 29.8 % (ref 20–50)
LDLC SERPL CALC-MCNC: 96.8 MG/DL (ref 63–159)
NONHDLC SERPL-MCNC: 139 MG/DL
TRIGL SERPL-MCNC: 211 MG/DL (ref 30–150)

## 2019-09-17 PROCEDURE — 36415 COLL VENOUS BLD VENIPUNCTURE: CPT | Mod: PO

## 2019-09-17 PROCEDURE — 80061 LIPID PANEL: CPT

## 2019-09-24 ENCOUNTER — OFFICE VISIT (OUTPATIENT)
Dept: FAMILY MEDICINE | Facility: CLINIC | Age: 74
End: 2019-09-24
Payer: MEDICARE

## 2019-09-24 VITALS
DIASTOLIC BLOOD PRESSURE: 62 MMHG | OXYGEN SATURATION: 98 % | TEMPERATURE: 99 F | WEIGHT: 145.5 LBS | BODY MASS INDEX: 24.84 KG/M2 | HEIGHT: 64 IN | HEART RATE: 60 BPM | SYSTOLIC BLOOD PRESSURE: 104 MMHG

## 2019-09-24 DIAGNOSIS — Z23 NEED FOR PNEUMOCOCCAL VACCINATION: ICD-10-CM

## 2019-09-24 DIAGNOSIS — Z23 NEED FOR ZOSTER VACCINE: ICD-10-CM

## 2019-09-24 DIAGNOSIS — E78.2 MIXED DYSLIPIDEMIA: Primary | ICD-10-CM

## 2019-09-24 PROCEDURE — 99999 PR PBB SHADOW E&M-EST. PATIENT-LVL III: ICD-10-PCS | Mod: PBBFAC,,, | Performed by: FAMILY MEDICINE

## 2019-09-24 PROCEDURE — 99213 OFFICE O/P EST LOW 20 MIN: CPT | Mod: PBBFAC,PO,25 | Performed by: FAMILY MEDICINE

## 2019-09-24 PROCEDURE — G0009 ADMIN PNEUMOCOCCAL VACCINE: HCPCS | Mod: PBBFAC,PO

## 2019-09-24 PROCEDURE — 99213 OFFICE O/P EST LOW 20 MIN: CPT | Mod: S$PBB,,, | Performed by: FAMILY MEDICINE

## 2019-09-24 PROCEDURE — 99999 PR PBB SHADOW E&M-EST. PATIENT-LVL III: CPT | Mod: PBBFAC,,, | Performed by: FAMILY MEDICINE

## 2019-09-24 PROCEDURE — 90662 IIV NO PRSV INCREASED AG IM: CPT | Mod: PBBFAC,PO

## 2019-09-24 PROCEDURE — 99213 PR OFFICE/OUTPT VISIT, EST, LEVL III, 20-29 MIN: ICD-10-PCS | Mod: S$PBB,,, | Performed by: FAMILY MEDICINE

## 2019-09-24 RX ORDER — PSYLLIUM SEED
1 PACKET (EA) ORAL DAILY
Refills: 0 | COMMUNITY
Start: 2019-09-24 | End: 2022-12-31

## 2019-09-24 NOTE — PROGRESS NOTES
Subjective:       Patient ID: Liliana Hairston is a 73 y.o. female.    Chief Complaint: leg cramping    Hypertension   This is a recurrent problem. The current episode started more than 1 year ago. The problem has been resolved since onset. The problem is controlled. Pertinent negatives include no anxiety, blurred vision, chest pain, headaches, malaise/fatigue, neck pain, orthopnea, palpitations, peripheral edema, PND or shortness of breath. There are no associated agents to hypertension. Risk factors for coronary artery disease include sedentary lifestyle and post-menopausal state. The current treatment provides significant improvement.     Review of Systems   Constitutional: Negative.  Negative for activity change, appetite change, chills, diaphoresis and malaise/fatigue.   HENT: Negative.  Negative for facial swelling, hearing loss, nosebleeds, postnasal drip, sneezing and trouble swallowing.    Eyes: Negative for blurred vision, photophobia, pain, discharge, redness, itching and visual disturbance.   Respiratory: Negative for apnea, cough, chest tightness, shortness of breath and wheezing.    Cardiovascular: Negative.  Negative for chest pain, palpitations, orthopnea, leg swelling and PND.   Gastrointestinal: Negative.  Negative for abdominal distention, abdominal pain, anal bleeding, blood in stool and diarrhea.   Endocrine: Negative.  Negative for cold intolerance, heat intolerance, polydipsia, polyphagia and polyuria.   Genitourinary: Negative.  Negative for difficulty urinating, dysuria, frequency, genital sores, hematuria, pelvic pain, urgency, vaginal bleeding and vaginal discharge.   Musculoskeletal: Negative.  Negative for back pain, gait problem, neck pain and neck stiffness.   Skin: Negative.  Negative for color change, pallor and rash.   Allergic/Immunologic: Negative.  Negative for environmental allergies and food allergies.   Neurological: Negative.  Negative for dizziness, tremors, seizures,  syncope, speech difficulty, numbness and headaches.   Hematological: Negative for adenopathy.   Psychiatric/Behavioral: Negative for agitation, behavioral problems, confusion, decreased concentration, hallucinations, self-injury and sleep disturbance. The patient is not nervous/anxious and is not hyperactive.        Patient Active Problem List   Diagnosis    Cancer    Primary osteoarthritis of right knee    Acute deep vein thrombosis (DVT) of popliteal vein    Bilateral sensorineural hearing loss    Tinnitus of both ears    Abdominal pain    Diverticulosis of large intestine without hemorrhage    History of breast cancer       Objective:      Physical Exam   Constitutional: She is oriented to person, place, and time. She appears well-developed and well-nourished.   HENT:   Head: Normocephalic and atraumatic.   Right Ear: External ear normal.   Left Ear: External ear normal.   Nose: Nose normal.   Mouth/Throat: No oropharyngeal exudate.   Eyes: Pupils are equal, round, and reactive to light. Conjunctivae and EOM are normal. Right eye exhibits no discharge. Left eye exhibits no discharge. No scleral icterus.   Neck: Normal range of motion. Neck supple. No JVD present. No tracheal deviation present. No thyromegaly present.   Cardiovascular: Normal rate, normal heart sounds and intact distal pulses. Exam reveals no gallop and no friction rub.   No murmur heard.  Pulmonary/Chest: Effort normal. No stridor. No respiratory distress. She has no wheezes. She has no rales. She exhibits no tenderness.   Abdominal: Soft. Bowel sounds are normal. She exhibits no distension and no mass. There is no tenderness. There is no rebound and no guarding.   Musculoskeletal: Normal range of motion. She exhibits no edema.   Lymphadenopathy:     She has no cervical adenopathy.   Neurological: She is alert and oriented to person, place, and time. She displays normal reflexes. No cranial nerve deficit. She exhibits normal muscle tone.  Coordination normal.   Skin: Skin is dry. No rash noted. She is not diaphoretic. No erythema. No pallor.   Psychiatric: She has a normal mood and affect. Her behavior is normal. Judgment and thought content normal.   Vitals reviewed.      Lab Results   Component Value Date    WBC 5.89 04/11/2019    HGB 13.6 04/11/2019    HCT 40.9 04/11/2019     04/11/2019    CHOL 198 09/17/2019    TRIG 211 (H) 09/17/2019    HDL 59 09/17/2019    ALT 17 04/11/2019    AST 25 04/11/2019     04/11/2019    K 4.3 04/11/2019     04/11/2019    CREATININE 0.50 04/11/2019    BUN 13 04/11/2019    CO2 27 04/11/2019    TSH 0.946 11/16/2010     The 10-year ASCVD risk score (Ann KAUR Jr., et al., 2013) is: 7.9%    Values used to calculate the score:      Age: 73 years      Sex: Female      Is Non- : No      Diabetic: No      Tobacco smoker: No      Systolic Blood Pressure: 104 mmHg      Is BP treated: No      HDL Cholesterol: 59 mg/dL      Total Cholesterol: 198 mg/dL    Assessment:       1. Mixed dyslipidemia    2. Need for zoster vaccine    3. Need for pneumococcal vaccination        Plan:       Mixed dyslipidemia  -     Lipid panel; Future; Expected date: 09/24/2019  -     METAMUCIL, SUGAR, Powd; Take 1 Package by mouth once daily.; Refill: 0    Need for zoster vaccine  -     varicella-zoster [id number], PF, (SHINGRIX, PF,) 50 mcg/0.5 mL injection; Inject 0.5 mLs into the muscle once. for 1 dose  Dispense: 0.5 mL; Refill: 1    Need for pneumococcal vaccination  -     (In Office Administered) Pneumococcal Conjugate Vaccine (13 Valent) (IM)    Other orders  -     Influenza - High Dose (65+) (PF) (IM)    HT controlled. Breast cancer survival.  Patient readiness: acceptance and barriers:readiness    During the course of the visit the patient was educated and counseled about the following:     Hypertension:   Regular aerobic exercise.    Goals: Hypertension: Reduce Blood Pressure    Did patient meet  goals/outcomes: No    The following self management tools provided: excercise log    Patient Instructions (the written plan) was given to the patient/family.     Time spent with patient: 30 minutes    Barriers to medications present (no )    Adverse reactions to current medications (no)    Over the counter medications reviewed (Yes)        30-35minutes spent counseling patient on diet, exercise, and weight loss.  This has been fully explained to the patient, who indicates understanding.

## 2019-09-24 NOTE — PROGRESS NOTES
Patient identified by name and  with verbal feedback. Prevnar Vaccine 0.5 mL administered IM to left upper gluteus using aseptic technique. Patient tolerated well, no adverse reactions noted/reported.

## 2019-09-24 NOTE — PATIENT INSTRUCTIONS

## 2019-09-30 ENCOUNTER — TELEPHONE (OUTPATIENT)
Dept: FAMILY MEDICINE | Facility: CLINIC | Age: 74
End: 2019-09-30

## 2019-09-30 NOTE — TELEPHONE ENCOUNTER
Return Call placed to patient regarding lab results. No answer received. Left message requesting return call to office.

## 2019-09-30 NOTE — TELEPHONE ENCOUNTER
----- Message from Quyen Rene sent at 9/30/2019 10:00 AM CDT -----  Contact: [phone]/self  Patient is returning your call  Please call back to assist at [phone]

## 2019-10-03 ENCOUNTER — LAB VISIT (OUTPATIENT)
Dept: LAB | Facility: HOSPITAL | Age: 74
End: 2019-10-03
Attending: INTERNAL MEDICINE
Payer: MEDICARE

## 2019-10-03 DIAGNOSIS — Z85.3 HISTORY OF BREAST CANCER: ICD-10-CM

## 2019-10-03 LAB
ALBUMIN SERPL BCP-MCNC: 4.2 G/DL (ref 3.5–5.2)
ALP SERPL-CCNC: 82 U/L (ref 55–135)
ALT SERPL W/O P-5'-P-CCNC: 20 U/L (ref 10–44)
ANION GAP SERPL CALC-SCNC: 9 MMOL/L (ref 8–16)
AST SERPL-CCNC: 22 U/L (ref 10–40)
BASOPHILS # BLD AUTO: 0.05 K/UL (ref 0–0.2)
BASOPHILS NFR BLD: 0.7 % (ref 0–1.9)
BILIRUB SERPL-MCNC: 0.7 MG/DL (ref 0.1–1)
BUN SERPL-MCNC: 10 MG/DL (ref 8–23)
CALCIUM SERPL-MCNC: 9.5 MG/DL (ref 8.7–10.5)
CEA SERPL-MCNC: 1.1 NG/ML (ref 0–5)
CHLORIDE SERPL-SCNC: 101 MMOL/L (ref 95–110)
CO2 SERPL-SCNC: 28 MMOL/L (ref 23–29)
CREAT SERPL-MCNC: 0.7 MG/DL (ref 0.5–1.4)
DIFFERENTIAL METHOD: ABNORMAL
EOSINOPHIL # BLD AUTO: 0.1 K/UL (ref 0–0.5)
EOSINOPHIL NFR BLD: 1.2 % (ref 0–8)
ERYTHROCYTE [DISTWIDTH] IN BLOOD BY AUTOMATED COUNT: 12.4 % (ref 11.5–14.5)
EST. GFR  (AFRICAN AMERICAN): >60 ML/MIN/1.73 M^2
EST. GFR  (NON AFRICAN AMERICAN): >60 ML/MIN/1.73 M^2
GLUCOSE SERPL-MCNC: 86 MG/DL (ref 70–110)
HCT VFR BLD AUTO: 44.2 % (ref 37–48.5)
HGB BLD-MCNC: 14.7 G/DL (ref 12–16)
IMM GRANULOCYTES # BLD AUTO: 0.04 K/UL (ref 0–0.04)
IMM GRANULOCYTES NFR BLD AUTO: 0.5 % (ref 0–0.5)
LYMPHOCYTES # BLD AUTO: 2.1 K/UL (ref 1–4.8)
LYMPHOCYTES NFR BLD: 27 % (ref 18–48)
MCH RBC QN AUTO: 31.5 PG (ref 27–31)
MCHC RBC AUTO-ENTMCNC: 33.3 G/DL (ref 32–36)
MCV RBC AUTO: 95 FL (ref 82–98)
MONOCYTES # BLD AUTO: 0.6 K/UL (ref 0.3–1)
MONOCYTES NFR BLD: 7.8 % (ref 4–15)
NEUTROPHILS # BLD AUTO: 4.8 K/UL (ref 1.8–7.7)
NEUTROPHILS NFR BLD: 62.8 % (ref 38–73)
NRBC BLD-RTO: 0 /100 WBC
PLATELET # BLD AUTO: 303 K/UL (ref 150–350)
PMV BLD AUTO: 9 FL (ref 9.2–12.9)
POTASSIUM SERPL-SCNC: 4.2 MMOL/L (ref 3.5–5.1)
PROT SERPL-MCNC: 7.6 G/DL (ref 6–8.4)
RBC # BLD AUTO: 4.67 M/UL (ref 4–5.4)
SODIUM SERPL-SCNC: 138 MMOL/L (ref 136–145)
WBC # BLD AUTO: 7.67 K/UL (ref 3.9–12.7)

## 2019-10-03 PROCEDURE — 82378 CARCINOEMBRYONIC ANTIGEN: CPT

## 2019-10-03 PROCEDURE — 86300 IMMUNOASSAY TUMOR CA 15-3: CPT

## 2019-10-03 PROCEDURE — 80053 COMPREHEN METABOLIC PANEL: CPT

## 2019-10-03 PROCEDURE — 86300 IMMUNOASSAY TUMOR CA 15-3: CPT | Mod: 91

## 2019-10-03 PROCEDURE — 85025 COMPLETE CBC W/AUTO DIFF WBC: CPT

## 2019-10-04 LAB
CANCER AG15-3 SERPL-ACNC: 21.2 U/ML (ref 0–25)
CANCER AG27-29 SERPL-ACNC: 26.2 U/ML (ref 0–38.6)

## 2020-01-20 ENCOUNTER — HOSPITAL ENCOUNTER (OUTPATIENT)
Dept: RADIOLOGY | Facility: HOSPITAL | Age: 75
Discharge: HOME | End: 2020-01-20
Attending: FAMILY MEDICINE
Payer: MEDICARE

## 2020-01-20 ENCOUNTER — TELEPHONE (OUTPATIENT)
Dept: FAMILY MEDICINE | Facility: CLINIC | Age: 75
End: 2020-01-20

## 2020-01-20 ENCOUNTER — OFFICE VISIT (OUTPATIENT)
Dept: FAMILY MEDICINE | Facility: CLINIC | Age: 75
End: 2020-01-20
Payer: MEDICARE

## 2020-01-20 VITALS
OXYGEN SATURATION: 97 % | SYSTOLIC BLOOD PRESSURE: 134 MMHG | TEMPERATURE: 99 F | HEIGHT: 64 IN | HEART RATE: 63 BPM | WEIGHT: 146.63 LBS | BODY MASS INDEX: 25.03 KG/M2 | DIASTOLIC BLOOD PRESSURE: 62 MMHG

## 2020-01-20 DIAGNOSIS — E78.49 OTHER HYPERLIPIDEMIA: ICD-10-CM

## 2020-01-20 DIAGNOSIS — J01.00 ACUTE NON-RECURRENT MAXILLARY SINUSITIS: ICD-10-CM

## 2020-01-20 DIAGNOSIS — R05.9 COUGH: ICD-10-CM

## 2020-01-20 DIAGNOSIS — M54.50 ACUTE MIDLINE LOW BACK PAIN WITHOUT SCIATICA: Primary | ICD-10-CM

## 2020-01-20 DIAGNOSIS — M54.50 ACUTE MIDLINE LOW BACK PAIN WITHOUT SCIATICA: ICD-10-CM

## 2020-01-20 PROBLEM — G44.011 INTRACTABLE EPISODIC CLUSTER HEADACHE: Status: ACTIVE | Noted: 2020-01-20

## 2020-01-20 PROBLEM — R10.9 ABDOMINAL PAIN: Status: RESOLVED | Noted: 2018-02-21 | Resolved: 2020-01-20

## 2020-01-20 PROCEDURE — 99999 PR PBB SHADOW E&M-EST. PATIENT-LVL IV: CPT | Mod: PBBFAC,,, | Performed by: FAMILY MEDICINE

## 2020-01-20 PROCEDURE — 1125F PR PAIN SEVERITY QUANTIFIED, PAIN PRESENT: ICD-10-PCS | Mod: ,,, | Performed by: FAMILY MEDICINE

## 2020-01-20 PROCEDURE — 99214 PR OFFICE/OUTPT VISIT, EST, LEVL IV, 30-39 MIN: ICD-10-PCS | Mod: S$PBB,,, | Performed by: FAMILY MEDICINE

## 2020-01-20 PROCEDURE — 1125F AMNT PAIN NOTED PAIN PRSNT: CPT | Mod: ,,, | Performed by: FAMILY MEDICINE

## 2020-01-20 PROCEDURE — 1159F MED LIST DOCD IN RCRD: CPT | Mod: ,,, | Performed by: FAMILY MEDICINE

## 2020-01-20 PROCEDURE — 99214 OFFICE O/P EST MOD 30 MIN: CPT | Mod: S$PBB,,, | Performed by: FAMILY MEDICINE

## 2020-01-20 PROCEDURE — 72100 X-RAY EXAM L-S SPINE 2/3 VWS: CPT | Mod: 26,,, | Performed by: RADIOLOGY

## 2020-01-20 PROCEDURE — 72100 XR LUMBAR SPINE AP AND LATERAL: ICD-10-PCS | Mod: 26,,, | Performed by: RADIOLOGY

## 2020-01-20 PROCEDURE — 1159F PR MEDICATION LIST DOCUMENTED IN MEDICAL RECORD: ICD-10-PCS | Mod: ,,, | Performed by: FAMILY MEDICINE

## 2020-01-20 PROCEDURE — 99214 OFFICE O/P EST MOD 30 MIN: CPT | Mod: PBBFAC,PO,25 | Performed by: FAMILY MEDICINE

## 2020-01-20 PROCEDURE — 72100 X-RAY EXAM L-S SPINE 2/3 VWS: CPT | Mod: TC,FY,PO

## 2020-01-20 PROCEDURE — 99999 PR PBB SHADOW E&M-EST. PATIENT-LVL IV: ICD-10-PCS | Mod: PBBFAC,,, | Performed by: FAMILY MEDICINE

## 2020-01-20 RX ORDER — MONTELUKAST SODIUM 10 MG/1
10 TABLET ORAL NIGHTLY
Qty: 30 TABLET | Refills: 0 | Status: SHIPPED | OUTPATIENT
Start: 2020-01-20 | End: 2020-02-19

## 2020-01-20 RX ORDER — AZELASTINE 1 MG/ML
1 SPRAY, METERED NASAL 2 TIMES DAILY
Qty: 30 ML | Refills: 5 | Status: SHIPPED | OUTPATIENT
Start: 2020-01-20 | End: 2022-06-14 | Stop reason: SDUPTHER

## 2020-01-20 RX ORDER — BENZONATATE 100 MG/1
100 CAPSULE ORAL 3 TIMES DAILY PRN
Qty: 30 CAPSULE | Refills: 0 | Status: SHIPPED | OUTPATIENT
Start: 2020-01-20 | End: 2020-01-30

## 2020-01-20 RX ORDER — CYCLOBENZAPRINE HCL 5 MG
5 TABLET ORAL NIGHTLY
Qty: 10 TABLET | Refills: 0 | Status: SHIPPED | OUTPATIENT
Start: 2020-01-20 | End: 2020-01-30

## 2020-01-20 RX ORDER — MELOXICAM 15 MG/1
15 TABLET ORAL DAILY
Qty: 30 TABLET | Refills: 0 | Status: SHIPPED | OUTPATIENT
Start: 2020-01-20 | End: 2020-10-26 | Stop reason: SDUPTHER

## 2020-01-20 NOTE — PROGRESS NOTES
Subjective:       Patient ID: Liliana Hairston is a 74 y.o. female.    Chief Complaint: Sinus Problem; Cough (dry); and Back Pain    Sinus Problem   This is a new problem. The current episode started in the past 7 days. The problem has been gradually worsening since onset. There has been no fever. The pain is mild. Associated symptoms include congestion, coughing, sinus pressure and a sore throat. Pertinent negatives include no chills, diaphoresis, ear pain, headaches, hoarse voice, neck pain, shortness of breath, sneezing or swollen glands. (Postnasal drip, cough with white sputum) Past treatments include nothing. The treatment provided no relief.   Back Pain   This is a new problem. The current episode started yesterday. The problem occurs constantly. The problem has been gradually worsening since onset. The pain is present in the lumbar spine. The quality of the pain is described as aching. The pain does not radiate. The pain is at a severity of 6/10. The pain is moderate. The pain is the same all the time. The symptoms are aggravated by lying down, position, sitting and standing. Stiffness is present in the morning and at night. Pertinent negatives include no abdominal pain, bladder incontinence, bowel incontinence, chest pain, dysuria, headaches, leg pain, numbness, paresis, perianal numbness, tingling or weakness. Risk factors include history of cancer and menopause. She has tried NSAIDs and ice for the symptoms. The treatment provided mild relief.   Hyperlipidemia   This is a chronic problem. The current episode started more than 1 year ago. The problem is uncontrolled. Recent lipid tests were reviewed and are high. She has no history of chronic renal disease, diabetes, hypothyroidism or liver disease. There are no known factors aggravating her hyperlipidemia. Pertinent negatives include no chest pain, focal sensory loss, focal weakness, leg pain or shortness of breath. She is currently on no  antihyperlipidemic treatment. The current treatment provides no improvement of lipids. Compliance problems include adherence to exercise and adherence to diet.  Risk factors for coronary artery disease include dyslipidemia, family history and post-menopausal.     Past medical history, past social history was reviewed and discussed with the patient.    Review of Systems   Constitutional: Negative for activity change, appetite change, chills and diaphoresis.   HENT: Positive for congestion, postnasal drip, rhinorrhea, sinus pressure, sinus pain and sore throat. Negative for ear discharge, ear pain, hoarse voice and sneezing.    Eyes: Negative for discharge and itching.   Respiratory: Positive for cough. Negative for choking, chest tightness and shortness of breath.    Cardiovascular: Negative for chest pain and leg swelling.   Gastrointestinal: Negative for abdominal distention, abdominal pain and bowel incontinence.   Endocrine: Negative for cold intolerance and heat intolerance.   Genitourinary: Negative for bladder incontinence, dysuria and flank pain.   Musculoskeletal: Positive for back pain. Negative for arthralgias and neck pain.   Skin: Negative for pallor and rash.   Allergic/Immunologic: Negative for environmental allergies and food allergies.   Neurological: Negative for dizziness, tingling, focal weakness, facial asymmetry, weakness, numbness and headaches.   Hematological: Negative for adenopathy. Does not bruise/bleed easily.   Psychiatric/Behavioral: Negative for agitation, confusion and sleep disturbance.       Objective:      Physical Exam   Constitutional: She appears well-developed and well-nourished. No distress.   HENT:   Head: Normocephalic and atraumatic.   Right Ear: External ear normal.   Left Ear: External ear normal.   Nose: Right sinus exhibits maxillary sinus tenderness and frontal sinus tenderness. Left sinus exhibits maxillary sinus tenderness and frontal sinus tenderness.    Mouth/Throat: Posterior oropharyngeal erythema present. No oropharyngeal exudate.   Eyes: Pupils are equal, round, and reactive to light. Conjunctivae are normal. Right eye exhibits no discharge. Left eye exhibits no discharge. No scleral icterus.   Neck: Neck supple. No tracheal deviation present. No thyromegaly present.   Cardiovascular: Normal rate, regular rhythm and normal heart sounds.   No murmur heard.  Pulmonary/Chest: Effort normal and breath sounds normal. No respiratory distress. She has no wheezes.   Abdominal: She exhibits no distension. There is no tenderness.   Musculoskeletal: She exhibits tenderness (Lumbar spine). She exhibits no deformity.   Neurological: No cranial nerve deficit. Coordination normal.   Skin: No rash noted. She is not diaphoretic. No erythema. No pallor.   Psychiatric: She has a normal mood and affect. Her behavior is normal. Judgment and thought content normal.   Nursing note and vitals reviewed.      Assessment:       1. Acute midline low back pain without sciatica    2. Cough    3. Other hyperlipidemia    4. Acute non-recurrent maxillary sinusitis        Plan:       Acute midline low back pain without sciatica:  New problem, workup needed  -     meloxicam (MOBIC) 15 MG tablet; Take 1 tablet (15 mg total) by mouth once daily.  Dispense: 30 tablet; Refill: 0  -     cyclobenzaprine (FLEXERIL) 5 MG tablet; Take 1 tablet (5 mg total) by mouth every evening. for 10 days  Dispense: 10 tablet; Refill: 0  -     X-Ray Lumbar Spine AP And Lateral; Future; Expected date: 01/20/2020    Cough:  New problem, next visit workup    Other hyperlipidemia:  Uncontrolled    Acute non-recurrent maxillary sinusitis:  New problem, next visit workup  -     azelastine (ASTELIN) 137 mcg (0.1 %) nasal spray; 1 spray (137 mcg total) by Nasal route 2 (two) times daily.  Dispense: 30 mL; Refill: 5  -     montelukast (SINGULAIR) 10 mg tablet; Take 1 tablet (10 mg total) by mouth every evening.  Dispense: 30  tablet; Refill: 0    Recommend the patient to use Astelin spray twice daily as directed daily, montelukast 10 mg at bedtime for sinus problems, also use a Neti pot with sterilized water daily order a nasal saline spray.  If the symptoms get worse, the patient will notify us immediately and will order antibiotics for her.  Meloxicam 15 mg 1 tablet p.o. q.day with meals and Flexeril as needed was recommended for back pain, will order x-rays as the patient has history of breast cancer.Patient agreed with assessment and plan. Patient verbalized understanding.

## 2020-01-20 NOTE — TELEPHONE ENCOUNTER
Patient was seen in clinic today and is now requesting something being sent in for her cough, please advise

## 2020-01-20 NOTE — TELEPHONE ENCOUNTER
----- Message from Wendi Hampton sent at 1/20/2020  4:03 PM CST -----  Contact: Patient  Type: Needs Medical Advice    Who Called:  Patient  Pharmacy name and phone #:    [id number] - [place] - [address] [address] [facility] [place] [address]  Phone: [phone] Fax: [phone]  Best Call Back Number: [phone]  Additional Information: Patient was seen in the office today and did not get a medication for cough like she thought she would. Wanting to know if something can be called in.

## 2020-01-20 NOTE — TELEPHONE ENCOUNTER
Attempted to call pt. To advise on message attached below. No answer. Left message to call the office back.

## 2020-01-20 NOTE — TELEPHONE ENCOUNTER
----- Message from Alicia Fowler sent at 1/20/2020  4:29 PM CST -----  Type:  Patient Returning Call    Who Called:  Simi  Who Left Message for Patient:  Tanisha  Does the patient know what this is regarding?:  medication  Best Call Back Number:  [phone]  Additional Information:  Thank you!

## 2020-01-20 NOTE — PATIENT INSTRUCTIONS
Eating Heart-Healthy Food: Using the DASH Plan    Eating for your heart doesnt have to be hard or boring. You just need to know how to make healthier choices. The DASH eating plan has been developed to help you do just that. DASH stands for Dietary Approaches to Stop Hypertension. It is a plan that has been proven to be healthier for your heart and to lower your risk for high blood pressure. It can also help lower your risk for cancer, heart disease, osteoporosis, and diabetes.  Choosing from each food group  Choose foods from each of the food groups below each day. Try to get the recommended number of servings for each food group. The serving numbers are based on a diet of 2,000 calories a day. Talk to your doctor if youre unsure about your calorie needs. Along with getting the correct servings, the DASH plan also recommends a sodium intake less than 2,300 mg per day.        Grains  Servings: 6 to 8 a day  A serving is:  · 1 slice bread  · 1 ounce dry cereal  · Half a cup cooked rice, pasta or cereal  Best choices: Whole grains and any grains high in fiber. Vegetables  Servings: 4 to 5 a day  A serving is:  · 1 cup raw leafy vegetable  · Half a cup cut-up raw or cooked vegetable  · Half a cup vegetable juice  Best choices: Fresh or frozen vegetables prepared without added salt or fat.   Fruits  Servings: 4 to 5 a day  A serving is:  · 1 medium fruit  · One-quarter cup dried fruit  · Half a cup fresh, frozen, or canned fruit  · Half a cup of 100% fruit juices  Best choices: A variety of fresh fruits of different colors. Whole fruits are a better choice than fruit juices. Low-fat or fat-free dairy  Servings: 2 to 3 a day  A serving is:  · 1 cup milk  · 1 cup yogurt  · One and a half ounces cheese  Best choices: Skim or 1% milk, low-fat or fat-free yogurt or buttermilk, and low-fat cheeses.         Lean meats, poultry, fish  Servings: 6 or fewer a day  A serving is:  · 1 ounce cooked meats, poultry, or fish  · 1  egg  Best choices: Lean poultry and fish. Trim away visible fat. Broil, grill, roast, or boil instead of frying. Remove skin from poultry before eating. Limit how much red meat you eat.  Nuts, seeds, beans  Servings: 4 to 5 a week  A serving is:  · One-third cup nuts (one and a half ounces)  · 2 tablespoons nut butter or seeds  · Half a cup cooked dry beans or legumes  Best choices: Dry roasted nuts with no salt added, lentils, kidney beans, garbanzo beans, and whole irvin beans.   Fats and oils  Servings: 2 to 3 a day  A serving is:  · 1 teaspoon vegetable oil  · 1 teaspoon soft margarine  · 1 tablespoon mayonnaise  · 2 tablespoons salad dressing  Best choices: Nut and vegetable oils (nontropical vegetable oils), such as olive and canola oil. Sweets  Servings: 5 a week or fewer  A serving is:  · 1 tablespoon sugar, maple syrup, or honey  · 1 tablespoon jam or jelly  · 1 half-ounce jelly beans (about 15)  · 1 cup lemonade  Best choices: Dried fruit can be a satisfying sweet. Choose low-fat sweets. And watch your serving sizes!      For more on the DASH eating plan, visit:  www.nhlbi.nih.gov/health/health-topics/topics/dash   Date Last Reviewed: 6/1/2016  © 7444-1236 [employer]. [address] [place] [address], [place] [address]. All rights reserved. This information is not intended as a substitute for professional medical care. Always follow your healthcare professional's instructions.

## 2020-03-10 ENCOUNTER — PATIENT OUTREACH (OUTPATIENT)
Dept: ADMINISTRATIVE | Facility: HOSPITAL | Age: 75
End: 2020-03-10

## 2020-03-10 NOTE — PROGRESS NOTES
Chart review completed 03/10/2020.  Care Everywhere updates requested and reviewed.  Immunizations reconciled. Media reviewed.      updated with external MAMMO, DEXA report.

## 2020-04-07 ENCOUNTER — LAB VISIT (OUTPATIENT)
Dept: LAB | Facility: HOSPITAL | Age: 75
End: 2020-04-07
Attending: INTERNAL MEDICINE
Payer: MEDICARE

## 2020-04-07 DIAGNOSIS — Z85.3 HISTORY OF BREAST CANCER: ICD-10-CM

## 2020-04-07 LAB
ALBUMIN SERPL BCP-MCNC: 4.4 G/DL (ref 3.5–5.2)
ALP SERPL-CCNC: 76 U/L (ref 55–135)
ALT SERPL W/O P-5'-P-CCNC: 16 U/L (ref 10–44)
ANION GAP SERPL CALC-SCNC: 10 MMOL/L (ref 8–16)
AST SERPL-CCNC: 19 U/L (ref 10–40)
BASOPHILS # BLD AUTO: 0.04 K/UL (ref 0–0.2)
BASOPHILS NFR BLD: 0.6 % (ref 0–1.9)
BILIRUB SERPL-MCNC: 0.8 MG/DL (ref 0.1–1)
BUN SERPL-MCNC: 10 MG/DL (ref 8–23)
CALCIUM SERPL-MCNC: 9.6 MG/DL (ref 8.7–10.5)
CEA SERPL-MCNC: 0.8 NG/ML (ref 0–5)
CHLORIDE SERPL-SCNC: 100 MMOL/L (ref 95–110)
CO2 SERPL-SCNC: 28 MMOL/L (ref 23–29)
CREAT SERPL-MCNC: 0.6 MG/DL (ref 0.5–1.4)
DIFFERENTIAL METHOD: ABNORMAL
EOSINOPHIL # BLD AUTO: 0.1 K/UL (ref 0–0.5)
EOSINOPHIL NFR BLD: 1.7 % (ref 0–8)
ERYTHROCYTE [DISTWIDTH] IN BLOOD BY AUTOMATED COUNT: 12.4 % (ref 11.5–14.5)
EST. GFR  (AFRICAN AMERICAN): >60 ML/MIN/1.73 M^2
EST. GFR  (NON AFRICAN AMERICAN): >60 ML/MIN/1.73 M^2
GLUCOSE SERPL-MCNC: 87 MG/DL (ref 70–110)
HCT VFR BLD AUTO: 43.3 % (ref 37–48.5)
HGB BLD-MCNC: 14.5 G/DL (ref 12–16)
IMM GRANULOCYTES # BLD AUTO: 0.02 K/UL (ref 0–0.04)
IMM GRANULOCYTES NFR BLD AUTO: 0.3 % (ref 0–0.5)
LYMPHOCYTES # BLD AUTO: 2.2 K/UL (ref 1–4.8)
LYMPHOCYTES NFR BLD: 31.4 % (ref 18–48)
MCH RBC QN AUTO: 31.8 PG (ref 27–31)
MCHC RBC AUTO-ENTMCNC: 33.5 G/DL (ref 32–36)
MCV RBC AUTO: 95 FL (ref 82–98)
MONOCYTES # BLD AUTO: 0.7 K/UL (ref 0.3–1)
MONOCYTES NFR BLD: 9.3 % (ref 4–15)
NEUTROPHILS # BLD AUTO: 4 K/UL (ref 1.8–7.7)
NEUTROPHILS NFR BLD: 56.7 % (ref 38–73)
NRBC BLD-RTO: 0 /100 WBC
PLATELET # BLD AUTO: 307 K/UL (ref 150–350)
PMV BLD AUTO: 9.1 FL (ref 9.2–12.9)
POTASSIUM SERPL-SCNC: 3.8 MMOL/L (ref 3.5–5.1)
PROT SERPL-MCNC: 7.4 G/DL (ref 6–8.4)
RBC # BLD AUTO: 4.56 M/UL (ref 4–5.4)
SODIUM SERPL-SCNC: 138 MMOL/L (ref 136–145)
WBC # BLD AUTO: 7 K/UL (ref 3.9–12.7)

## 2020-04-07 PROCEDURE — 82378 CARCINOEMBRYONIC ANTIGEN: CPT

## 2020-04-07 PROCEDURE — 80053 COMPREHEN METABOLIC PANEL: CPT

## 2020-04-07 PROCEDURE — 36415 COLL VENOUS BLD VENIPUNCTURE: CPT

## 2020-04-07 PROCEDURE — 85025 COMPLETE CBC W/AUTO DIFF WBC: CPT

## 2020-04-07 PROCEDURE — 86300 IMMUNOASSAY TUMOR CA 15-3: CPT | Mod: 91

## 2020-04-07 PROCEDURE — 86300 IMMUNOASSAY TUMOR CA 15-3: CPT

## 2020-04-09 LAB
CANCER AG15-3 SERPL-ACNC: 23.3 U/ML (ref 0–25)
CANCER AG27-29 SERPL-ACNC: 30.3 U/ML (ref 0–38.6)

## 2020-04-27 ENCOUNTER — PATIENT MESSAGE (OUTPATIENT)
Dept: FAMILY MEDICINE | Facility: CLINIC | Age: 75
End: 2020-04-27

## 2020-06-05 ENCOUNTER — TELEPHONE (OUTPATIENT)
Dept: FAMILY MEDICINE | Facility: CLINIC | Age: 75
End: 2020-06-05

## 2020-06-05 NOTE — TELEPHONE ENCOUNTER
Patient requesting assistance scheduling an appointment in June for neck pain. States neck pain has been present x's 3 months. Also requesting to reschedule appointments for labs and follow up with Dr. Ford that was canceled in March. Appointments scheduled/rescheduled as requested. Patient agreed to appointment dates and times.           ----- Message from Yordy Lawrence sent at 6/5/2020  1:24 PM CDT -----  Contact: pt  Pt Liliana Hairston    Pt would like to make a appt for sometime in June if possible    Pt stated she has been having some neck pain     Pt can be reached at [phone]

## 2020-06-11 ENCOUNTER — HOSPITAL ENCOUNTER (OUTPATIENT)
Dept: RADIOLOGY | Facility: HOSPITAL | Age: 75
Discharge: HOME | End: 2020-06-11
Attending: NURSE PRACTITIONER
Payer: MEDICARE

## 2020-06-11 ENCOUNTER — OFFICE VISIT (OUTPATIENT)
Dept: FAMILY MEDICINE | Facility: CLINIC | Age: 75
End: 2020-06-11
Payer: MEDICARE

## 2020-06-11 VITALS
HEART RATE: 57 BPM | SYSTOLIC BLOOD PRESSURE: 122 MMHG | BODY MASS INDEX: 24.84 KG/M2 | DIASTOLIC BLOOD PRESSURE: 74 MMHG | TEMPERATURE: 98 F | HEIGHT: 64 IN | WEIGHT: 145.5 LBS | RESPIRATION RATE: 18 BRPM | OXYGEN SATURATION: 96 %

## 2020-06-11 DIAGNOSIS — M54.2 CERVICAL MUSCLE PAIN: ICD-10-CM

## 2020-06-11 DIAGNOSIS — M54.2 CERVICALGIA: Primary | ICD-10-CM

## 2020-06-11 DIAGNOSIS — G44.011 INTRACTABLE EPISODIC CLUSTER HEADACHE: ICD-10-CM

## 2020-06-11 DIAGNOSIS — Z01.419 WELL WOMAN EXAM WITH ROUTINE GYNECOLOGICAL EXAM: ICD-10-CM

## 2020-06-11 PROCEDURE — 99214 OFFICE O/P EST MOD 30 MIN: CPT | Mod: S$PBB,,, | Performed by: NURSE PRACTITIONER

## 2020-06-11 PROCEDURE — 99999 PR PBB SHADOW E&M-EST. PATIENT-LVL V: ICD-10-PCS | Mod: PBBFAC,,, | Performed by: NURSE PRACTITIONER

## 2020-06-11 PROCEDURE — 99215 OFFICE O/P EST HI 40 MIN: CPT | Mod: PBBFAC,25,PO | Performed by: NURSE PRACTITIONER

## 2020-06-11 PROCEDURE — 99999 PR PBB SHADOW E&M-EST. PATIENT-LVL V: CPT | Mod: PBBFAC,,, | Performed by: NURSE PRACTITIONER

## 2020-06-11 PROCEDURE — 72040 X-RAY EXAM NECK SPINE 2-3 VW: CPT | Mod: TC,FY

## 2020-06-11 PROCEDURE — 72040 X-RAY EXAM NECK SPINE 2-3 VW: CPT | Mod: 26,,, | Performed by: RADIOLOGY

## 2020-06-11 PROCEDURE — 99214 PR OFFICE/OUTPT VISIT, EST, LEVL IV, 30-39 MIN: ICD-10-PCS | Mod: S$PBB,,, | Performed by: NURSE PRACTITIONER

## 2020-06-11 PROCEDURE — 72040 XR CERVICAL SPINE AP LATERAL: ICD-10-PCS | Mod: 26,,, | Performed by: RADIOLOGY

## 2020-06-11 NOTE — PROGRESS NOTES
Patient ID: Liliana Hairston is a 74 y.o. female.    Chief Complaint:   Neck Pain (left side )    Neck Pain    This is a new problem. The current episode started more than 1 month ago (Symptoms first noted about 3 months ago. Patient did not want to leave home at that time due to COVID). The problem occurs constantly. The problem has been waxing and waning. The pain is associated with lifting a heavy object (cleaning up house and back yard). The pain is present in the left side. The quality of the pain is described as shooting, stabbing and aching. The pain is mild. The symptoms are aggravated by position (Worse when laying down at night). The pain is worse during the night. Stiffness is present all day (Stiffness is worse in the morning but persist all day). Associated symptoms include headaches. Pertinent negatives include no chest pain, fever, leg pain, pain with swallowing, paresis, photophobia, syncope, trouble swallowing, visual change, weakness or weight loss. Associated symptoms comments: Patient suffers with chronic migraines. She has tried NSAIDs, muscle relaxants and acetaminophen for the symptoms. The treatment provided mild relief.      Patient is new to me. Reviewed past medical and social history.    Past Medical History:   Diagnosis Date    Cancer     breast, right '97    Cataract      Past Surgical History:   Procedure Laterality Date    APPENDECTOMY      BREAST SURGERY      right mastectomy    COLONOSCOPY N/A 2/21/2018    Procedure: COLONOSCOPY;  Surgeon: Cristel Fritz MD;  Location: [facility];  Service: Endoscopy;  Laterality: N/A;    EYE SURGERY      HYSTERECTOMY      LUNG SURGERY  1998    remove cyst on top of right lobe     Current Outpatient Medications on File Prior to Visit   Medication Sig Dispense Refill    anastrozole (ARIMIDEX) 1 mg Tab Take 1 tablet (1 mg total) by mouth once daily. 90 tablet 3    azelastine (ASTELIN) 137 mcg (0.1 %) nasal spray 1 spray (137 mcg total)  by Nasal route 2 (two) times daily. 30 mL 5    CALCIUM CARBONATE/VITAMIN D3 (CALCIUM 500 + D ORAL) Take by mouth.      meloxicam (MOBIC) 15 MG tablet Take 1 tablet (15 mg total) by mouth once daily. 30 tablet 0    METAMUCIL, SUGAR, Powd Take 1 Package by mouth once daily.  0    multivitamin (ONE DAILY MULTIVITAMIN) per tablet Take 1 tablet by mouth once daily.      triamcinolone acetonide 0.1% (KENALOG) 0.1 % cream APPLY CREAM EXTERNALLY TWICE DAILY TO ITCHY RASH  3     No current facility-administered medications on file prior to visit.        Review of Systems   Constitutional: Negative for chills, fever and weight loss.   HENT: Negative for trouble swallowing.    Eyes: Negative for photophobia.   Cardiovascular: Negative for chest pain, palpitations and syncope.   Musculoskeletal: Positive for myalgias, neck pain and neck stiffness. Negative for back pain and gait problem.   Skin: Negative for rash.   Neurological: Positive for headaches. Negative for dizziness, facial asymmetry, weakness and light-headedness.   All other systems reviewed and are negative.      Objective:      Physical Exam   Constitutional: She is oriented to person, place, and time. She appears well-developed and well-nourished.   HENT:   Head: Normocephalic and atraumatic.   Eyes: Conjunctivae are normal.   Neck: Normal range of motion. Muscular tenderness present. No spinous process tenderness present. No neck rigidity. Normal range of motion present.   Cardiovascular: Normal heart sounds. Bradycardia present.   Pulmonary/Chest: Effort normal.   Musculoskeletal: Normal range of motion.   Lymphadenopathy:     She has no cervical adenopathy.   Neurological: She is alert and oriented to person, place, and time.   Skin: Skin is warm and dry.   Psychiatric: She has a normal mood and affect.       Assessment:       1. Cervicalgia    2. Well woman exam with routine gynecological exam    3. Intractable episodic cluster headache        Plan:        Liliana was seen today for neck pain.    Diagnoses and all orders for this visit:    Cervicalgia  -     X-Ray Cervical Spine AP And Lateral; Future  -     Ambulatory referral/consult to Physical/Occupational Therapy; Future    Well woman exam with routine gynecological exam  -     Ambulatory referral/consult to Gynecology; Future    Intractable episodic cluster headache    Patient education provided.  All questions and concerns addressed  RTC PRN and if symptoms worsen or fail to improve  Patient verbalizes understanding        Patient Instructions       Neck Pain    There are several possible causes of neck pain when there is no injury:  · You can get a minor ligament sprain or muscle strain from a sudden minor neck movement. Sleeping with your neck in an awkward position can also cause this.  · Some people respond to emotional stress by tensing the muscles of their neck, shoulders, and upper back. Chronic spasm in these muscles can cause neck pain and sometimes headaches.  · Gradual wear and tear of the joints in the spine can cause degenerative arthritis. This can be a source of occasional or chronic neck pain.  · The spinal disks may bulge and put pressure on a nearby spinal nerve. This can happen as a natural result of aging or repeated small injuries to the neck. The spinal disks are the cushions between each spinal bone. This causes tingling, pain, or numbness that spreads from the neck to the shoulder, arm, or hand on one side.  Acute neck pain usually gets better in 1 to 2 weeks. Neck pain related to disk disease, arthritis in the spinal joints, or spinal stenosis can become chronic and last for months or years. Spinal stenosis is narrowing of the spinal canal.  X-rays are usually not ordered for the initial evaluation of neck pain. However, X-rays may be done if you had a forceful physical injury, such as a car accident or fall. If pain continues and doesnt respond to medical treatment, X-rays and other  tests may be done at a later time.  Home care  · Rest and relax the muscles. Use a comfortable pillow that supports the head. It should also help keep the spine in a neutral position. The position of the head should not be tilted forward or backward. A rolled up towel may help for a custom fit.  · Some people find relief with heat. Heat can be applied with either a warm shower or bath or a moist towel heated in the microwave and massage. Others prefer cold packs. You can make an ice pack by filling a plastic bag that seals at the top with ice cubes or crushed ice and then wrapping it with a thin towel. Try both and use the method that feels best for 15 to 20 minutes, several times a day.  · Whether using ice or heat, be careful that you do not injure your skin. Never put ice directly on the skin. Always wrap the ice in a towel or other type of cloth.This is very important, especially in people with poor skin sensations.   · Try to reduce your stress level. Emotional stress can lead to neck muscle tension and get in the way of or delay the healing process.  · You may use over-the-counter pain medicine to control pain, unless another medicine was prescribed. If you have chronic liver or kidney disease or ever had a stomach ulcer or GI bleeding, talk with your healthcare provider before using these medicines.  Follow-up care  Follow up with your healthcare provider if your symptoms do not show signs of improvement after one week. Physical therapy or further tests may be needed.  If X-rays, CT scans, or MRI scans were taken, you will be told of any new findings that may affect your care.  Call 911  Call 911 if you have:  · Sudden weakness or numbness in one or both arms  · Neck swelling, difficulty or painful swallowing  · Difficulty breathing  · Chest pain  When to seek medical advice  Call your healthcare provider right away if any of these occur:  · Pain becomes worse or spreads into one or both arm  · Increasing  headache  · Fever of 100.4°F (38°C) or above lasting for 24 to 48 hours  Date Last Reviewed: 7/1/2016  © 4800-5326 [employer]. [address] [place] [address]. All rights reserved. This information is not intended as a substitute for professional medical care. Always follow your healthcare professional's instructions.    Shoulder and Upper Back Stretch  To start, stand tall with your ears, shoulders, and hips in line. Your feet should be slightly apart, positioned just under your hips. Focus your eyes directly in front of you.  this position for a few seconds before starting your exercise. This helps increase your awareness of proper posture.          Reach overhead and slightly back with both arms. Keep your shoulders and neck aligned and your elbows behind your shoulders:  · With your palms facing the ceiling, turn your fingers inward.  · Take a deep breath. Breathe out, and lower your elbows toward your buttocks. Hold for 5 seconds, then return to starting position.  · Repeat 3 times.  Date Last Reviewed: 8/16/2015  © 0892-1664 [employer]. [address] [place] [address]. All rights reserved. This information is not intended as a substitute for professional medical care. Always follow your healthcare professional's instructions.

## 2020-06-11 NOTE — PATIENT INSTRUCTIONS
Neck Pain    There are several possible causes of neck pain when there is no injury:  · You can get a minor ligament sprain or muscle strain from a sudden minor neck movement. Sleeping with your neck in an awkward position can also cause this.  · Some people respond to emotional stress by tensing the muscles of their neck, shoulders, and upper back. Chronic spasm in these muscles can cause neck pain and sometimes headaches.  · Gradual wear and tear of the joints in the spine can cause degenerative arthritis. This can be a source of occasional or chronic neck pain.  · The spinal disks may bulge and put pressure on a nearby spinal nerve. This can happen as a natural result of aging or repeated small injuries to the neck. The spinal disks are the cushions between each spinal bone. This causes tingling, pain, or numbness that spreads from the neck to the shoulder, arm, or hand on one side.  Acute neck pain usually gets better in 1 to 2 weeks. Neck pain related to disk disease, arthritis in the spinal joints, or spinal stenosis can become chronic and last for months or years. Spinal stenosis is narrowing of the spinal canal.  X-rays are usually not ordered for the initial evaluation of neck pain. However, X-rays may be done if you had a forceful physical injury, such as a car accident or fall. If pain continues and doesnt respond to medical treatment, X-rays and other tests may be done at a later time.  Home care  · Rest and relax the muscles. Use a comfortable pillow that supports the head. It should also help keep the spine in a neutral position. The position of the head should not be tilted forward or backward. A rolled up towel may help for a custom fit.  · Some people find relief with heat. Heat can be applied with either a warm shower or bath or a moist towel heated in the microwave and massage. Others prefer cold packs. You can make an ice pack by filling a plastic bag that seals at the top with ice cubes or  crushed ice and then wrapping it with a thin towel. Try both and use the method that feels best for 15 to 20 minutes, several times a day.  · Whether using ice or heat, be careful that you do not injure your skin. Never put ice directly on the skin. Always wrap the ice in a towel or other type of cloth.This is very important, especially in people with poor skin sensations.   · Try to reduce your stress level. Emotional stress can lead to neck muscle tension and get in the way of or delay the healing process.  · You may use over-the-counter pain medicine to control pain, unless another medicine was prescribed. If you have chronic liver or kidney disease or ever had a stomach ulcer or GI bleeding, talk with your healthcare provider before using these medicines.  Follow-up care  Follow up with your healthcare provider if your symptoms do not show signs of improvement after one week. Physical therapy or further tests may be needed.  If X-rays, CT scans, or MRI scans were taken, you will be told of any new findings that may affect your care.  Call 911  Call 911 if you have:  · Sudden weakness or numbness in one or both arms  · Neck swelling, difficulty or painful swallowing  · Difficulty breathing  · Chest pain  When to seek medical advice  Call your healthcare provider right away if any of these occur:  · Pain becomes worse or spreads into one or both arm  · Increasing headache  · Fever of 100.4°F (38°C) or above lasting for 24 to 48 hours  Date Last Reviewed: 7/1/2016  © 3569-7489 [employer]. [address] [place] [address], [place] [address]. All rights reserved. This information is not intended as a substitute for professional medical care. Always follow your healthcare professional's instructions.    Shoulder and Upper Back Stretch  To start, stand tall with your ears, shoulders, and hips in line. Your feet should be slightly apart, positioned just under your hips. Focus your eyes directly in front of you.   this position for a few seconds before starting your exercise. This helps increase your awareness of proper posture.          Reach overhead and slightly back with both arms. Keep your shoulders and neck aligned and your elbows behind your shoulders:  · With your palms facing the ceiling, turn your fingers inward.  · Take a deep breath. Breathe out, and lower your elbows toward your buttocks. Hold for 5 seconds, then return to starting position.  · Repeat 3 times.  Date Last Reviewed: 8/16/2015  © 1826-3905 [employer]. [address] [place] [id number]. All rights reserved. This information is not intended as a substitute for professional medical care. Always follow your healthcare professional's instructions.

## 2020-06-15 ENCOUNTER — CLINICAL SUPPORT (OUTPATIENT)
Dept: REHABILITATION | Facility: HOSPITAL | Age: 75
End: 2020-06-15
Payer: MEDICARE

## 2020-06-15 DIAGNOSIS — M54.2 CERVICALGIA: ICD-10-CM

## 2020-06-15 DIAGNOSIS — M53.82 DECREASED RANGE OF MOTION OF INTERVERTEBRAL DISCS OF CERVICAL SPINE: ICD-10-CM

## 2020-06-15 DIAGNOSIS — R52 TENDERNESS: ICD-10-CM

## 2020-06-15 DIAGNOSIS — R53.1 DECREASED STRENGTH: ICD-10-CM

## 2020-06-15 PROCEDURE — 97161 PT EVAL LOW COMPLEX 20 MIN: CPT

## 2020-06-15 NOTE — PLAN OF CARE
[facility]  Physical Therapy Initial Evaluation    Name: Liliana Hairston  Clinic Number: [id number]    Therapy Diagnosis:   Encounter Diagnoses   Name Primary?    Cervicalgia     Decreased range of motion of intervertebral discs of cervical spine     Tenderness     Decreased strength      Physician: Bernice Olsen, PARAG    Physician Orders: PT Eval and Treat  Medical Diagnosis from Referral: cervicalgia   Evaluation Date: 6/15/2020  Authorization Period Expiration: 12/31/2020  Current Certification Period: 6/15/2020 - 8/21/2020   Plan of Care Expiration: 8/21/2020  Visit # / Visits authorized: 1/ 104 (0/14 per POC)    Time In: 905 AM  Time Out: 945 AM  Total Appointment Time (timed & untimed codes): 40 minutes    Precautions: Standard    Subjective   Date of onset: a couple months ago, started around March  History of current condition - Liliana reports: her neck pain started on the R and then now has moved to the L. Pt reports she is having trouble sleeping at night. Pt reports she has tried heating pad as well as ibuprofen without improvements. Pt reports the pain stays about the same. Pt reports more pain in the morning and at night. Pt reports an aching and dull feeling. Pt reports the pain wakes her up in the middle of the night about 2-3x/wk. Pt reports she mostly sleeps on her side and if she is on the back the back it hurts the back of her head. Pt denies history of MVA. Pt reports history of headaches, but has a long history of theses. Pt reports it actually feels a little better when I moved the neck.      Medical History:   Past Medical History:   Diagnosis Date    Cancer     breast, right '97    Cataract        Surgical History:   Liliana Hairston  has a past surgical history that includes Appendectomy; Eye surgery; Hysterectomy; Lung surgery (1998); Breast surgery; and Colonoscopy (N/A, 2/21/2018).    Medications:   Liliana has a current  "medication list which includes the following prescription(s): anastrozole, azelastine, calcium carbonate/vitamin d3, meloxicam, metamucil (sugar), multivitamin, and triamcinolone acetonide 0.1%.    Allergies:   Review of patient's allergies indicates:  No Known Allergies     Imaging, x ray : 6/11/2020: Impression:     1. No radiographically evident acute cervical spine abnormality.  2. Multilevel degenerative changes of the cervical spine, most pronounced at C5-6.    Prior Therapy: received therapy for neck pain- 40 years ago, traction   Social History: two story, but she is mostly downstairs lives with their spouse  Occupation/Hobbies: sew, lately being doing puzzle , yard work   Prior Level of Function: independent   Current Level of Function: able carryout these hobbies and activities inspite of the pain, feels better if she keeps her neck moving     Pain:  Current 5/10, worst 10/10, best 2/10   Location: left neck   Description: Aching and Dull  Aggravating Factors: "tough to turn the neck" worst pain the morning and at night   Easing Factors: heating pad, ibuprofen, pt reports she recently bought a patch for her neck    Pts goals: "get rid of the pain"    Objective     Structural/Postural Inspection: forward head, rounded shoulders, bilateral scapular winging indicating weakness     Palpation for Condition: hypertonicity of B upper traps, levator, scalene and sub occipital muscles     Active Range of Motion (AROM)/Passive Range of Motion (PROM):     Cervical AROM (Degrees) PROM (Degrees) Comments   Flexion 50  Pull    Extension 45     Right Side Bend 35  Pull on L   Left Side Bend 35  Pull on R    Right Rotation 65  Pull on L and R    Left Rotation 65  Pull on L and R     Shoulder ROM: WFL    Apley Scratch:   T2 with flexion and ER bilaterally, spine of scap bilaterally for extension IR     Joint Accessory:     Cervical Mob w/ Endfeel Comments   PA glide hypomobility    Right Side-glide Hypomobility     Left " Side-glide Hypomobility       Thoracic Mob w/ Endfeel Comments   Posterior/Anterior Hypomobility  Tenderness/pain from T3-T7     Manual Muscle Testing:     Cervical Strength Grade Comments   Flexion 4/5    Extension 4+/5    Right Side Bend 4+/5    Left Side Bend 4+/5    Right Rotation 4+/5    Left Rotation 4+/5      RUE Strength Grade LUE Strength Grade   Shoulder Flexion 4+/5 Shoulder Flexion 4+/5   Shoulder Extension 4/5 Shoulder Extension 4/5   Shoulder Abduction 4+/5 Shoulder Abduction 4+/5   Elbow Flexion 5/5 Elbow Flexion 5/5   Elbow Extension 4/5 Elbow Extension 4/5   Wrist Flexion 4+/5 Wrist Flexion 4+/5   Wrist Extension 4+/5 Wrist Extension 4+/5   Middle Trap 4/5 Middle Trap 4/5   Lower Trap 4+/5 Lower Trap 4+/5   Rhomboids 4/5 Rhomboids 4/5   Teres Minor 4/5 Teres Minor 4/5                   Special Tests:    Cervical Results Comments   Distraction Negative. Feels some relief    Compression Negative. Pressure    Spurling's Negative.    Cervical Flexion Rotation Test  NT 2/2 neg for headaches related to neck symptoms .    DNF Endurance Test Positive. 15 seconds      Movement Analysis: slight scapular dyskinesia bilaterally with scaption elevation    Palpation for Tenderness: tenderness to palpation of bilateral upper traps, levator at superior scapular angle, rhomboid and middle trap on L > R, bilateral sub occipitals    Sensation:  Negative for numbness and tingling therefore NT      ULTT Right  Left Comments   Median Positive. Positive. Tingling reported on L    Musculocutaneous, Median, and Axillary Positive. Positive.    Ulnar Negative. Negative.    Radial Negative. Negative.      NDI: 13/50 = 26% disability       TREATMENT     Home Exercises and Patient Education Provided    Education provided:   - Written HEP provided. Pt demonstrate fair to good understanding based on returned demonstration     Written Home Exercises Provided: yes.  Exercises were reviewed and Liliana was able to demonstrate them  prior to the end of the session.  Liliana demonstrated good  understanding of the education provided.     See EMR under Patient Instructions for exercises provided 6/15/2020.    Assessment   Liliana is a 74 y.o. female referred to outpatient Physical Therapy with a medical diagnosis of cervicalgia. Pt presents with c/o of subacute neck pain that started about 3 months ago. Pt did report that she received therapy for her neck about 40 years ago, but new onset started 3 months ago.Pt with greater pain in the morning and at night. Pt reports the pain is more on the L side compared to the R, but examination indicated bilateral neck pain. Pt presents with signs and symptoms consistent with neck pain with mobility deficits. Pt demonstrates decreased cervical AROM especially SB, decreased BUE strength, tenderness to palpation of vivek scapular and cervical musculature, decreased muscle length of cervical musculature, decreased DNF endurance, and presents with hypomobility with joint assessment of cervical and thoracic spine. Pt did report relief with distraction and increased pressure with compression, however, these tests were negative for radicular symptoms. Pt also presents with slight tingling and decreased mobility of median nerve L>R based on ULTT. Pt will benefit from skilled PT services to address the above deficits which will lead to improved quality of sleep which will lead to improved quality of life.    Pt prognosis is Good.   Pt will benefit from skilled outpatient Physical Therapy to address the deficits stated above and in the chart below, provide pt/family education, and to maximize pt's level of independence.     Plan of care discussed with patient: Yes  Pt's spiritual, cultural and educational needs considered and patient is agreeable to the plan of care and goals as stated below:     Anticipated Barriers for therapy: none     Medical Necessity is demonstrated by the following  History  Co-morbidities and  personal factors that may impact the plan of care Co-morbidities:   advanced age    Personal Factors:   no deficits     low   Examination  Body Structures and Functions, activity limitations and participation restrictions that may impact the plan of care Body Regions:   neck    Body Systems:    gross symmetry  ROM  strength  gross coordinated movement    Participation Restrictions:   Able to carryout all ADLs and IADLs as well as hobbies, however reports pain with these activities     Activity limitations:   Learning and applying knowledge  no deficits    General Tasks and Commands  no deficits    Communication  no deficits    Mobility  no deficits    Self care  no deficits    Domestic Life  cooking  doing house work (cleaning house, washing dishes, laundry)    Interactions/Relationships  no deficits    Life Areas  no deficits    Community and Social Life  community life  recreation and leisure         low   Clinical Presentation stable and uncomplicated low   Decision Making/ Complexity Score: low     Goals:    STG  Weeks/Visits Date Established  Goal Status    1.Pt will increase B SB AROM to >/= 42 deg to improve cervical mobility  4 weeks  6/15/2020      2. Pt will report </= 2 incidents of night pain in the past week to improve sleep quality  4 weeks  6/15/2020      3.Pt will report </= 18% disability on NDI to indicate improved participation and quality of life 4 weeks  6/15/2020      4.Pt will report </= 7/10 for at worst pain to improve quality of life  4 weeks  6/15/2020      5.Pt will demonstrate independence and compliance with HEP to improve therapy outcomes.  4 weeks  6/15/2020        LTG Weeks/Visits Date Established  Goal Status    1. Pt will increase B cervical rotation to >/= 70 deg to improve ability to look over shoulder during driving  8 weeks  6/15/2020      2. Pt will report no incidents of night pain in the past week to improve sleep quality 8 weeks 6/15/2020        3.Pt will report </= 12%  disability on NDI to indicate improved participation and quality of life 8 weeks 6/15/2020      4.Pt will report </= 3/10 for at worst pain to improve quality of life  8 weeks 6/15/2020      5. Pt will increase BUE strength to >/= 4+/5 to improve ability to lift objects overhead  8 weeks 6/15/2020          Plan   Plan of care Certification: 6/15/2020 to 8/21/2020.    Outpatient Physical Therapy 2 times weekly for 6 weeks followed by 1x/wk for 2 weeks to include the following interventions: Cervical/Lumbar Traction, Electrical Stimulation TENS, Manual Therapy, Moist Heat/ Ice, Neuromuscular Re-ed, Patient Education, Therapeutic Activites, Therapeutic Exercise and Ultrasound. Pt may be seen by PTA as part of the rehabilitation team.    Rosenda Carranza, PT

## 2020-06-16 NOTE — PROGRESS NOTES
"                               [facility]  Physical Therapy Daily Treatment Note     Name: Liliana Hairston  Clinic Number: [id number]    Therapy Diagnosis:   Encounter Diagnoses   Name Primary?    Decreased range of motion of intervertebral discs of cervical spine Yes    Tenderness     Decreased strength      Physician: Bernice Olsen, DNP    Visit Date: 6/18/2020    Physician Orders: PT Eval and Treat  Medical Diagnosis from Referral: cervicalgia   Evaluation Date: 6/15/2020  Authorization Period Expiration: 12/31/2020  Current Certification Period: 6/15/2020 - 8/21/2020   Plan of Care Expiration: 8/21/2020  Visit # / Visits authorized: 2/ 104 (1/14 per POC)      Time In: 7:55 am  Time Out: 8:50 am  Total Billable Time: 42 minutes    Precautions: Standard    Subjective     Pt reports: "The pain that I have had in the past 24 hours was more on the left side and only at a 3/10." PT acknowledges.    She was compliant with home exercise program.  Response to previous treatment: No adverse reactions.  Functional change: This is the patient's first visit since her initial evaluation.    Pain: 3/10  Location: bilateral neck and upper traps      Objective     Liliana received therapeutic exercises to develop strength, endurance, ROM, flexibility and posture for 30 minutes including:    -+Pulley's x 4 minutes in scaption direction  -+UBE x 4 minutes  -+T-band one arm pull downs (doing both UEs separately) using GTB x 20 reps each  -+T-band UE flexion (each UE separately) with PTB x 20 reps each  -+Cybex scapular retractions with 2 plates x 20 reps  -+isometric cervical RSB and LSB with 3 second holds x 5 reps each  -Upper Trap stretch at 30 seconds x 2 bilaterally  -Cervical chin tuck and retractions while in sitting    Exercises below not performed due to out of time:  -Levator Scap stretch at 30 seconds x 3  -Cervical chin tuck and retractions while supine  -Scapular retractions without " resistance  -Suboccipital release with balls x 3 minutes    Liliana received the following manual therapy techniques: Manual traction and Soft tissue Mobilization were applied to the: 0 for 0 minutes, includin    Liliana participated in neuromuscular re-education activities to improve: Posture for 0 minutes. The following activities were included:  0    Liliana participated in dynamic functional therapeutic activities to improve functional performance for 0  minutes, includin  Liliana received the following direct contact modalities after being cleared for contraindications: Ultrasound:  Liliana received ultrasound to manage pain and inflammation at 0 % duty cycle applied to the 0 at an intensity of  number entry box W/cm2  for a duration of 0 minutes. Patient tolerated treatment well without adverse effects. Therapist was in attendance throughout intervention.    Liliana received the following supervised modalities after being cleared for contradictions: Mechanical Traction:  Liliana received intermittent mechanical traction to the cervical spine at a force of 20 pounds(to comfort) for a total of 12 minutes. Hold time of 1/2 minute and rest time for 10  seconds. Patient tolerated treatment well without any adverse effects.    Liliana received hot pack for 0 minutes to 0.    Liliana received cold pack for 0 minutes to 0.      Home Exercises Provided and Patient Education Provided     Education provided:   - Patient educated on keeping exercises at home comfortable and not to cause increased pain when performing. The patient was verbally compliant and was without questions. Patient also educated on how to secure the balls used for her occipital release better and was verbally compliant.    Written Home Exercises Provided: Patient instructed to cont prior HEP.  Exercises were reviewed and Liliana was able to demonstrate them prior to the end of the session.  Liliana demonstrated good  understanding of the education provided.      See EMR under Media for exercises provided 06/15/2020.    Assessment     The patient is a 74 year old female. Today, she continues with complaints of neck pain mainly on the left side in the upper trap area. Cervical AROM does not appear limited this day, but decreased strength does remain. Left sided weakness more apparent then right side. Hopeful that cervical traction will assist with decreasing the remaining complaints of pain and continued strengthening will improve cervical stability and help provide the patient with long term relief. The patient has good potential to reach her LTGs.      Liliana is progressing well towards her goals.   Pt prognosis is Good.     Pt will continue to benefit from skilled outpatient physical therapy to address the deficits listed in the problem list box on initial evaluation, provide pt/family education and to maximize pt's level of independence in the home and community environment.     Pt's spiritual, cultural and educational needs considered and pt agreeable to plan of care and goals.     Anticipated barriers to physical therapy: none    Goals:     STG  Weeks/Visits Date Established  Goal Status    1.Pt will increase B SB AROM to >/= 42 deg to improve cervical mobility  4 weeks  6/15/2020    In Progress   2. Pt will report </= 2 incidents of night pain in the past week to improve sleep quality  4 weeks  6/15/2020    In Progress    3.Pt will report </= 18% disability on NDI to indicate improved participation and quality of life 4 weeks  6/15/2020    In Progress    4.Pt will report </= 7/10 for at worst pain to improve quality of life  4 weeks  6/15/2020    In Progress    5.Pt will demonstrate independence and compliance with HEP to improve therapy outcomes.  4 weeks  6/15/2020    In Progress       LTG Weeks/Visits Date Established  Goal Status    1. Pt will increase B cervical rotation to >/= 70 deg to improve ability to look over shoulder during driving  8 weeks  6/15/2020         2. Pt will report no incidents of night pain in the past week to improve sleep quality 8 weeks 6/15/2020           3.Pt will report </= 12% disability on NDI to indicate improved participation and quality of life 8 weeks 6/15/2020        4.Pt will report </= 3/10 for at worst pain to improve quality of life  8 weeks 6/15/2020        5. Pt will increase BUE strength to >/= 4+/5 to improve ability to lift objects overhead  8 weeks 6/15/2020        Plan     Plan of care Certification: 6/15/2020 to 8/21/2020. PT will continue to progress as the patient tolerates.     Outpatient Physical Therapy 2 times weekly for 6 weeks followed by 1x/wk for 2 weeks to include the following interventions: Cervical/Lumbar Traction, Electrical Stimulation TENS, Manual Therapy, Moist Heat/ Ice, Neuromuscular Re-ed, Patient Education, Therapeutic Activites, Therapeutic Exercise and Ultrasound. Pt may be seen by PTA as part of the rehabilitation team.         Chris Urban, PT

## 2020-06-18 ENCOUNTER — CLINICAL SUPPORT (OUTPATIENT)
Dept: REHABILITATION | Facility: HOSPITAL | Age: 75
End: 2020-06-18
Payer: MEDICARE

## 2020-06-18 DIAGNOSIS — M53.82 DECREASED RANGE OF MOTION OF INTERVERTEBRAL DISCS OF CERVICAL SPINE: Primary | ICD-10-CM

## 2020-06-18 DIAGNOSIS — R52 TENDERNESS: ICD-10-CM

## 2020-06-18 DIAGNOSIS — R53.1 DECREASED STRENGTH: ICD-10-CM

## 2020-06-18 PROCEDURE — 97012 MECHANICAL TRACTION THERAPY: CPT

## 2020-06-18 PROCEDURE — 97110 THERAPEUTIC EXERCISES: CPT

## 2020-06-23 ENCOUNTER — CLINICAL SUPPORT (OUTPATIENT)
Dept: REHABILITATION | Facility: HOSPITAL | Age: 75
End: 2020-06-23
Payer: MEDICARE

## 2020-06-23 DIAGNOSIS — R53.1 DECREASED STRENGTH: ICD-10-CM

## 2020-06-23 DIAGNOSIS — M53.82 DECREASED RANGE OF MOTION OF INTERVERTEBRAL DISCS OF CERVICAL SPINE: ICD-10-CM

## 2020-06-23 DIAGNOSIS — R52 TENDERNESS: ICD-10-CM

## 2020-06-23 PROCEDURE — 97014 ELECTRIC STIMULATION THERAPY: CPT | Mod: CQ

## 2020-06-23 PROCEDURE — 97140 MANUAL THERAPY 1/> REGIONS: CPT | Mod: CQ

## 2020-06-23 PROCEDURE — 97110 THERAPEUTIC EXERCISES: CPT | Mod: CQ

## 2020-06-23 NOTE — PROGRESS NOTES
[facility] OUTPATIENT  Physical Therapy Daily Treatment Note     Name: Liliana Hairston  Clinic Number: [id number]    Therapy Diagnosis:   Encounter Diagnoses   Name Primary?    Decreased range of motion of intervertebral discs of cervical spine     Tenderness     Decreased strength      Physician: Bernice Olsen, PARAG    Visit Date: 6/23/2020    Physician Orders: PT Eval and Treat  Medical Diagnosis from Referral: cervicalgia   Evaluation Date: 6/15/2020  Authorization Period Expiration: 12/31/2020  Current Certification Period: 6/15/2020 - 8/21/2020   Plan of Care Expiration: 8/21/2020  Visit # / Visits authorized: 3/ 104 (2/14 per POC)      Time In: 0945 am  Time Out: 1045 am  Total Billable Time: 58 minutes    Precautions: Standard    Subjective     Pt reports: Increased soreness after last visit. Post manual and there-ex she verbalized an increase in pain therefore performed heat and stim.     She was compliant with home exercise program.  Response to previous treatment: Increased soreness  Functional change: N/A    Pain: 4/10  Location: bilateral neck and upper traps      Objective     Liliana received therapeutic exercises to develop strength, endurance, ROM, flexibility and posture for 38 minutes including:      -UBE x 4 minutes  +Standing scapular retractions without UT compensations 5 sec holds x 20 reps  -Cybex scapular retractions with 2 plates x 20 reps  -Upper Trap stretch at 30 seconds x 2 bilaterally  levator scap stretch 3 x 30 sec  sub occipital stretch with towel 3 x 30 sec  -Cervical chin tuck in supine 5 sec holds x 15 reps  -Cervical chin tuck with flexion 5 sec holds 2 x 5 reps  +isometric cervical R rotation and L rotation with 5 second holds x 10 reps each  sub occipital release with balls performing 20 nods: CW, CCW, yes, and no    Not performed:  Jesus's x 4 minutes in scaption direction  -T-band one arm pull downs (doing both UEs separately)  using GTB x 20 reps each  -T-band UE flexion (each UE separately) with PTB x 20 reps each    Liliana received the following manual therapy techniques: Manual traction and Soft tissue Mobilization were applied to the: C spine for 10 minutes, including:  Sub occipitals   Levator scap  UT      Liliana participated in neuromuscular re-education activities to improve: Posture for 0 minutes. The following activities were included:  0    Liliana participated in dynamic functional therapeutic activities to improve functional performance for 0  minutes, includin  Liliana received the following direct contact modalities after being cleared for contraindications: Ultrasound:  Liliana received ultrasound to manage pain and inflammation at 0 % duty cycle applied to the 0 at an intensity of  number entry box W/cm2  for a duration of 0 minutes. Patient tolerated treatment well without adverse effects. Therapist was in attendance throughout intervention.    Liliana received the following supervised modalities after being cleared for contradictions: Mechanical Traction:  Liliana received intermittent mechanical traction to the cervical spine at a force of 20 pounds(to comfort) for a total of 12 minutes. Hold time of 1/2 minute and rest time for 10  seconds. Patient tolerated treatment well without any adverse effects. (NP)    Lilinaa received the following supervised modalities after being cleared for contradictions: IFC:  Mirella received IFC electrical stimulation for pain to the C spine region for 10 minutes. Liliana tolerated treatment well without any adverse effects.       Liliana received hot pack for 10 minutes to C-spine with IFC.     Liliana received cold pack for 0 minutes to 0.      Home Exercises Provided and Patient Education Provided     Education provided:   - Patient educated on keeping exercises at home comfortable and not to cause increased pain when performing. The patient was verbally compliant and was without questions. Patient  also educated on how to secure the balls used for her occipital release better and was verbally compliant.    Written Home Exercises Provided: Patient instructed to cont prior HEP.  Exercises were reviewed and Liliana was able to demonstrate them prior to the end of the session.  Liliana demonstrated good  understanding of the education provided.     See EMR under Media for exercises provided 06/15/2020.    Assessment     Pt tolerated there-ex well. She noted to be TTP in the areas listed above. She responded fairly well as mild tissue softening was achieved evident by improved tolerance to sub occipital release with the balls. During scapular retraction she req's tc's to improve posture to prevent UT compensation. Good response to cueing as improvement was noted. Pt will continue to benefit from skilled PT to improve periscapular strength, reduce UT compensation, and decrease tension in cervical musculature to allow pt to return to pain free independence with ADL's.     Liliana is progressing well towards her goals.   Pt prognosis is Good.     Pt will continue to benefit from skilled outpatient physical therapy to address the deficits listed in the problem list box on initial evaluation, provide pt/family education and to maximize pt's level of independence in the home and community environment.     Pt's spiritual, cultural and educational needs considered and pt agreeable to plan of care and goals.     Anticipated barriers to physical therapy: none    Goals:     STG  Weeks/Visits Date Established  Goal Status    1.Pt will increase B SB AROM to >/= 42 deg to improve cervical mobility  4 weeks  6/15/2020    In Progress   2. Pt will report </= 2 incidents of night pain in the past week to improve sleep quality  4 weeks  6/15/2020    In Progress    3.Pt will report </= 18% disability on NDI to indicate improved participation and quality of life 4 weeks  6/15/2020    In Progress    4.Pt will report </= 7/10 for at worst pain  to improve quality of life  4 weeks  6/15/2020    In Progress    5.Pt will demonstrate independence and compliance with HEP to improve therapy outcomes.  4 weeks  6/15/2020    In Progress       LTG Weeks/Visits Date Established  Goal Status    1. Pt will increase B cervical rotation to >/= 70 deg to improve ability to look over shoulder during driving  8 weeks  6/15/2020        2. Pt will report no incidents of night pain in the past week to improve sleep quality 8 weeks 6/15/2020           3.Pt will report </= 12% disability on NDI to indicate improved participation and quality of life 8 weeks 6/15/2020        4.Pt will report </= 3/10 for at worst pain to improve quality of life  8 weeks 6/15/2020        5. Pt will increase BUE strength to >/= 4+/5 to improve ability to lift objects overhead  8 weeks 6/15/2020        Plan     Progress periscapular strengthening if appropriate and resume cervical traction.     Plan of care Certification: 6/15/2020 to 8/21/2020. PT will continue to progress as the patient tolerates.     Outpatient Physical Therapy 2 times weekly for 6 weeks followed by 1x/wk for 2 weeks to include the following interventions: Cervical/Lumbar Traction, Electrical Stimulation TENS, Manual Therapy, Moist Heat/ Ice, Neuromuscular Re-ed, Patient Education, Therapeutic Activites, Therapeutic Exercise and Ultrasound. Pt may be seen by PTA as part of the rehabilitation team.         Nicolette Fonseca PTA

## 2020-06-23 NOTE — PROGRESS NOTES
"                               [facility]  Physical Therapy Daily Treatment Note     Name: Liliana Hairston  Clinic Number: [id number]    Therapy Diagnosis:   No diagnosis found.  Physician: Bernice Olsen, PARAG    Visit Date: 6/25/2020    Physician Orders: PT Eval and Treat  Medical Diagnosis from Referral: cervicalgia   Evaluation Date: 6/15/2020  Authorization Period Expiration: 12/31/2020  Current Certification Period: 6/15/2020 - 8/21/2020   Plan of Care Expiration: 8/21/2020  Visit # / Visits authorized: 4/ 104 (3/14 per POC)      Time In: 9:35 am  Time Out: 10:30 am  Total Billable Time: 45 minutes    Precautions: Standard    Subjective     Pt reports: "I felt OK after the last session. I felt better through the afternoon, but by night time the pain returned."     She was compliant with home exercise program.  Response to previous treatment: Increased soreness  Functional change: Patient reporting periods of decreased pain during functional activities at home.    Pain: 4/10  Location: bilateral neck and upper traps      Objective     Liliana received therapeutic exercises to develop strength, endurance, ROM, flexibility and posture for 35 minutes including:      -UBE x 5 minutes alternating fwd and back  -Standing scapular retractions without UT compensations 3 sec holds x 20 reps with pink TB resistance  -Cybex scapular retractions with 2 plates x 30 reps  -Upper Trap stretch at 30 seconds x 2 bilaterally  -levator scap stretch 3 x 30 sec  -Cervical chin tuck in supine 5 sec holds x 15 reps  -Cervical chin tuck with flexion 5 sec holds 2 x 5 reps  -sub occipital release with balls performing 10 nods:CW, CCW, yes, and no(painful towards the end. Would do only pain free directions or NP this next time)  -+cervical flexion (head off of pillow) and isometric extension for 1-2 second hold into the pillow x 15 each  +supine wand flexion with 2 pound weight attached x 15 reps    Not " performed:  -+isometric cervical R rotation and L rotation with 5 second holds x 10 reps each  -sub occipital stretch with towel 3 x 30 sec  -Pulley's x 4 minutes in scaption direction  -T-band one arm pull downs (doing both UEs separately) using GTB x 20 reps each  -T-band UE flexion (each UE separately) with PTB x 20 reps each    Liliana received the following manual therapy techniques: Manual traction and Soft tissue Mobilization were applied to the: C spine for 0 minutes, including:  Sub occipitals   Levator scap  UT    Liliana participated in neuromuscular re-education activities to improve: Posture for 0 minutes. The following activities were included:  0    Liliana participated in dynamic functional therapeutic activities to improve functional performance for 0  minutes, includin  Liliana received the following direct contact modalities after being cleared for contraindications: Ultrasound:  Liliana received ultrasound to manage pain and inflammation at 0 % duty cycle applied to the 0 at an intensity of  number entry box W/cm2  for a duration of 0 minutes. Patient tolerated treatment well without adverse effects. Therapist was in attendance throughout intervention.    Liliana received the following supervised modalities after being cleared for contradictions: Mechanical Traction:  Liliana received intermittent mechanical traction to the cervical spine at a force of 20 pounds(to comfort) for a total of  minutes. Hold time of 1/2 minute and rest time for 10  seconds. Patient tolerated treatment well without any adverse effects. (NP was unavailable this day. Will use next PT session if available)    Liliana received the following supervised modalities after being cleared for contradictions: IFC:  Liliana received IFC electrical stimulation  To tolerance for pain to the C spine and upper trap region for 15 minutes. Liliana tolerated treatment well without any adverse effects.       Liliana received hot pack for 15 minutes to  C-spine with IFC.     Liliana received cold pack for 0 minutes to 0.      Home Exercises Provided and Patient Education Provided     Education provided:   - Patient educated on keeping exercises at home comfortable including occipital exercise with balls and if comfort could not be maintained then to stop the exercise.The patient was verbally compliant and was without questions. Patient issued pink thera-band to use for her HEP.    Written Home Exercises Provided: yes.  Exercises were reviewed and Liliana was able to demonstrate them prior to the end of the session.  Liliana demonstrated good  understanding of the education provided.     See EMR under Patient Instructions for exercises provided 06/25/2020.    Assessment     Pt tolerated there-ex well. She appeared this day with more tone in the left upper trap vs the right as noted during stretches. There did not appear to be any cervical or shoulder ROM restrictions. She responded fairly well as mild tissue softening was achieved by the end of the session. Patient needs increased cervical muscle endurance as fatigue was noted and reported by the patient as she came to the end of her exercise session. Increased strength and endurance will hep improve stability and hopefully increase times of less pain being reported. Pt will continue to benefit from skilled PT to improve periscapular strength, reduce UT compensation, and decrease tension in cervical musculature to allow pt to return to pain free independence with ADL's.     Liliana is progressing well towards her goals.   Pt prognosis is Good.     Pt will continue to benefit from skilled outpatient physical therapy to address the deficits listed in the problem list box on initial evaluation, provide pt/family education and to maximize pt's level of independence in the home and community environment.     Pt's spiritual, cultural and educational needs considered and pt agreeable to plan of care and goals.     Anticipated  barriers to physical therapy: none    Goals:     STG  Weeks/Visits Date Established  Goal Status    1.Pt will increase B SB AROM to >/= 42 deg to improve cervical mobility  4 weeks  6/15/2020    In Progress   2. Pt will report </= 2 incidents of night pain in the past week to improve sleep quality  4 weeks  6/15/2020    In Progress    3.Pt will report </= 18% disability on NDI to indicate improved participation and quality of life 4 weeks  6/15/2020    In Progress    4.Pt will report </= 7/10 for at worst pain to improve quality of life  4 weeks  6/15/2020    In Progress    5.Pt will demonstrate independence and compliance with HEP to improve therapy outcomes.  4 weeks  6/15/2020    In Progress       LTG Weeks/Visits Date Established  Goal Status    1. Pt will increase B cervical rotation to >/= 70 deg to improve ability to look over shoulder during driving  8 weeks  6/15/2020        2. Pt will report no incidents of night pain in the past week to improve sleep quality 8 weeks 6/15/2020           3.Pt will report </= 12% disability on NDI to indicate improved participation and quality of life 8 weeks 6/15/2020        4.Pt will report </= 3/10 for at worst pain to improve quality of life  8 weeks 6/15/2020        5. Pt will increase BUE strength to >/= 4+/5 to improve ability to lift objects overhead  8 weeks 6/15/2020        Plan     Progress periscapular strengthening if appropriate and resume cervical traction if traction machine is available and if not will use appropriate modality.     Plan of care Certification: 6/15/2020 to 8/21/2020. PT will continue to progress as the patient tolerates.     Outpatient Physical Therapy 2 times weekly for 6 weeks followed by 1x/wk for 2 weeks to include the following interventions: Cervical/Lumbar Traction, Electrical Stimulation TENS, Manual Therapy, Moist Heat/ Ice, Neuromuscular Re-ed, Patient Education, Therapeutic Activites, Therapeutic Exercise and Ultrasound. Pt may be  seen by PTA as part of the rehabilitation team.         Chris Urban, PT

## 2020-06-25 ENCOUNTER — CLINICAL SUPPORT (OUTPATIENT)
Dept: REHABILITATION | Facility: HOSPITAL | Age: 75
End: 2020-06-25
Payer: MEDICARE

## 2020-06-25 DIAGNOSIS — R53.1 DECREASED STRENGTH: ICD-10-CM

## 2020-06-25 DIAGNOSIS — M53.82 DECREASED RANGE OF MOTION OF INTERVERTEBRAL DISCS OF CERVICAL SPINE: Primary | ICD-10-CM

## 2020-06-25 DIAGNOSIS — R52 TENDERNESS: ICD-10-CM

## 2020-06-25 PROCEDURE — 97014 ELECTRIC STIMULATION THERAPY: CPT

## 2020-06-25 PROCEDURE — 97110 THERAPEUTIC EXERCISES: CPT

## 2020-06-30 ENCOUNTER — CLINICAL SUPPORT (OUTPATIENT)
Dept: REHABILITATION | Facility: HOSPITAL | Age: 75
End: 2020-06-30
Payer: MEDICARE

## 2020-06-30 DIAGNOSIS — R53.1 DECREASED STRENGTH: ICD-10-CM

## 2020-06-30 DIAGNOSIS — M53.82 DECREASED RANGE OF MOTION OF INTERVERTEBRAL DISCS OF CERVICAL SPINE: ICD-10-CM

## 2020-06-30 DIAGNOSIS — R52 TENDERNESS: ICD-10-CM

## 2020-06-30 PROCEDURE — 97140 MANUAL THERAPY 1/> REGIONS: CPT | Mod: CQ,59

## 2020-06-30 PROCEDURE — 97110 THERAPEUTIC EXERCISES: CPT | Mod: CQ

## 2020-06-30 PROCEDURE — 97012 MECHANICAL TRACTION THERAPY: CPT | Mod: CQ

## 2020-06-30 NOTE — PROGRESS NOTES
"                               [facility] OUTPATIENT  Physical Therapy Daily Treatment Note     Name: Liliana Hairston  Clinic Number: [id number]    Therapy Diagnosis:   Encounter Diagnoses   Name Primary?    Decreased range of motion of intervertebral discs of cervical spine     Tenderness     Decreased strength      Physician: Bernice Olsen, DNP    Visit Date: 6/30/2020    Physician Orders: PT Eval and Treat  Medical Diagnosis from Referral: cervicalgia   Evaluation Date: 6/15/2020  Authorization Period Expiration: 12/31/2020  Current Certification Period: 6/15/2020 - 8/21/2020   Plan of Care Expiration: 8/21/2020  Visit # / Visits authorized: 5/ 104 (4/14 per POC)      Time In: 9:45 am  Time Out: 10:30 am  Total Billable Time: 45 minutes    Precautions: Standard    Subjective     Pt reports: "It still hurts." Post traction pt stated, "I don't like the way it makes my head feel."     She was compliant with home exercise program.  Response to previous treatment: Increased soreness  Functional change: Patient reporting periods of decreased pain during functional activities at home.    Pain: 4/10  Location: bilateral neck and upper traps      Objective     Liliana received therapeutic exercises to develop strength, endurance, ROM, flexibility and posture for 20 minutes including:      -UBE x 5 minutes alternating fwd and back  -Standing scapular retractions without UT compensations 3 sec holds x 20 reps with pink TB resistance  -Cybex scapular retractions with 2 plates x 30 reps  -Upper Trap stretch at 30 seconds x 2 bilaterally  -levator scap stretch 3 x 30 sec  -Cervical chin tuck in supine 5 sec holds x 15 reps  -Cervical chin tuck with flexion 5 sec holds 2 x 5 reps  -sub occipital release with balls performing 10 nods:CW, CCW, yes, and no(painful towards the end. Would do only pain free directions or NP this next time)  -cervical flexion (head off of pillow) and isometric extension for 1-2 " second hold into the pillow x 15 each  supine wand flexion with 2 pound weight attached x 15 reps  +Supine SA punches 2 x 15 reps with wand and 2# weight     Not performed:  -+isometric cervical R rotation and L rotation with 5 second holds x 10 reps each  -sub occipital stretch with towel 3 x 30 sec  -Pulley's x 4 minutes in scaption direction  -T-band one arm pull downs (doing both UEs separately) using GTB x 20 reps each  -T-band UE flexion (each UE separately) with PTB x 20 reps each    Liliana received the following manual therapy techniques: Manual traction and Soft tissue Mobilization were applied to the: C spine for 10 minutes, including:  Sub occipitals   Levator scap  UT    Liliana participated in neuromuscular re-education activities to improve: Posture for 0 minutes. The following activities were included:  0    Liliana participated in dynamic functional therapeutic activities to improve functional performance for 0  minutes, includin  Liliana received the following direct contact modalities after being cleared for contraindications: Ultrasound:  Liliana received ultrasound to manage pain and inflammation at 0 % duty cycle applied to the 0 at an intensity of  number entry box W/cm2  for a duration of 0 minutes. Patient tolerated treatment well without adverse effects. Therapist was in attendance throughout intervention.    Liliana received the following supervised modalities after being cleared for contradictions: Mechanical Traction:  Liliana received intermittent mechanical traction to the cervical spine at a force of 20 pounds(to comfort) for 30 sec holds and 10lbs for 15 seconds for a total of 15 minutes.  Patient tolerated treatment well without any adverse effects.    Liliana received the following supervised modalities after being cleared for contradictions: IFC:  Liliana received IFC electrical stimulation  To tolerance for pain to the C spine and upper trap region for 15 minutes. Liliana tolerated treatment  well without any adverse effects. (NP)      Liliana received hot pack for 0 minutes to C-spine with IFC.     Liliana received cold pack for 0 minutes to 0.      Home Exercises Provided and Patient Education Provided     Education provided:   - Patient educated on keeping exercises at home comfortable including occipital exercise with balls and if comfort could not be maintained then to stop the exercise.The patient was verbally compliant and was without questions. Patient issued pink thera-band to use for her HEP.    Written Home Exercises Provided: yes.  Exercises were reviewed and Liliana was able to demonstrate them prior to the end of the session.  Liliana demonstrated good  understanding of the education provided.     See EMR under Patient Instructions for exercises provided 06/25/2020.    Assessment     During STM, pt noted to be very TTP in B sub occipital regions. Right side worse than left. Pt responded well to STM as mild tissue softening was noted and pt verbalized reduce tenderness upon palpation. She was able to tolerate additional there-ex as indicated above well without provocation of pain. Post traction pt verbalized increased stiffness. Pt will continue to benefit from skilled PT to improve cervical strength to reduce symptomology and allow pt to return to pain free independence with PLOF.     Liliana is progressing well towards her goals.   Pt prognosis is Good.     Pt will continue to benefit from skilled outpatient physical therapy to address the deficits listed in the problem list box on initial evaluation, provide pt/family education and to maximize pt's level of independence in the home and community environment.     Pt's spiritual, cultural and educational needs considered and pt agreeable to plan of care and goals.     Anticipated barriers to physical therapy: none    Goals:     STG  Weeks/Visits Date Established  Goal Status    1.Pt will increase B SB AROM to >/= 42 deg to improve cervical mobility   4 weeks  6/15/2020    In Progress   2. Pt will report </= 2 incidents of night pain in the past week to improve sleep quality  4 weeks  6/15/2020    In Progress    3.Pt will report </= 18% disability on NDI to indicate improved participation and quality of life 4 weeks  6/15/2020    In Progress    4.Pt will report </= 7/10 for at worst pain to improve quality of life  4 weeks  6/15/2020    In Progress    5.Pt will demonstrate independence and compliance with HEP to improve therapy outcomes.  4 weeks  6/15/2020    In Progress       LTG Weeks/Visits Date Established  Goal Status    1. Pt will increase B cervical rotation to >/= 70 deg to improve ability to look over shoulder during driving  8 weeks  6/15/2020        2. Pt will report no incidents of night pain in the past week to improve sleep quality 8 weeks 6/15/2020           3.Pt will report </= 12% disability on NDI to indicate improved participation and quality of life 8 weeks 6/15/2020        4.Pt will report </= 3/10 for at worst pain to improve quality of life  8 weeks 6/15/2020        5. Pt will increase BUE strength to >/= 4+/5 to improve ability to lift objects overhead  8 weeks 6/15/2020        Plan     Progress periscapular strengthening if appropriate. Follow up with outcome from traction.     Plan of care Certification: 6/15/2020 to 8/21/2020. PT will continue to progress as the patient tolerates.     Outpatient Physical Therapy 2 times weekly for 6 weeks followed by 1x/wk for 2 weeks to include the following interventions: Cervical/Lumbar Traction, Electrical Stimulation TENS, Manual Therapy, Moist Heat/ Ice, Neuromuscular Re-ed, Patient Education, Therapeutic Activites, Therapeutic Exercise and Ultrasound. Pt may be seen by PTA as part of the rehabilitation team.         Nicolette Fonseca PTA

## 2020-07-01 NOTE — PROGRESS NOTES
"                               [facility] OUTPATIENT  Physical Therapy Daily Treatment Note     Name: Liliana Hairston  Clinic Number: [id number]    Therapy Diagnosis:   No diagnosis found.  Physician: Bernice Olsen, PARAG    Visit Date: 7/2/2020    Physician Orders: PT Eval and Treat  Medical Diagnosis from Referral: cervicalgia   Evaluation Date: 6/15/2020  Authorization Period Expiration: 12/31/2020  Current Certification Period: 6/15/2020 - 8/21/2020   Plan of Care Expiration: 8/21/2020  Visit # / Visits authorized: 6/ 104 (5/14 per POC)      Time In: 9:37 am  Time Out: 10:30 am  Total Billable Time: 40 minutes    Precautions: Standard    Subjective     Pt reports: "I actually feel a little better today. I was sore when I left here last time, but that night and through yesterday it felt better. I'd say that I am a 3/10." PT acknowledges.     She was compliant with home exercise program.  Response to previous treatment: A decrease in original pain was reported.  Functional change: Patient continue to report periods of decreased pain during functional activities at home.    Pain: 3/10  Location: bilateral neck and upper traps      Objective     Liliana received therapeutic exercises to develop strength, endurance, ROM, flexibility and posture for 25 minutes including:      -UBE x 6 minutes alternating fwd and back  -Cybex scapular retractions with 2 plates x 30 reps  -+Bruggers with OTB 2 x 10 reps  -+Horizontal abduction with OTB x 20 reps  -+UE add with big red ball x 20 reps  +Isometric big red ball rolling with BUEs on mat at 1 minute/rep of fwd/back, left to right, CW and CCW (4 minutes total)  -cervical flexion (head off of pillow) and isometric extension for 1-2 second hold into the pillow x 15 each    Not performed:  -Standing scapular retractions without UT compensations 3 sec holds x 20 reps with pink TB resistance  -Upper Trap stretch at 30 seconds x 2 bilaterally  -levator scap stretch 3 x " 30 sec  -Cervical chin tuck in supine 5 sec holds x 15 reps  -Cervical chin tuck with flexion 5 sec holds 2 x 5 reps  -sub occipital release with balls performing 10 nods:CW, CCW, yes, and no(painful towards the end. Would do only pain free directions or NP this next time)  -cervical flexion (head off of pillow) and isometric extension for 1-2 second hold into the pillow x 15 each  supine wand flexion with 2 pound weight attached x 15 reps  Supine SA punches 2 x 15 reps with wand and 2# weight   -+isometric cervical R rotation and L rotation with 5 second holds x 10 reps each  -sub occipital stretch with towel 3 x 30 sec  -Pulley's x 4 minutes in scaption direction  -T-band one arm pull downs (doing both UEs separately) using GTB x 20 reps each  -T-band UE flexion (each UE separately) with PTB x 20 reps each    Liliana received the following manual therapy techniques: Manual traction and Soft tissue Mobilization were applied to the: C spine for 0 minutes, including:  Sub occipitals   Levator scap  UT    Liliana participated in neuromuscular re-education activities to improve: Posture for 0 minutes. The following activities were included:  0    Liliana participated in dynamic functional therapeutic activities to improve functional performance for 0  minutes, includin  Liliana received the following direct contact modalities after being cleared for contraindications: Ultrasound:  Liliana received ultrasound to manage pain and inflammation at 0 % duty cycle applied to the 0 at an intensity of  number entry box W/cm2  for a duration of 0 minutes. Patient tolerated treatment well without adverse effects. Therapist was in attendance throughout intervention.    Liliana received the following supervised modalities after being cleared for contradictions: Mechanical Traction:  Liliana received intermittent mechanical traction to the cervical spine at a force of 20 pounds(to comfort) for 30 sec holds and 10lbs for 10 seconds for a  "total of 15 minutes.  Patient tolerated treatment well without any adverse effects.    Liliana received the following supervised modalities after being cleared for contradictions: IFC:  Liliana received IFC electrical stimulation  To tolerance for pain to the C spine and upper trap region for 0 minutes. Liliana tolerated treatment well without any adverse effects. (NP)      Liliana received hot pack for 0 minutes to C-spine with IFC.     Liliana received cold pack for 0 minutes to 0.      Home Exercises Provided and Patient Education Provided     Education provided:   - Patient educated on keeping exercises at home comfortable including occipital exercise with balls and if comfort could not be maintained then to stop the exercise.The patient was verbally compliant and was without questions. Patient issued pink thera-band to use for her HEP.    Written Home Exercises Provided: yes.  Exercises were reviewed and Liliana was able to demonstrate them prior to the end of the session.  Liliana demonstrated good  understanding of the education provided.     See EMR under Patient Instructions for exercises provided 07/02/2020.    Assessment     Today, the patient enters reporting a decrease in her original complaints. Patient was much less TTP this day to cervical and upper trap musculature. Patient's main area of concern remains being the left cervical paraspinals and left upper trap at this time. Today, exercises were adjusted to include accessory muscles so as not to specifically flare up cervical muscles. Patient performed there ex this day without an increase in complaints. Mechanical traction used as the patient and this PT believe that although the neck harness is uncomfortable while the traction is being performed that the resulting benefits are worth the discomfort. However, after traction this day the patient stated that she "don't believe I want to use it again." PT acknowledges. Patient inquiring about dry needling which is " not performed at this facility, but PT can assist the patient in transferring if she continues to desire that modality. Patient was appreciative. PT will continue to follow and adjust mechanical traction if needed.Pt will continue to benefit from skilled PT to improve cervical strength to reduce symptomology and allow pt to return to pain free independence with PLOF.     Liliana is progressing well towards her goals.   Pt prognosis is Good.     Pt will continue to benefit from skilled outpatient physical therapy to address the deficits listed in the problem list box on initial evaluation, provide pt/family education and to maximize pt's level of independence in the home and community environment.     Pt's spiritual, cultural and educational needs considered and pt agreeable to plan of care and goals.     Anticipated barriers to physical therapy: none    Goals:     STG  Weeks/Visits Date Established  Goal Status    1.Pt will increase B SB AROM to >/= 42 deg to improve cervical mobility  4 weeks  6/15/2020    In Progress   2. Pt will report </= 2 incidents of night pain in the past week to improve sleep quality  4 weeks  6/15/2020    In Progress    3.Pt will report </= 18% disability on NDI to indicate improved participation and quality of life 4 weeks  6/15/2020    In Progress    4.Pt will report </= 7/10 for at worst pain to improve quality of life  4 weeks  6/15/2020    In Progress    5.Pt will demonstrate independence and compliance with HEP to improve therapy outcomes.  4 weeks  6/15/2020    In Progress       LTG Weeks/Visits Date Established  Goal Status    1. Pt will increase B cervical rotation to >/= 70 deg to improve ability to look over shoulder during driving  8 weeks  6/15/2020        2. Pt will report no incidents of night pain in the past week to improve sleep quality 8 weeks 6/15/2020           3.Pt will report </= 12% disability on NDI to indicate improved participation and quality of life 8 weeks  6/15/2020        4.Pt will report </= 3/10 for at worst pain to improve quality of life  8 weeks 6/15/2020        5. Pt will increase BUE strength to >/= 4+/5 to improve ability to lift objects overhead  8 weeks 6/15/2020        Plan     Progress periscapular strengthening and accessory muscles whree appropriate. Continue to follow up with outcome from traction.     Plan of care Certification: 6/15/2020 to 8/21/2020. PT will continue to progress as the patient tolerates.     Outpatient Physical Therapy 2 times weekly for 6 weeks followed by 1x/wk for 2 weeks to include the following interventions: Cervical/Lumbar Traction, Electrical Stimulation TENS, Manual Therapy, Moist Heat/ Ice, Neuromuscular Re-ed, Patient Education, Therapeutic Activites, Therapeutic Exercise and Ultrasound. Pt may be seen by PTA as part of the rehabilitation team.         Chris Urban, PT

## 2020-07-02 ENCOUNTER — DOCUMENTATION ONLY (OUTPATIENT)
Dept: REHABILITATION | Facility: HOSPITAL | Age: 75
End: 2020-07-02

## 2020-07-02 ENCOUNTER — CLINICAL SUPPORT (OUTPATIENT)
Dept: REHABILITATION | Facility: HOSPITAL | Age: 75
End: 2020-07-02
Payer: MEDICARE

## 2020-07-02 DIAGNOSIS — M53.82 DECREASED RANGE OF MOTION OF INTERVERTEBRAL DISCS OF CERVICAL SPINE: Primary | ICD-10-CM

## 2020-07-02 DIAGNOSIS — R52 TENDERNESS: ICD-10-CM

## 2020-07-02 DIAGNOSIS — R53.1 DECREASED STRENGTH: ICD-10-CM

## 2020-07-02 PROCEDURE — 97012 MECHANICAL TRACTION THERAPY: CPT

## 2020-07-02 PROCEDURE — 97110 THERAPEUTIC EXERCISES: CPT

## 2020-07-02 NOTE — PROGRESS NOTES
PT/PTA face to face conference completed regarding patient treatment plan and progress towards established goals. Treatment will be continued as described in initial report/ evaluation and treatment/progress notes. Patient will be seen by physical therapist every sixth visit or minimally once per month.       Nicolette Fonseca, PTA

## 2020-07-06 ENCOUNTER — PATIENT OUTREACH (OUTPATIENT)
Dept: ADMINISTRATIVE | Facility: OTHER | Age: 75
End: 2020-07-06

## 2020-07-06 NOTE — PROGRESS NOTES
Requested updates within Care Everywhere.  Patient's chart was reviewed for overdue YANCY topics.  Immunizations reconciled.

## 2020-07-06 NOTE — PROGRESS NOTES
"                               [facility] OUTPATIENT  Physical Therapy Daily Treatment Note     Name: Liliana Hairston  Clinic Number: [id number]    Therapy Diagnosis:   Encounter Diagnoses   Name Primary?    Decreased range of motion of intervertebral discs of cervical spine Yes    Tenderness     Decreased strength      Physician: Bernice Olsen, DNP    Visit Date: 7/7/2020    Physician Orders: PT Eval and Treat  Medical Diagnosis from Referral: cervicalgia   Evaluation Date: 6/15/2020  Authorization Period Expiration: 12/31/2020  Current Certification Period: 6/15/2020 - 8/21/2020   Plan of Care Expiration: 8/21/2020  Visit # / Visits authorized: 7/ 104 (6/14 per POC)      Time In: 9:30 am  Time Out: 10:18 am  Total Billable Time: 42 minutes    Precautions: Standard    Subjective     Pt reports: "It's just still a nagging ache. I can't seem to get over the hump. It's right here(left upper trap). I'd give the nagging ache a 3/10. I'm just tired of having it." PT acknowledges.     She was compliant with home exercise program.  Response to previous treatment: No adverse reactions.  Functional change: No functional changes since the last PT session.    Pain: 3/10  Location: bilateral neck and upper traps  (with emphasis on left upper trap)    Objective     Liliana received therapeutic exercises to develop strength, endurance, ROM, flexibility and posture for 30 minutes including:    -UBE x 6 minutes alternating fwd and back  -Cybex scapular retractions with 2 plates x 30 reps  -+Lat pull downs with 2 plates x 20 reps  -Bruggers with OTB 3 x 10 reps  -Horizontal abduction with OTB x 30 reps  -UE add with big red ball x 20 reps  -Isometric big red ball rolling with BUEs on mat at 1 minute/rep of fwd/back, left to right, CW and CCW (4 minutes total)  -cervical flexion (head off of pillow) and isometric extension for 1-2 second hold into the pillow x 15 each      Not performed:  -Standing scapular " retractions without UT compensations 3 sec holds x 20 reps with pink TB resistance  -Upper Trap stretch at 30 seconds x 2 bilaterally  -levator scap stretch 3 x 30 sec  -Cervical chin tuck in supine 5 sec holds x 15 reps  -Cervical chin tuck with flexion 5 sec holds 2 x 5 reps  -sub occipital release with balls performing 10 nods:CW, CCW, yes, and no(painful towards the end. Would do only pain free directions or NP this next time)  -cervical flexion (head off of pillow) and isometric extension for 1-2 second hold into the pillow x 15 each  supine wand flexion with 2 pound weight attached x 15 reps  Supine SA punches 2 x 15 reps with wand and 2# weight   -+isometric cervical R rotation and L rotation with 5 second holds x 10 reps each  -sub occipital stretch with towel 3 x 30 sec  -T-band one arm pull downs (doing both UEs separately) using GTB x 20 reps each  -T-band UE flexion (each UE separately) with PTB x 20 reps each    Liliana received the following manual therapy techniques: Manual traction and Soft tissue Mobilization were applied to the: C spine for 0 minutes, including:  Sub occipitals   Levator scap  UT    Liliana participated in dynamic functional therapeutic activities to improve functional performance for 0  minutes, includin  Liliana received the following direct contact modalities after being cleared for contraindications: Ultrasound:  Liliana received ultrasound to manage pain and inflammation at 0 % duty cycle applied to the 0 at an intensity of  number entry box W/cm2  for a duration of 0 minutes. Patient tolerated treatment well without adverse effects. Therapist was in attendance throughout intervention.    Liliana received the following supervised modalities after being cleared for contradictions: Mechanical Traction:  Liliana received intermittent mechanical traction to the cervical spine at a force of 20 pounds(to comfort) for 30 sec holds and 10lbs for 10 seconds for a total of 0 minutes.  Patient  "tolerated treatment well without any adverse effects.    Liliana received the following supervised modalities after being cleared for contradictions: IFC:  Liliana received IFC electrical stimulation to tolerance for pain to the C spine and left upper trap region for 12 minutes. Liliana tolerated treatment well without any adverse effects. (NP)      Liliana received hot pack for 12 minutes to left upper trap with IFC and three towel layers.    Liliana received cold pack for 0 minutes to 0.      Home Exercises Provided and Patient Education Provided     Education provided:   - Patient educated on keeping exercises at home comfortable including occipital exercise with balls and if comfort could not be maintained then to stop the exercise.The patient was verbally compliant and was without questions. Patient issued pink thera-band to use for her HEP.    Written Home Exercises Provided: yes.  Exercises were reviewed and Liliana was able to demonstrate them prior to the end of the session.  Liliana demonstrated good  understanding of the education provided.     See EMR under Patient Instructions for exercises provided 07/02/2020.    Assessment     Today, the patient enters reporting a continued "nagging ache." Patient's main area of concern remains being the left cervical paraspinals and left upper trap at this time.  Exercises were adjusted again this day to include accessory muscles so as not to specifically flare up cervical muscles. IFC used this day as the patient reported a temporary relief in complaints when IFC was used earlier in the POC. Patient performed there ex this day without an increase in complaints. PT discussed dry needling with the patient as she had previously asked about it. Patient remains interested in dry needling. Patient can continue to benefit from skilled PT to improve cervical strength to reduce symptomology and allow pt to return to pain free independence with PLOF.     Liliana is progressing well towards " her goals.   Pt prognosis is Good.     Pt will continue to benefit from skilled outpatient physical therapy to address the deficits listed in the problem list box on initial evaluation, provide pt/family education and to maximize pt's level of independence in the home and community environment.     Pt's spiritual, cultural and educational needs considered and pt agreeable to plan of care and goals.     Anticipated barriers to physical therapy: none    Goals:     STG  Weeks/Visits Date Established  Goal Status    1.Pt will increase B SB AROM to >/= 42 deg to improve cervical mobility  4 weeks  6/15/2020    In Progress   2. Pt will report </= 2 incidents of night pain in the past week to improve sleep quality  4 weeks  6/15/2020    In Progress    3.Pt will report </= 18% disability on NDI to indicate improved participation and quality of life 4 weeks  6/15/2020    In Progress    4.Pt will report </= 7/10 for at worst pain to improve quality of life  4 weeks  6/15/2020    In Progress    5.Pt will demonstrate independence and compliance with HEP to improve therapy outcomes.  4 weeks  6/15/2020    In Progress       LTG Weeks/Visits Date Established  Goal Status    1. Pt will increase B cervical rotation to >/= 70 deg to improve ability to look over shoulder during driving  8 weeks  6/15/2020        2. Pt will report no incidents of night pain in the past week to improve sleep quality 8 weeks 6/15/2020           3.Pt will report </= 12% disability on NDI to indicate improved participation and quality of life 8 weeks 6/15/2020        4.Pt will report </= 3/10 for at worst pain to improve quality of life  8 weeks 6/15/2020        5. Pt will increase BUE strength to >/= 4+/5 to improve ability to lift objects overhead  8 weeks 6/15/2020        Plan       Plan of care Certification: 6/15/2020 to 8/21/2020. PT will continue to progress as the patient tolerates. PT will assist the patient in finding a facility where dry  needling is performed if she remains interested.     Outpatient Physical Therapy 2 times weekly for 6 weeks followed by 1x/wk for 2 weeks to include the following interventions: Cervical/Lumbar Traction, Electrical Stimulation TENS, Manual Therapy, Moist Heat/ Ice, Neuromuscular Re-ed, Patient Education, Therapeutic Activites, Therapeutic Exercise and Ultrasound. Pt may be seen by PTA as part of the rehabilitation team.         Chris Urban, PT

## 2020-07-07 ENCOUNTER — CLINICAL SUPPORT (OUTPATIENT)
Dept: REHABILITATION | Facility: HOSPITAL | Age: 75
End: 2020-07-07
Payer: MEDICARE

## 2020-07-07 DIAGNOSIS — R52 TENDERNESS: ICD-10-CM

## 2020-07-07 DIAGNOSIS — R53.1 DECREASED STRENGTH: ICD-10-CM

## 2020-07-07 DIAGNOSIS — M53.82 DECREASED RANGE OF MOTION OF INTERVERTEBRAL DISCS OF CERVICAL SPINE: Primary | ICD-10-CM

## 2020-07-07 PROCEDURE — 97110 THERAPEUTIC EXERCISES: CPT

## 2020-07-07 PROCEDURE — 97014 ELECTRIC STIMULATION THERAPY: CPT

## 2020-07-07 NOTE — PROGRESS NOTES
"                               [facility] OUT[facility]  Physical Therapy Daily Treatment Note     Name: Liliana Hairston  Clinic Number: [id number]    Therapy Diagnosis:   Encounter Diagnoses   Name Primary?    Decreased range of motion of intervertebral discs of cervical spine Yes    Tenderness     Decreased strength      Physician: Bernice Olsen, DNP    Visit Date: 7/9/2020    Physician Orders: PT Eval and Treat  Medical Diagnosis from Referral: cervicalgia   Evaluation Date: 6/15/2020  Authorization Period Expiration: 12/31/2020  Current Certification Period: 6/15/2020 - 8/21/2020   Plan of Care Expiration: 8/21/2020  Visit # / Visits authorized: 8/ 104 (7/14 per POC)      Time In: 10:00 am  Time Out: 11:01 am  Total Billable Time: 50 minutes    Precautions: Standard    Subjective     Pt reports: "Unfortinately you catch me in the morning when its at its worst at the 3/10. It just still nags me. It did feel better after the last time all the way to the evening but then it returned. I'd say its a 3/10 again right now." PT acknowledges.     She was compliant with home exercise program.    Response to previous treatment: Patient reports temporary pain relief after the last PT session.  Functional change: No functional changes since the last PT session.    Pain: 3/10  Location: bilateral neck and upper traps  (with emphasis on left upper trap)    Objective     Liliana received therapeutic exercises to develop strength, endurance, ROM, flexibility and posture for 35  minutes including:    -UBE x 6 minutes alternating fwd and back  -Cybex scapular retractions with 3 plates x 30 reps  -Lat pull downs with 3 plates x 20 reps  -Bruggers with PinkTB 3 x 10 reps  -Horizontal abduction with Pink TB x 30 reps  -+supine ceiling punches with 5 pound AW attached to cane 10 x 3  -UE add with big red ball x 20 reps  -cervical flexion (head off of pillow) and isometric extension for 1-2 second hold into the pillow x " 15 each      Not performed:  -Isometric big red ball rolling with BUEs on mat at 1 minute/rep of fwd/back, left to right, CW and CCW (4 minutes total)  -Upper Trap stretch at 30 seconds x 2 bilaterally  -levator scap stretch 3 x 30 sec  -Cervical chin tuck with flexion 5 sec holds 2 x 5 reps  supine wand flexion with 2 pound weight attached x 15 re  -isometric cervical R rotation and L rotation with 5 second holds x 10 reps each  -sub occipital stretch with towel 3 x 30 sec  -T-band one arm pull downs (doing both UEs separately) using GTB x 20 reps each  -T-band UE flexion (each UE separately) with PTB x 20 reps each    Liliana received the following manual therapy techniques: Manual traction and Soft tissue Mobilization were applied to the: C spine for 0 minutes, including:  Sub occipitals   Levator scap  UT    Liliana participated in dynamic functional therapeutic activities to improve functional performance for 0  minutes, includin  Liliana received the following direct contact modalities after being cleared for contraindications: Ultrasound:  Liliana received ultrasound to manage pain and inflammation at 0 % duty cycle applied to the 0 at an intensity of  number entry box W/cm2  for a duration of 0 minutes. Patient tolerated treatment well without adverse effects. Therapist was in attendance throughout intervention.    Liliana received the following supervised modalities after being cleared for contradictions: Mechanical Traction:  Liliana received intermittent mechanical traction to the cervical spine at a force of 20 pounds(to comfort) for 30 sec holds and 10lbs for 10 seconds for a total of 0 minutes.  Patient tolerated treatment well without any adverse effects.    Liliana received the following supervised modalities after being cleared for contradictions: IFC:  Liliana received IFC electrical stimulation at  Hz to tolerance for pain to the C spine and left upper trap region for 15 minutes. Liliana tolerated  "treatment well without any adverse effects.       Liliana received hot pack for 15 minutes to left upper trap with IFC and three towel layers.    Liliana received cold pack for 0 minutes to 0.      Home Exercises Provided and Patient Education Provided     Education provided:   - Patient educated on keeping exercises at home comfortable including occipital exercise with balls and if comfort could not be maintained then to stop the exercise.The patient was verbally compliant and was without questions. Patient issued pink thera-band to use for her HEP.    Written Home Exercises Provided: yes.  Exercises were reviewed and Liliana was able to demonstrate them prior to the end of the session.  Liliana demonstrated good  understanding of the education provided.     See EMR under Patient Instructions for exercises provided 07/02/2020.    Assessment     Today, the patient continues with her "nagging ache" that improves through out the day. She did report a decrease in pain after the last PT session most likely attributable to returning to use the IFC with HP as a modality. Exercises were progressed this day without difficulty. Still hopeful that as cervical and pariscapular strength improves that the nagging pain will decrease. However, patient may need intervention from a dry needling specialist. Patient does still inquire about the dry needling but has not made her mind up as of today. IFC used again this day as the patient reported a temporary relief in complaints when IFC was used after the last PT session. Patient performed there ex this day without an increase in complaints. Patient can continue to benefit from skilled PT to improve cervical strength to reduce symptomology and allow pt to return to pain free independence with PLOF.     Liliana is progressing well towards her goals.   Pt prognosis is Good.     Pt will continue to benefit from skilled outpatient physical therapy to address the deficits listed in the problem list " box on initial evaluation, provide pt/family education and to maximize pt's level of independence in the home and community environment.     Pt's spiritual, cultural and educational needs considered and pt agreeable to plan of care and goals.     Anticipated barriers to physical therapy: none    Goals:     STG  Weeks/Visits Date Established  Goal Status    1.Pt will increase B SB AROM to >/= 42 deg to improve cervical mobility  4 weeks  6/15/2020    In Progress   2. Pt will report </= 2 incidents of night pain in the past week to improve sleep quality  4 weeks  6/15/2020    In Progress    3.Pt will report </= 18% disability on NDI to indicate improved participation and quality of life 4 weeks  6/15/2020    In Progress    4.Pt will report </= 7/10 for at worst pain to improve quality of life  4 weeks  6/15/2020    In Progress    5.Pt will demonstrate independence and compliance with HEP to improve therapy outcomes.  4 weeks  6/15/2020    In Progress       LTG Weeks/Visits Date Established  Goal Status    1. Pt will increase B cervical rotation to >/= 70 deg to improve ability to look over shoulder during driving  8 weeks  6/15/2020        2. Pt will report no incidents of night pain in the past week to improve sleep quality 8 weeks 6/15/2020           3.Pt will report </= 12% disability on NDI to indicate improved participation and quality of life 8 weeks 6/15/2020        4.Pt will report </= 3/10 for at worst pain to improve quality of life  8 weeks 6/15/2020        5. Pt will increase BUE strength to >/= 4+/5 to improve ability to lift objects overhead  8 weeks 6/15/2020        Plan       Plan of care Certification: 6/15/2020 to 8/21/2020. PT will continue to progress as the patient tolerates and as improvements are noted.     Outpatient Physical Therapy 2 times weekly for 6 weeks followed by 1x/wk for 2 weeks to include the following interventions: Cervical/Lumbar Traction, Electrical Stimulation TENS, Manual  Therapy, Moist Heat/ Ice, Neuromuscular Re-ed, Patient Education, Therapeutic Activites, Therapeutic Exercise and Ultrasound. Pt may be seen by PTA as part of the rehabilitation team.         Chris Urban, PT

## 2020-07-08 ENCOUNTER — OFFICE VISIT (OUTPATIENT)
Dept: OBSTETRICS AND GYNECOLOGY | Facility: CLINIC | Age: 75
End: 2020-07-08
Payer: MEDICARE

## 2020-07-08 VITALS
DIASTOLIC BLOOD PRESSURE: 60 MMHG | BODY MASS INDEX: 24.65 KG/M2 | HEIGHT: 64 IN | WEIGHT: 144.38 LBS | SYSTOLIC BLOOD PRESSURE: 100 MMHG

## 2020-07-08 DIAGNOSIS — Z17.0 ESTROGEN RECEPTOR POSITIVE: ICD-10-CM

## 2020-07-08 DIAGNOSIS — N89.8 VAGINAL DRYNESS: ICD-10-CM

## 2020-07-08 DIAGNOSIS — Z85.3 HISTORY OF BREAST CANCER: ICD-10-CM

## 2020-07-08 DIAGNOSIS — Z01.419 WELL WOMAN EXAM WITH ROUTINE GYNECOLOGICAL EXAM: Primary | ICD-10-CM

## 2020-07-08 DIAGNOSIS — N94.19 DYSPAREUNIA DUE TO MEDICAL CONDITION IN FEMALE: ICD-10-CM

## 2020-07-08 PROCEDURE — 99999 PR PBB SHADOW E&M-EST. PATIENT-LVL III: ICD-10-PCS | Mod: PBBFAC,,, | Performed by: OBSTETRICS & GYNECOLOGY

## 2020-07-08 PROCEDURE — G0101 CA SCREEN;PELVIC/BREAST EXAM: HCPCS | Mod: S$PBB,,, | Performed by: OBSTETRICS & GYNECOLOGY

## 2020-07-08 PROCEDURE — G0101 CA SCREEN;PELVIC/BREAST EXAM: HCPCS | Mod: PBBFAC,PN | Performed by: OBSTETRICS & GYNECOLOGY

## 2020-07-08 PROCEDURE — 99213 OFFICE O/P EST LOW 20 MIN: CPT | Mod: PBBFAC,PN | Performed by: OBSTETRICS & GYNECOLOGY

## 2020-07-08 PROCEDURE — 99999 PR PBB SHADOW E&M-EST. PATIENT-LVL III: CPT | Mod: PBBFAC,,, | Performed by: OBSTETRICS & GYNECOLOGY

## 2020-07-08 PROCEDURE — G0101 PR CA SCREEN;PELVIC/BREAST EXAM: ICD-10-PCS | Mod: S$PBB,,, | Performed by: OBSTETRICS & GYNECOLOGY

## 2020-07-08 NOTE — PROGRESS NOTES
Chief Complaint   Patient presents with    [facility]    Well Woman    last pap roughly 10 years ago    lst mmg january     History of Present Illness: Liliana Hairston is a 74 y.o. female that presents today 7/8/2020 for well gyn visit.    Past Medical History:   Diagnosis Date    Breast cancer     Cancer     breast, right '97    Cataract        Past Surgical History:   Procedure Laterality Date    APPENDECTOMY      BREAST SURGERY  1997    right mastectomy    COLONOSCOPY N/A 2/21/2018    Procedure: COLONOSCOPY;  Surgeon: Cristel Fritz MD;  Location: [facility];  Service: Endoscopy;  Laterality: N/A;    EYE SURGERY      HYSTERECTOMY      LUNG SURGERY  1998    remove cyst on top of right lobe    right mastectomy  2015       Current Outpatient Medications   Medication Sig Dispense Refill    anastrozole (ARIMIDEX) 1 mg Tab Take 1 tablet (1 mg total) by mouth once daily. 90 tablet 3    azelastine (ASTELIN) 137 mcg (0.1 %) nasal spray 1 spray (137 mcg total) by Nasal route 2 (two) times daily. 30 mL 5    CALCIUM CARBONATE/VITAMIN D3 (CALCIUM 500 + D ORAL) Take by mouth.      meloxicam (MOBIC) 15 MG tablet Take 1 tablet (15 mg total) by mouth once daily. 30 tablet 0    METAMUCIL, SUGAR, Powd Take 1 Package by mouth once daily.  0    multivitamin (ONE DAILY MULTIVITAMIN) per tablet Take 1 tablet by mouth once daily.      triamcinolone acetonide 0.1% (KENALOG) 0.1 % cream APPLY CREAM EXTERNALLY TWICE DAILY TO ITCHY RASH  3     No current facility-administered medications for this visit.        Review of patient's allergies indicates:  No Known Allergies    Family History   Problem Relation Age of Onset    Breast cancer Maternal Aunt     Ovarian cancer Neg Hx        Social History     Socioeconomic History    Marital status:      Spouse name: Not on file    Number of children: Not on file    Years of education: Not on file    Highest education level: Not on file   Occupational  "History    Not on file   Social Needs    Financial resource strain: Not on file    Food insecurity     Worry: Not on file     Inability: Not on file    Transportation needs     Medical: Not on file     Non-medical: Not on file   Tobacco Use    Smoking status: Never Smoker    Smokeless tobacco: Never Used   Substance and Sexual Activity    Alcohol use: Yes     Alcohol/week: 4.0 standard drinks     Types: 2 Glasses of wine, 2 Cans of beer per week     Comment: 2x per week wine or beer    Drug use: No    Sexual activity: Not on file   Lifestyle    Physical activity     Days per week: Not on file     Minutes per session: Not on file    Stress: Not on file   Relationships    Social connections     Talks on phone: Not on file     Gets together: Not on file     Attends [employer] service: Not on file     Active member of club or organization: Not on file     Attends meetings of clubs or organizations: Not on file     Relationship status: Not on file   Other Topics Concern    Not on file   Social History Narrative    Not on file       OB History    Para Term  AB Living   3 3 3         SAB TAB Ectopic Multiple Live Births                  # Outcome Date GA Lbr Celestine/2nd Weight Sex Delivery Anes PTL Lv   3 Term            2 Term            1 Term                Review of Symptoms:  GENERAL: Denies weight gain or weight loss. Feeling well overall.   SKIN: Denies rash or lesions.   HEAD: Denies head injury or headache.   NODES: Denies enlarged lymph nodes.   CHEST: Denies chest pain or shortness of breath.   CARDIOVASCULAR: Denies palpitations or left sided chest pain.   ABDOMEN: No abdominal pain, constipation, diarrhea, nausea, vomiting or rectal bleeding.   URINARY: No frequency, dysuria, hematuria, or burning on urination.  HEMATOLOGIC: No easy bruisability or excessive bleeding.   MUSCULOSKELETAL: Denies joint pain or swelling.     /60   Ht 5' 4" (1.626 m)   Wt 65.5 kg (144 lb 6.4 oz) "   Physical Exam:  APPEARANCE: Well nourished, well developed, in no acute distress.  SKIN: Normal skin turgor, no lesions.  NECK: Neck symmetric without masses   RESPIRATORY: Normal respiratory effort with no retractions or use of accessory muscles  CARDIOVASCULAR: Peripheral vascular system with no swelling no varicosities and palpation of pulses normal  LYMPHATIC: No enlargements of the lymph nodes noted in the neck, axillae, or groin  ABDOMEN: Soft. No tenderness or masses. No hepatosplenomegaly. No hernias.  BREASTS: Symmetrical, no skin changes or visible lesions. No palpable masses, nipple discharge or adenopathy bilaterally.  PELVIC: Normal external female genitalia without lesions. Normal hair distribution. Adequate perineal body, normal urethral meatus. Urethra with no masses.  Bladder nontender. Vagina moist and well rugated without lesions or discharge. No significant cystocele or rectocele.  Adnexa without masses or tenderness. Urethra and bladder normal.   EXTREMITIES: No clubbing cyanosis or edema.    ASSESSMENT/PLAN:  Well woman exam with routine gynecological exam  -     Ambulatory referral/consult to Gynecology    Vaginal dryness    History of breast cancer    Estrogen receptor positive    Dyspareunia due to medical condition in female          Patient was counseled today on Pap guidelines. We discussed the discontinuing the pap smear after hysterectomy except in certain high risk cases.  We discussed the need for pelvic exams.   We discussed STD screening if at high risk for an STD.  We discussed breast cancer screening with mammograms every other year after the age of 40 and annually after the age of 50.    We discussed colon cancer screening.   Osteoporosis screening with the Dexa Bone Scan discussed when indicated.   She will see her PCP for other health maintenance.       FOLLOW-UP:prn

## 2020-07-08 NOTE — LETTER
July 8, 2020      Bernice Olsen, DNP  [address]  [facility] [address]           [address] [address]  Phone: [phone]  Fax: [phone]          Patient: Liliana Hairston   MR Number: [id number]   YOB: 1945   Date of Visit: 7/8/2020       Dear Bernice Olsen:    Thank you for referring Liliana Hairston to me for evaluation. Attached you will find relevant portions of my assessment and plan of care.    If you have questions, please do not hesitate to call me. I look forward to following Liliana Hairston along with you.    Sincerely,    Holly Michael MD    Enclosure  CC:  No Recipients    If you would like to receive this communication electronically, please contact [email or web address] or [phone] to request more information on [employer] Link access.    For providers and/or their staff who would like to refer a patient to [facility], please contact us through our one-stop-shop provider referral line, [facility], at [phone].    If you feel you have received this communication in error or would no longer like to receive these types of communications, please e-mail [email or web address]

## 2020-07-09 ENCOUNTER — CLINICAL SUPPORT (OUTPATIENT)
Dept: REHABILITATION | Facility: HOSPITAL | Age: 75
End: 2020-07-09
Payer: MEDICARE

## 2020-07-09 DIAGNOSIS — R52 TENDERNESS: ICD-10-CM

## 2020-07-09 DIAGNOSIS — M53.82 DECREASED RANGE OF MOTION OF INTERVERTEBRAL DISCS OF CERVICAL SPINE: Primary | ICD-10-CM

## 2020-07-09 DIAGNOSIS — R53.1 DECREASED STRENGTH: ICD-10-CM

## 2020-07-09 PROCEDURE — 97110 THERAPEUTIC EXERCISES: CPT

## 2020-07-09 PROCEDURE — 97014 ELECTRIC STIMULATION THERAPY: CPT

## 2020-07-16 ENCOUNTER — CLINICAL SUPPORT (OUTPATIENT)
Dept: REHABILITATION | Facility: HOSPITAL | Age: 75
End: 2020-07-16
Payer: MEDICARE

## 2020-07-16 DIAGNOSIS — R53.1 DECREASED STRENGTH: ICD-10-CM

## 2020-07-16 DIAGNOSIS — R52 TENDERNESS: ICD-10-CM

## 2020-07-16 DIAGNOSIS — M53.82 DECREASED RANGE OF MOTION OF INTERVERTEBRAL DISCS OF CERVICAL SPINE: Primary | ICD-10-CM

## 2020-07-16 PROCEDURE — 97110 THERAPEUTIC EXERCISES: CPT

## 2020-07-16 PROCEDURE — 97014 ELECTRIC STIMULATION THERAPY: CPT

## 2020-07-16 NOTE — PROGRESS NOTES
"                               [facility] OUTPATIENT  Physical Therapy Daily Treatment Note     Name: Liliana Hairston  Clinic Number: [id number]    Therapy Diagnosis:   No diagnosis found.  Physician: Bernice Olsen, PARAG    Visit Date: 7/16/2020    Physician Orders: PT Eval and Treat  Medical Diagnosis from Referral: cervicalgia   Evaluation Date: 6/15/2020  Authorization Period Expiration: 12/31/2020  Current Certification Period: 6/15/2020 - 8/21/2020   Plan of Care Expiration: 8/21/2020  Visit # / Visits authorized: 9/ 104 (8/14 per POC)  Re-address goals by 07/21/2020      Time In: 4:00 pm  Time Out: 4:50 pm  Total Billable Time: 50 minutes    Precautions: Standard    Subjective     Pt reports: "I'm about the same today. I'd say it's still a 3/10 today. I did have a few days where it felt better, but it's back to the nagging 3/10 now."    She was compliant with home exercise program.    Response to previous treatment: Patient reports temporary pain relief after the last PT session.  Functional change: No functional changes since the last PT session.    Pain: 3/10  Location: bilateral neck and upper traps  (with emphasis on left upper trap)    Objective     Liliana received therapeutic exercises to develop strength, endurance, ROM, flexibility and posture for 35  minutes including:    -UBE x 6 minutes alternating fwd and back  -Cybex scapular retractions with 3 plates x 30 reps  -Lat pull downs with 3 plates x 30 reps  -+mini band clocks and counter clocks with BUEs and OTB x 10 each side  -+mini band wall climbs with OTB x 10 reps  -+standing BUE UE abduction (into a Y) with 2 pound dumb bells x 20  -UE add with big red ball x 20 reps    Exercises below not performed this day due to being out of time:  -Bruggers with PinkTB 3 x 10 reps  -+supine ceiling punches with 5 pound AW attached to cane 10 x 3  -cervical flexion (head off of pillow) and isometric extension for 1-2 second hold into the " pillow x 15 each      Not performed:  -Horizontal abduction with Pink TB x 30 reps  -Isometric big red ball rolling with BUEs on mat at 1 minute/rep of fwd/back, left to right, CW and CCW (4 minutes total)  -Upper Trap stretch at 30 seconds x 2 bilaterally  -levator scap stretch 3 x 30 sec  -Cervical chin tuck with flexion 5 sec holds 2 x 5 reps  supine wand flexion with 2 pound weight attached x 15 re  -isometric cervical R rotation and L rotation with 5 second holds x 10 reps each  -sub occipital stretch with towel 3 x 30 sec  -T-band one arm pull downs (doing both UEs separately) using GTB x 20 reps each  -T-band UE flexion (each UE separately) with PTB x 20 reps each    Liliana received the following manual therapy techniques: Manual traction and Soft tissue Mobilization were applied to the: C spine for 0 minutes, including:  Sub occipitals   Levator scap  UT    Liliana participated in dynamic functional therapeutic activities to improve functional performance for 0  minutes, includin  Liliana received the following direct contact modalities after being cleared for contraindications: Ultrasound:  Liliana received ultrasound to manage pain and inflammation at 0 % duty cycle applied to the 0 at an intensity of  number entry box W/cm2  for a duration of 0 minutes. Patient tolerated treatment well without adverse effects. Therapist was in attendance throughout intervention.    Liliana received the following supervised modalities after being cleared for contradictions: Mechanical Traction:  Liliana received intermittent mechanical traction to the cervical spine at a force of 20 pounds(to comfort) for 30 sec holds and 10lbs for 10 seconds for a total of 0 minutes.  Patient tolerated treatment well without any adverse effects.    Liliana received the following supervised modalities after being cleared for contradictions: IFC:  Liliana received IFC electrical stimulation at  Hz to tolerance for pain to the C spine and left  "upper trap region for 15 minutes. Liliana tolerated treatment well without any adverse effects.       Liliana received hot pack for 15 minutes to left upper trap with IFC and three towel layers.    Liliana received cold pack for 0 minutes to 0.      Home Exercises Provided and Patient Education Provided     Education provided:   - Patient educated on keeping exercises at home comfortable including occipital exercise with balls and if comfort could not be maintained then to stop the exercise.The patient was verbally compliant and was without questions. Patient issued pink thera-band to use for her HEP.    Written Home Exercises Provided: yes.  Exercises were reviewed and Liliana was able to demonstrate them prior to the end of the session.  Liliana demonstrated good  understanding of the education provided.     See EMR under Patient Instructions for exercises provided 07/02/2020.    Assessment     Today, the patient continues with her "nagging ache" that improves through out the day. She did report a couple of days of feeling better since the last PT session. However, the nagging pain returned. She does not appear restricted cervically at this time. There is no left upper trap trigger point this day. However, her complaints do appear muscular in nature at this time. Patient may benefit from addition of dry needling to have the best chance of reaching her LTGs. IFC used again this day as the patient reported a temporary relief in complaints when IFC was used after the last PT session. Patient performed there ex this day without an increase in complaints. Patient can continue to benefit from skilled PT to improve cervical strength to reduce symptomology and allow pt to return to pain free independence with PLOF.     Liliana is progressing well towards her goals.   Pt prognosis is Good.     Pt will continue to benefit from skilled outpatient physical therapy to address the deficits listed in the problem list box on initial " evaluation, provide pt/family education and to maximize pt's level of independence in the home and community environment.     Pt's spiritual, cultural and educational needs considered and pt agreeable to plan of care and goals.     Anticipated barriers to physical therapy: none    Goals:     STG  Weeks/Visits Date Established  Goal Status    1.Pt will increase B SB AROM to >/= 42 deg to improve cervical mobility  4 weeks  6/15/2020    In Progress   2. Pt will report </= 2 incidents of night pain in the past week to improve sleep quality  4 weeks  6/15/2020    In Progress    3.Pt will report </= 18% disability on NDI to indicate improved participation and quality of life 4 weeks  6/15/2020    In Progress    4.Pt will report </= 7/10 for at worst pain to improve quality of life  4 weeks  6/15/2020    In Progress    5.Pt will demonstrate independence and compliance with HEP to improve therapy outcomes.  4 weeks  6/15/2020    In Progress       LTG Weeks/Visits Date Established  Goal Status    1. Pt will increase B cervical rotation to >/= 70 deg to improve ability to look over shoulder during driving  8 weeks  6/15/2020        2. Pt will report no incidents of night pain in the past week to improve sleep quality 8 weeks 6/15/2020           3.Pt will report </= 12% disability on NDI to indicate improved participation and quality of life 8 weeks 6/15/2020        4.Pt will report </= 3/10 for at worst pain to improve quality of life  8 weeks 6/15/2020        5. Pt will increase BUE strength to >/= 4+/5 to improve ability to lift objects overhead  8 weeks 6/15/2020        Plan       Plan of care Certification: 6/15/2020 to 8/21/2020. PT will continue to progress as the patient tolerates and as improvements are noted and will attempt to transfer her to a clinic with dry needling.     Outpatient Physical Therapy 2 times weekly for 6 weeks followed by 1x/wk for 2 weeks to include the following interventions: Cervical/Lumbar  Traction, Electrical Stimulation TENS, Manual Therapy, Moist Heat/ Ice, Neuromuscular Re-ed, Patient Education, Therapeutic Activites, Therapeutic Exercise and Ultrasound. Pt may be seen by PTA as part of the rehabilitation team.         Chris Urban, PT

## 2020-07-21 ENCOUNTER — CLINICAL SUPPORT (OUTPATIENT)
Dept: REHABILITATION | Facility: HOSPITAL | Age: 75
End: 2020-07-21
Payer: MEDICARE

## 2020-07-21 DIAGNOSIS — R52 TENDERNESS: ICD-10-CM

## 2020-07-21 DIAGNOSIS — M53.82 DECREASED RANGE OF MOTION OF INTERVERTEBRAL DISCS OF CERVICAL SPINE: ICD-10-CM

## 2020-07-21 DIAGNOSIS — R53.1 DECREASED STRENGTH: ICD-10-CM

## 2020-07-21 PROCEDURE — 97140 MANUAL THERAPY 1/> REGIONS: CPT | Mod: PN

## 2020-07-21 PROCEDURE — 97110 THERAPEUTIC EXERCISES: CPT | Mod: PN

## 2020-07-21 NOTE — PROGRESS NOTES
[facility] OUTPATIENT  Physical Therapy Daily Treatment Note     Name: Liliana Hairston  Clinic Number: [id number]    Therapy Diagnosis:   Encounter Diagnoses   Name Primary?    Decreased range of motion of intervertebral discs of cervical spine     Tenderness     Decreased strength      Physician: Bernice Olsen, PARAG    Visit Date: 7/21/2020    Physician Orders: PT Eval and Treat  Medical Diagnosis from Referral: cervicalgia   Evaluation Date: 6/15/2020  Authorization Period Expiration: 12/31/2020  Current Certification Period: 6/15/2020 - 8/21/2020   Plan of Care Expiration: 8/21/2020  Visit # / Visits authorized: 10/ 104 (1/12 per POC)    Time In: 8:50 am  Time Out:  9:35 am  Total Billable Time: 45 minutes    Precautions: Standard    Subjective     Pt reports: Constant left sided upper trap guarding and left sided headache.     She was compliant with home exercise program.    Response to previous treatment: Patient reports temporary pain relief after the last PT session.  Functional change: No functional changes since the last PT session.    Pain: 3/10  Location: bilateral neck and upper traps  (with emphasis on left upper trap)    Objective     Liliana received therapeutic exercises to develop strength, endurance, ROM, flexibility and posture for 30  minutes including:    -UBE x 6 minutes alternating fwd and back  -Cybex scapular retractions with 3 plates x 30 reps  -Lat pull downs with 3 plates x 30 reps  -+mini band clocks and counter clocks with BUEs and RTB x 10 each side  -+mini band wall climbs with RTB x 10 reps  -Bruggers with RTB 3 x 10 reps    Manual Therapy 15 min:  Following informed written consent pt received Integrative Dry Needling as follows:  Bilateral C4-5 paraspinals with .[id number] needles with IMS applied at 2.0mA x 5 min  Left upper trap TP with .[id number] needle with visible muscle twitch  Pt tolerated treatment with no adverse reaction and  voiced understanding of post needling instructions.       Exercises below not performed this day due to being out of time:  -UE add with big red ball x 20 reps  -+standing BUE UE abduction (into a Y) with 2 pound dumb bells x 20  -+supine ceiling punches with 5 pound AW attached to cane 10 x 3  -cervical flexion (head off of pillow) and isometric extension for 1-2 second hold into the pillow x 15 each      Not performed:  -Horizontal abduction with Pink TB x 30 reps  -Isometric big red ball rolling with BUEs on mat at 1 minute/rep of fwd/back, left to right, CW and CCW (4 minutes total)  -Upper Trap stretch at 30 seconds x 2 bilaterally  -levator scap stretch 3 x 30 sec  -Cervical chin tuck with flexion 5 sec holds 2 x 5 reps  supine wand flexion with 2 pound weight attached x 15 re  -isometric cervical R rotation and L rotation with 5 second holds x 10 reps each  -sub occipital stretch with towel 3 x 30 sec  -T-band one arm pull downs (doing both UEs separately) using GTB x 20 reps each  -T-band UE flexion (each UE separately) with PTB x 20 reps each    Liliana received the following manual therapy techniques: Manual traction and Soft tissue Mobilization were applied to the: C spine for 0 minutes, including:  Sub occipitals   Levator scap  UT      Home Exercises Provided and Patient Education Provided     Education provided:   - Patient educated on keeping exercises at home comfortable including occipital exercise with balls and if comfort could not be maintained then to stop the exercise.The patient was verbally compliant and was without questions. Patient issued pink thera-band to use for her HEP.    Written Home Exercises Provided: yes.  Exercises were reviewed and Liliana was able to demonstrate them prior to the end of the session.  Liliana demonstrated good  understanding of the education provided.     See EMR under Patient Instructions for exercises provided 07/02/2020.    Assessment     Pt tolerated initial dry  needling session well. Noted left UT and C4-5 trigger points. Patient will continue to benefit from skilled PT to improve cervical strength to reduce symptomology and allow pt to return to pain free independence with PLOF.     Liliana is progressing well towards her goals.   Pt prognosis is Good.     Pt will continue to benefit from skilled outpatient physical therapy to address the deficits listed in the problem list box on initial evaluation, provide pt/family education and to maximize pt's level of independence in the home and community environment.     Pt's spiritual, cultural and educational needs considered and pt agreeable to plan of care and goals.     Anticipated barriers to physical therapy: none    Goals:     STG  Weeks/Visits Date Established  Goal Status    1.Pt will increase B SB AROM to >/= 42 deg to improve cervical mobility  4 weeks  6/15/2020    In Progress   2. Pt will report </= 2 incidents of night pain in the past week to improve sleep quality  4 weeks  6/15/2020    In Progress    3.Pt will report </= 18% disability on NDI to indicate improved participation and quality of life 4 weeks  6/15/2020    In Progress    4.Pt will report </= 7/10 for at worst pain to improve quality of life  4 weeks  6/15/2020    In Progress    5.Pt will demonstrate independence and compliance with HEP to improve therapy outcomes.  4 weeks  6/15/2020    In Progress       LTG Weeks/Visits Date Established  Goal Status    1. Pt will increase B cervical rotation to >/= 70 deg to improve ability to look over shoulder during driving  8 weeks  6/15/2020        2. Pt will report no incidents of night pain in the past week to improve sleep quality 8 weeks 6/15/2020           3.Pt will report </= 12% disability on NDI to indicate improved participation and quality of life 8 weeks 6/15/2020        4.Pt will report </= 3/10 for at worst pain to improve quality of life  8 weeks 6/15/2020        5. Pt will increase BUE strength to  >/= 4+/5 to improve ability to lift objects overhead  8 weeks 6/15/2020        Plan       Plan of care Certification: 6/15/2020 to 8/21/2020. Monitor response to dry needling     Outpatient Physical Therapy 2 times weekly for 6 weeks followed by 1x/wk for 2 weeks to include the following interventions: Cervical/Lumbar Traction, Electrical Stimulation TENS, Manual Therapy, Moist Heat/ Ice, Neuromuscular Re-ed, Patient Education, Therapeutic Activites, Therapeutic Exercise and Ultrasound. Pt may be seen by PTA as part of the rehabilitation team.         Harmony Zavaleta, PT

## 2020-07-23 ENCOUNTER — CLINICAL SUPPORT (OUTPATIENT)
Dept: REHABILITATION | Facility: HOSPITAL | Age: 75
End: 2020-07-23
Payer: MEDICARE

## 2020-07-23 DIAGNOSIS — R53.1 DECREASED STRENGTH: ICD-10-CM

## 2020-07-23 DIAGNOSIS — R52 TENDERNESS: ICD-10-CM

## 2020-07-23 DIAGNOSIS — M53.82 DECREASED RANGE OF MOTION OF INTERVERTEBRAL DISCS OF CERVICAL SPINE: ICD-10-CM

## 2020-07-23 PROCEDURE — 97140 MANUAL THERAPY 1/> REGIONS: CPT | Mod: PN

## 2020-07-23 PROCEDURE — 97110 THERAPEUTIC EXERCISES: CPT | Mod: PN

## 2020-07-23 NOTE — PROGRESS NOTES
[facility] OUTPATIENT  Physical Therapy Daily Treatment Note     Name: Liliana Hairston  Clinic Number: [id number]    Therapy Diagnosis:   Encounter Diagnoses   Name Primary?    Decreased range of motion of intervertebral discs of cervical spine     Tenderness     Decreased strength      Physician: Bernice Olsen, PARAG    Visit Date: 7/23/2020    Physician Orders: PT Eval and Treat  Medical Diagnosis from Referral: cervicalgia   Evaluation Date: 6/15/2020  Authorization Period Expiration: 12/31/2020  Current Certification Period: 6/15/2020 - 8/21/2020   Plan of Care Expiration: 8/21/2020  Visit # / Visits authorized: 11/ 104 (2/12 per POC)    Time In: 9:40 am  Time Out: 10:30 am  Total Billable Time: 50  minutes    Precautions: Standard    Subjective     Pt reports: Pt states she did have some relief of symptoms for a day following initial needling treatment.      She was compliant with home exercise program.    Response to previous treatment: Patient reports temporary pain relief after the first needling session.   Functional change: No functional changes since the last PT session.    Pain: 3/10  Location: bilateral neck and upper traps  (with emphasis on left upper trap)    Objective     Liliana received therapeutic exercises to develop strength, endurance, ROM, flexibility and posture for 30  minutes including:    -UBE x 6 minutes alternating fwd and back  -+mini band clocks and counter clocks with BUEs and RTB x 10 each side  -+mini band wall climbs with RTB x 10 reps  +Middle trap lift off RTB 1 x 10  +Scap retraction with ER RTB 2 x 10  --L Upper Trap stretch 3 x 15 sec  -L levator scap stretch 3 x 315sec  -Supine cervical chin tuck with towel roll 1 x 10 hold 5 sec      -Supine cervical rotation with towel roll 1 x 10                                                                                                                                                                                                                     Manual Therapy 15 min:  Following informed written consent pt received Integrative Dry Needling as follows:  B greater occipital TP's with .[id number] needle  Bilateral C4-5 paraspinals with .[id number] needles with IMS applied at 2.0mA x 5 min  Left upper trap TP with .[id number] needle with visible muscle twitch  Pt tolerated treatment with no adverse reaction and voiced understanding of post needling instructions.     STM to cervical and UT mm x 5 min    Exercises below not performed this day due to being out of time:  -UE add with big red ball x 20 reps  -+standing BUE UE abduction (into a Y) with 2 pound dumb bells x 20  -+supine ceiling punches with 5 pound AW attached to cane 10 x 3  -cervical flexion (head off of pillow) and isometric extension for 1-2 second hold into the pillow x 15 each      Not performed:  -Cybex scapular retractions with 3 plates x 30 reps  -Lat pull downs with 3 plates x 30 reps  -Horizontal abduction with Pink TB x 30 reps  -Isometric big red ball rolling with BUEs on mat at 1 minute/rep of fwd/back, left to right, CW and CCW (4 minutes total)    supine wand flexion with 2 pound weight attached x 15 re  -isometric cervical R rotation and L rotation with 5 second holds x 10 reps each  -sub occipital stretch with towel 3 x 30 sec  -T-band one arm pull downs (doing both UEs separately) using GTB x 20 reps each  -T-band UE flexion (each UE separately) with PTB x 20 reps each    Liliana received the following manual therapy techniques: Manual traction and Soft tissue Mobilization were applied to the: C spine for 0 minutes, including:  Sub occipitals   Levator scap  UT      Home Exercises Provided and Patient Education Provided     Education provided:   - Patient educated on keeping exercises at home comfortable including occipital exercise with balls and if comfort could not be maintained then to stop the exercise.The patient was verbally  compliant and was without questions. Patient issued pink thera-band to use for her HEP.    Written Home Exercises Provided: yes.  Exercises were reviewed and Liliana was able to demonstrate them prior to the end of the session.  Liliana demonstrated good  understanding of the education provided.     See EMR under Patient Instructions for exercises provided 07/02/2020.    Assessment     Pt with + response to dry needling.  Noted left UT and C4-5 trigger points. Patient will continue to benefit from skilled PT to improve cervical strength to reduce symptomology and allow pt to return to pain free independence with PLOF.     Liliana is progressing well towards her goals.   Pt prognosis is Good.     Pt will continue to benefit from skilled outpatient physical therapy to address the deficits listed in the problem list box on initial evaluation, provide pt/family education and to maximize pt's level of independence in the home and community environment.     Pt's spiritual, cultural and educational needs considered and pt agreeable to plan of care and goals.     Anticipated barriers to physical therapy: none    Goals:     STG  Weeks/Visits Date Established  Goal Status    1.Pt will increase B SB AROM to >/= 42 deg to improve cervical mobility  4 weeks  6/15/2020    In Progress   2. Pt will report </= 2 incidents of night pain in the past week to improve sleep quality  4 weeks  6/15/2020    In Progress    3.Pt will report </= 18% disability on NDI to indicate improved participation and quality of life 4 weeks  6/15/2020    In Progress    4.Pt will report </= 7/10 for at worst pain to improve quality of life  4 weeks  6/15/2020    In Progress    5.Pt will demonstrate independence and compliance with HEP to improve therapy outcomes.  4 weeks  6/15/2020    In Progress       LTG Weeks/Visits Date Established  Goal Status    1. Pt will increase B cervical rotation to >/= 70 deg to improve ability to look over shoulder during driving   8 weeks  6/15/2020        2. Pt will report no incidents of night pain in the past week to improve sleep quality 8 weeks 6/15/2020           3.Pt will report </= 12% disability on NDI to indicate improved participation and quality of life 8 weeks 6/15/2020        4.Pt will report </= 3/10 for at worst pain to improve quality of life  8 weeks 6/15/2020        5. Pt will increase BUE strength to >/= 4+/5 to improve ability to lift objects overhead  8 weeks 6/15/2020        Plan       Plan of care Certification: 6/15/2020 to 8/21/2020. Monitor response to dry needling     Outpatient Physical Therapy 2 times weekly for 6 weeks followed by 1x/wk for 2 weeks to include the following interventions: Cervical/Lumbar Traction, Electrical Stimulation TENS, Manual Therapy, Moist Heat/ Ice, Neuromuscular Re-ed, Patient Education, Therapeutic Activites, Therapeutic Exercise and Ultrasound. Pt may be seen by PTA as part of the rehabilitation team.         Harmony Zavaleta, PT

## 2020-07-28 ENCOUNTER — CLINICAL SUPPORT (OUTPATIENT)
Dept: REHABILITATION | Facility: HOSPITAL | Age: 75
End: 2020-07-28
Payer: MEDICARE

## 2020-07-28 DIAGNOSIS — R52 TENDERNESS: ICD-10-CM

## 2020-07-28 DIAGNOSIS — M53.82 DECREASED RANGE OF MOTION OF INTERVERTEBRAL DISCS OF CERVICAL SPINE: ICD-10-CM

## 2020-07-28 DIAGNOSIS — R53.1 DECREASED STRENGTH: ICD-10-CM

## 2020-07-28 PROCEDURE — 97140 MANUAL THERAPY 1/> REGIONS: CPT | Mod: PN

## 2020-07-28 PROCEDURE — 97110 THERAPEUTIC EXERCISES: CPT | Mod: PN

## 2020-07-28 NOTE — PROGRESS NOTES
[facility] OUTPATIENT  Physical Therapy Daily Treatment Note     Name: Liliana Hairston  Clinic Number: [id number]    Therapy Diagnosis:   Encounter Diagnoses   Name Primary?    Decreased range of motion of intervertebral discs of cervical spine     Tenderness     Decreased strength      Physician: Bernice Olsen, PARAG    Visit Date: 7/28/2020    Physician Orders: PT Eval and Treat  Medical Diagnosis from Referral: cervicalgia   Evaluation Date: 6/15/2020  Authorization Period Expiration: 12/31/2020  Current Certification Period: 6/15/2020 - 8/21/2020   Plan of Care Expiration: 8/21/2020  Visit # / Visits authorized: 12/ 104 (3/12 per POC)    Time In: 11:00 am  Time Out:  11:43 am  Total Billable Time:   43 minutes    Precautions: Standard    Subjective     Pt reports: Pt states the needling is helping some and she has had improvement in HA intensity. Primary c/o pain in the am upon waking.     She was compliant with home exercise program.    Response to previous treatment: Patient reports temporary pain relief after the  needling session.   Functional change: No functional changes since the last PT session.    Pain: 3/10  Location: bilateral neck and upper traps  (with emphasis on left upper trap)    Objective     Liliana received therapeutic exercises to develop strength, endurance, ROM, flexibility and posture for 23  minutes including:    -UBE x 6 minutes alternating fwd and back NP  -scapula clocks with BUEs and RTB x 10 each side  - wall climbs with RTB x 10 reps  -Middle trap lift off RTB 1 x 10  -Scap retraction with ER RTB 2 x 10  - Upper Trap stretch 3 x 15 sec  - levator scap stretch 3 x 15 sec  -Supine cervical chin tuck with towel roll 1 x 10 hold 5 sec      -Supine cervical rotation with towel roll 1 x 10                                                                                                                                                                                                           Manual Therapy 20 min:    STM to cervical and UT mm/ gentle CR at end range rotation 2 x 10 reps  Following informed written consent pt received Integrative Dry Needling as follows:  Bilateral C4-5 paraspinals with .95m94yo needles with IMS applied at 2.0mA x 5 min  Left upper trap TP with .[id number] needle with visible muscle twitch  Pt tolerated treatment with no adverse reaction and voiced understanding of post needling instructions.     Exercises below not performed this day due to being out of time:  -UE add with big red ball x 20 reps  -+standing BUE UE abduction (into a Y) with 2 pound dumb bells x 20  -+supine ceiling punches with 5 pound AW attached to cane 10 x 3  -cervical flexion (head off of pillow) and isometric extension for 1-2 second hold into the pillow x 15 each      Not performed:  -Cybex scapular retractions with 3 plates x 30 reps  -Lat pull downs with 3 plates x 30 reps  -Horizontal abduction with Pink TB x 30 reps  -Isometric big red ball rolling with BUEs on mat at 1 minute/rep of fwd/back, left to right, CW and CCW (4 minutes total)    supine wand flexion with 2 pound weight attached x 15 re  -isometric cervical R rotation and L rotation with 5 second holds x 10 reps each  -sub occipital stretch with towel 3 x 30 sec  -T-band one arm pull downs (doing both UEs separately) using GTB x 20 reps each  -T-band UE flexion (each UE separately) with PTB x 20 reps each      Home Exercises Provided and Patient Education Provided     Education provided:   - Patient educated on trying some supine chin tucks and rotations in the am before getting out of bed to improve joint lubrication prior to sitting up.     Written Home Exercises Provided: yes.  Exercises were reviewed and Liliana was able to demonstrate them prior to the end of the session.  Liliana demonstrated good  understanding of the education provided.     See EMR under Patient Instructions for  exercises provided 07/02/2020.    Assessment     Pt with + response to dry needling. Improved intensity of HA and improved left UT guarding noted. Pt is experiencing am pain due to arthritis.     Liliana is progressing well towards her goals.   Pt prognosis is Good.     Pt will continue to benefit from skilled outpatient physical therapy to address the deficits listed in the problem list box on initial evaluation, provide pt/family education and to maximize pt's level of independence in the home and community environment.     Pt's spiritual, cultural and educational needs considered and pt agreeable to plan of care and goals.     Anticipated barriers to physical therapy: none    Goals:     STG  Weeks/Visits Date Established  Goal Status    1.Pt will increase B SB AROM to >/= 42 deg to improve cervical mobility  4 weeks  6/15/2020    In Progress   2. Pt will report </= 2 incidents of night pain in the past week to improve sleep quality  4 weeks  6/15/2020    In Progress    3.Pt will report </= 18% disability on NDI to indicate improved participation and quality of life 4 weeks  6/15/2020    In Progress    4.Pt will report </= 7/10 for at worst pain to improve quality of life  4 weeks  6/15/2020    In Progress    5.Pt will demonstrate independence and compliance with HEP to improve therapy outcomes.  4 weeks  6/15/2020    In Progress       LTG Weeks/Visits Date Established  Goal Status    1. Pt will increase B cervical rotation to >/= 70 deg to improve ability to look over shoulder during driving  8 weeks  6/15/2020        2. Pt will report no incidents of night pain in the past week to improve sleep quality 8 weeks 6/15/2020           3.Pt will report </= 12% disability on NDI to indicate improved participation and quality of life 8 weeks 6/15/2020        4.Pt will report </= 3/10 for at worst pain to improve quality of life  8 weeks 6/15/2020        5. Pt will increase BUE strength to >/= 4+/5 to improve ability to  lift objects overhead  8 weeks 6/15/2020        Plan       Plan of care Certification: 6/15/2020 to 8/21/2020.      Outpatient Physical Therapy 2 times weekly for 6 weeks followed by 1x/wk for 2 weeks to include the following interventions: Cervical/Lumbar Traction, Electrical Stimulation TENS, Manual Therapy, Moist Heat/ Ice, Neuromuscular Re-ed, Patient Education, Therapeutic Activites, Therapeutic Exercise and Ultrasound. Pt may be seen by PTA as part of the rehabilitation team.         Harmony Zavaleta, PT

## 2020-07-30 ENCOUNTER — CLINICAL SUPPORT (OUTPATIENT)
Dept: REHABILITATION | Facility: HOSPITAL | Age: 75
End: 2020-07-30
Payer: MEDICARE

## 2020-07-30 DIAGNOSIS — M53.82 DECREASED RANGE OF MOTION OF INTERVERTEBRAL DISCS OF CERVICAL SPINE: ICD-10-CM

## 2020-07-30 DIAGNOSIS — R52 TENDERNESS: ICD-10-CM

## 2020-07-30 DIAGNOSIS — R53.1 DECREASED STRENGTH: ICD-10-CM

## 2020-07-30 PROCEDURE — 97110 THERAPEUTIC EXERCISES: CPT | Mod: PN

## 2020-07-30 PROCEDURE — 97140 MANUAL THERAPY 1/> REGIONS: CPT | Mod: PN

## 2020-07-30 NOTE — PROGRESS NOTES
[facility] OUTPATIENT  Physical Therapy Daily Treatment Note     Name: Liliana Hairston  Clinic Number: [id number]    Therapy Diagnosis:   Encounter Diagnoses   Name Primary?    Decreased range of motion of intervertebral discs of cervical spine     Tenderness     Decreased strength      Physician: Bernice Olsen, PARAG    Visit Date: 7/30/2020    Physician Orders: PT Eval and Treat  Medical Diagnosis from Referral: cervicalgia   Evaluation Date: 6/15/2020  Authorization Period Expiration: 12/31/2020  Current Certification Period: 6/15/2020 - 8/21/2020   Plan of Care Expiration: 8/21/2020  Visit # / Visits authorized: 13/ 104 (4/12 per POC)    Time In: 10:30  am  Time Out:  11:10 am  Total Billable Time:   40 minutes    Precautions: Standard    Subjective     Pt reports: Pt states she felt relief of cervical pain following last session with needling. Also did some ROM ex prior to getting out of bed in am and it helped.      She was compliant with home exercise program.    Response to previous treatment: Patient reports pain relief after the  needling session.   Functional change: Able to turn head with less pain.     Pain: 2/10  Location: bilateral neck and upper traps  (with emphasis on left upper trap)    Objective     Liliana received therapeutic exercises to develop strength, endurance, ROM, flexibility and posture for 30  minutes including:    -UBE x 6 minutes alternating fwd and back   -scapula clocks with BUEs and RTB x 10 each side  - wall climbs with RTB x 10 reps  -Middle trap lift off RTB 1 x 10  -Scap retraction with ER RTB 2 x 10  +Shoulder ext GTB x20  +Shoulder rows GTB x 20  +Cervical 4 way isometrics YTB x 10 ea  - Upper Trap stretch 3 x 15 sec  - levator scap stretch 3 x 15 sec  -Supine cervical chin tuck with towel roll 1 x 10 hold 5 sec      -Supine cervical rotation with towel roll 1 x 10                                                                       -isometric cervical R rotation and L rotation with 5 second holds x 10 reps each                                                                                                                                      Manual Therapy 10 min:    STM to cervical and UT mm/ gentle CR at end range rotation 2 x 10 reps      Not performed today:  Following informed written consent pt received Integrative Dry Needling as follows:  Bilateral C4-5 paraspinals with .53o29dd needles with IMS applied at 2.0mA x 5 min  Left upper trap TP with .39m43iu needle with visible muscle twitch  Pt tolerated treatment with no adverse reaction and voiced understanding of post needling instructions.     Exercises below not performed this day due to being out of time:  -UE add with big red ball x 20 reps  -+standing BUE UE abduction (into a Y) with 2 pound dumb bells x 20  -+supine ceiling punches with 5 pound AW attached to cane 10 x 3  -cervical flexion (head off of pillow) and isometric extension for 1-2 second hold into the pillow x 15 each      Not performed:  -Cybex scapular retractions with 3 plates x 30 reps  -Lat pull downs with 3 plates x 30 reps  -Horizontal abduction with Pink TB x 30 reps  -Isometric big red ball rolling with BUEs on mat at 1 minute/rep of fwd/back, left to right, CW and CCW (4 minutes total)    supine wand flexion with 2 pound weight attached x 15 re    -sub occipital stretch with towel 3 x 30 sec  -T-band one arm pull downs (doing both UEs separately) using GTB x 20 reps each  -T-band UE flexion (each UE separately) with PTB x 20 reps each      Home Exercises Provided and Patient Education Provided     Education provided:   - Patient educated on trying some supine chin tucks and rotations in the am before getting out of bed to improve joint lubrication prior to sitting up.     Written Home Exercises Provided: yes.  Exercises were reviewed and Liliana was able to demonstrate them prior to the end of the session.   Liliana demonstrated good  understanding of the education provided.     See EMR under Patient Instructions for exercises provided 07/02/2020.    Assessment     Pt with + response to dry needling. Improved sm pain with ROM ex first thing in morning.      Liliana is progressing well towards her goals.   Pt prognosis is Good.     Pt will continue to benefit from skilled outpatient physical therapy to address the deficits listed in the problem list box on initial evaluation, provide pt/family education and to maximize pt's level of independence in the home and community environment.     Pt's spiritual, cultural and educational needs considered and pt agreeable to plan of care and goals.     Anticipated barriers to physical therapy: none    Goals:     STG  Weeks/Visits Date Established  Goal Status    1.Pt will increase B SB AROM to >/= 42 deg to improve cervical mobility  4 weeks  6/15/2020    In Progress   2. Pt will report </= 2 incidents of night pain in the past week to improve sleep quality  4 weeks  6/15/2020    In Progress    3.Pt will report </= 18% disability on NDI to indicate improved participation and quality of life 4 weeks  6/15/2020    In Progress    4.Pt will report </= 7/10 for at worst pain to improve quality of life  4 weeks  6/15/2020    In Progress    5.Pt will demonstrate independence and compliance with HEP to improve therapy outcomes.  4 weeks  6/15/2020    In Progress       LTG Weeks/Visits Date Established  Goal Status    1. Pt will increase B cervical rotation to >/= 70 deg to improve ability to look over shoulder during driving  8 weeks  6/15/2020        2. Pt will report no incidents of night pain in the past week to improve sleep quality 8 weeks 6/15/2020           3.Pt will report </= 12% disability on NDI to indicate improved participation and quality of life 8 weeks 6/15/2020        4.Pt will report </= 3/10 for at worst pain to improve quality of life  8 weeks 6/15/2020        5. Pt will  increase BUE strength to >/= 4+/5 to improve ability to lift objects overhead  8 weeks 6/15/2020        Plan       Plan of care Certification: 6/15/2020 to 8/21/2020.      Outpatient Physical Therapy 2 times weekly for 6 weeks followed by 1x/wk for 2 weeks to include the following interventions: Cervical/Lumbar Traction, Electrical Stimulation TENS, Manual Therapy, Moist Heat/ Ice, Neuromuscular Re-ed, Patient Education, Therapeutic Activites, Therapeutic Exercise and Ultrasound. Pt may be seen by PTA as part of the rehabilitation team.         Harmony Zavaleta, PT

## 2020-08-04 ENCOUNTER — CLINICAL SUPPORT (OUTPATIENT)
Dept: REHABILITATION | Facility: HOSPITAL | Age: 75
End: 2020-08-04
Payer: MEDICARE

## 2020-08-04 DIAGNOSIS — M53.82 DECREASED RANGE OF MOTION OF INTERVERTEBRAL DISCS OF CERVICAL SPINE: ICD-10-CM

## 2020-08-04 DIAGNOSIS — R53.1 DECREASED STRENGTH: ICD-10-CM

## 2020-08-04 DIAGNOSIS — R52 TENDERNESS: ICD-10-CM

## 2020-08-04 PROCEDURE — 97140 MANUAL THERAPY 1/> REGIONS: CPT | Mod: PN

## 2020-08-04 PROCEDURE — 97110 THERAPEUTIC EXERCISES: CPT | Mod: PN

## 2020-08-04 NOTE — PROGRESS NOTES
"                               [facility] OUTPATIENT  Physical Therapy Daily Treatment Note     Name: Liliana Hairston  Clinic Number: [id number]    Therapy Diagnosis:   Encounter Diagnoses   Name Primary?    Decreased range of motion of intervertebral discs of cervical spine     Tenderness     Decreased strength      Physician: Bernice Olsen, DNP    Visit Date: 8/4/2020    Physician Orders: PT Eval and Treat  Medical Diagnosis from Referral: cervicalgia   Evaluation Date: 6/15/2020  Authorization Period Expiration: 12/31/2020  Current Certification Period: 6/15/2020 - 8/21/2020   Plan of Care Expiration: 8/21/2020  Visit # / Visits authorized: 14/ 104 (5/12 per POC)    Time In: 11:00 am  Time Out:  11:45 am  Total Billable Time:  45  minutes    Precautions: Standard    Subjective     Pt reports: "I think I'm getting better."  Doing AROM ex prior to getting out of bed in am helps reduce am pain.       She was compliant with home exercise program.  Response to previous treatment: Improving neck pain  Functional change: Able to turn head with less pain.     Pain: 2/10  Location: bilateral neck and upper traps  (with emphasis on left upper trap)    Objective     Liliana received therapeutic exercises to develop strength, endurance, ROM, flexibility and posture for 35  minutes including:    -UBE x 6 minutes  Level 1.8 alternating fwd and back   -scapula clocks with BUEs and RTB x 10 each side  - wall climbs with RTB x 10 reps  -Middle trap lift off RTB 1 x 10  -Scap retraction with ER RTB 2 x 10  -Shoulder ext GTB x20  -Shoulder rows GTB x 20  -Cervical 4 way isometrics YTB x 10 ea  - Upper Trap stretch 3 x 15 sec  - levator scap stretch 3 x 15 sec  -Seated chin tuck with towel  1 x 10 hold 5 sec      +Seated cervical rotation with towel for overpressure 1 x 10                                         -isometric cervical R rotation and L rotation with 5 second holds x 10 reps each                        "                                                                                                               Manual Therapy 10 min:  Following informed written consent pt received Integrative Dry Needling as follows:  Bilateral greater occipital TP's with .58p02dx needles   Bilateral C4-5 paraspinals with .41t82ki needles with IMS applied at 2.0mA x 5 min  Left upper trap TP with .50v33ao needle with visible muscle twitch  Pt tolerated treatment with no adverse reaction and voiced understanding of post needling instructions.     Home Exercises Provided and Patient Education Provided     Education provided:   - Patient educated on trying some supine chin tucks and rotations in the am before getting out of bed to improve joint lubrication prior to sitting up.     Written Home Exercises Provided: updated ex given today.  Exercises were reviewed and Liliana was able to demonstrate them prior to the end of the session.  Liliana demonstrated good  understanding of the education provided.     See EMR under Patient Instructions for exercises provided 08/04/2020.    Assessment     Pt with + response to dry needling. Improved am pain with ROM ex first thing in morning.      Liliana is progressing well towards her goals.   Pt prognosis is Good.     Pt will continue to benefit from skilled outpatient physical therapy to address the deficits listed in the problem list box on initial evaluation, provide pt/family education and to maximize pt's level of independence in the home and community environment.     Pt's spiritual, cultural and educational needs considered and pt agreeable to plan of care and goals.     Anticipated barriers to physical therapy: none    Goals:     STG  Weeks/Visits Date Established  Goal Status    1.Pt will increase B SB AROM to >/= 42 deg to improve cervical mobility  4 weeks  6/15/2020    In Progress   2. Pt will report </= 2 incidents of night pain in the past week to improve sleep quality  4 weeks   6/15/2020    In Progress    3.Pt will report </= 18% disability on NDI to indicate improved participation and quality of life 4 weeks  6/15/2020    In Progress    4.Pt will report </= 7/10 for at worst pain to improve quality of life  4 weeks  6/15/2020    In Progress    5.Pt will demonstrate independence and compliance with HEP to improve therapy outcomes.  4 weeks  6/15/2020    In Progress       LTG Weeks/Visits Date Established  Goal Status    1. Pt will increase B cervical rotation to >/= 70 deg to improve ability to look over shoulder during driving  8 weeks  6/15/2020        2. Pt will report no incidents of night pain in the past week to improve sleep quality 8 weeks 6/15/2020           3.Pt will report </= 12% disability on NDI to indicate improved participation and quality of life 8 weeks 6/15/2020        4.Pt will report </= 3/10 for at worst pain to improve quality of life  8 weeks 6/15/2020        5. Pt will increase BUE strength to >/= 4+/5 to improve ability to lift objects overhead  8 weeks 6/15/2020        Plan       Plan of care Certification: 6/15/2020 to 8/21/2020.      Outpatient Physical Therapy 2 times weekly for 6 weeks followed by 1x/wk for 2 weeks to include the following interventions: Cervical/Lumbar Traction, Electrical Stimulation TENS, Manual Therapy, Moist Heat/ Ice, Neuromuscular Re-ed, Patient Education, Therapeutic Activites, Therapeutic Exercise and Ultrasound. Pt may be seen by PTA as part of the rehabilitation team.     Harmony Zavaleta, PT

## 2020-08-06 ENCOUNTER — CLINICAL SUPPORT (OUTPATIENT)
Dept: REHABILITATION | Facility: HOSPITAL | Age: 75
End: 2020-08-06
Payer: MEDICARE

## 2020-08-06 ENCOUNTER — DOCUMENTATION ONLY (OUTPATIENT)
Dept: REHABILITATION | Facility: HOSPITAL | Age: 75
End: 2020-08-06

## 2020-08-06 DIAGNOSIS — R53.1 DECREASED STRENGTH: ICD-10-CM

## 2020-08-06 DIAGNOSIS — M53.82 DECREASED RANGE OF MOTION OF INTERVERTEBRAL DISCS OF CERVICAL SPINE: ICD-10-CM

## 2020-08-06 DIAGNOSIS — R52 TENDERNESS: ICD-10-CM

## 2020-08-06 PROCEDURE — 97112 NEUROMUSCULAR REEDUCATION: CPT | Mod: PN,CQ

## 2020-08-06 PROCEDURE — 97110 THERAPEUTIC EXERCISES: CPT | Mod: PN,CQ

## 2020-08-06 NOTE — PROGRESS NOTES
[facility] OUTPATIENT  Physical Therapy Daily Treatment Note     Name: Liliana Hairston  Clinic Number: [id number]    Therapy Diagnosis:   Encounter Diagnoses   Name Primary?    Decreased range of motion of intervertebral discs of cervical spine     Tenderness     Decreased strength      Physician: Bernice Olsen, PARAG    Visit Date: 8/6/2020    Physician Orders: PT Eval and Treat  Medical Diagnosis from Referral: cervicalgia   Evaluation Date: 6/15/2020  Authorization Period Expiration: 12/31/2020  Current Certification Period: 6/15/2020 - 8/21/2020   Plan of Care Expiration: 8/21/2020  Visit # / Visits authorized: 15/ 104 (6/12 per POC)    Time In: 0916am  Time Out:  1000 am  Total Billable Time:  44  minutes    Precautions: Standard    Subjective     Pt reports: she reports she is sleeping better      She was compliant with home exercise program.  Response to previous treatment: Improving neck pain  Functional change: Able to turn head with less pain.     Pain: 0/10  Location: bilateral neck and upper traps  (with emphasis on left upper trap)    Objective     Liliana received therapeutic exercises to develop strength, endurance, ROM, flexibility and posture for 29 minutes including:    -UBE x 6 minutes  Level 1.8 alternating fwd and back     -scapula clocks with BUEs and RTB x 10 each side  - wall climbs with RTB x 10 reps  -Middle trap lift off RTB 1 x 10  -Scap retraction with ER RTB 2 x 10  -Shoulder ext GTB x20  -Shoulder rows GTB x 20  -Cervical 4 way isometrics YTB x 10 ea  - Upper Trap stretch 3 x 15 sec  - levator scap stretch 3 x 15 sec    NMRE x 15 minutes:  -Seated chin tuck with towel  1 x 10 hold 5 sec      +Seated cervical rotation with towel for overpressure 1 x 10                                           -isometric cervical R rotation and L rotation with 5 second holds x 10 reps each        NOT PERFORMED:  Manual Therapy 10 min:  Following informed  written consent pt received Integrative Dry Needling as follows:  Bilateral greater occipital TP's with .20b02jr needles   Bilateral C4-5 paraspinals with .65d61tt needles with IMS applied at 2.0mA x 5 min  Left upper trap TP with .07w19jc needle with visible muscle twitch  Pt tolerated treatment with no adverse reaction and voiced understanding of post needling instructions.     Home Exercises Provided and Patient Education Provided     Education provided:   - Patient educated on trying some supine chin tucks and rotations in the am before getting out of bed to improve joint lubrication prior to sitting up.     Written Home Exercises Provided: updated ex given today.  Exercises were reviewed and Liliana was able to demonstrate them prior to the end of the session.  Liliana demonstrated good  understanding of the education provided.     See EMR under Patient Instructions for exercises provided 08/04/2020.    Assessment     Pt with + response to dry needling. Improved am pain with ROM ex first thing in morning.      Liliana is progressing well towards her goals.   Pt prognosis is Good.     Pt will continue to benefit from skilled outpatient physical therapy to address the deficits listed in the problem list box on initial evaluation, provide pt/family education and to maximize pt's level of independence in the home and community environment.     Pt's spiritual, cultural and educational needs considered and pt agreeable to plan of care and goals.     Anticipated barriers to physical therapy: none    Goals:     STG  Weeks/Visits Date Established  Goal Status    1.Pt will increase B SB AROM to >/= 42 deg to improve cervical mobility  4 weeks  6/15/2020    In Progress   2. Pt will report </= 2 incidents of night pain in the past week to improve sleep quality  4 weeks  6/15/2020    In Progress    3.Pt will report </= 18% disability on NDI to indicate improved participation and quality of life 4 weeks  6/15/2020    In Progress     4.Pt will report </= 7/10 for at worst pain to improve quality of life  4 weeks  6/15/2020    In Progress    5.Pt will demonstrate independence and compliance with HEP to improve therapy outcomes.  4 weeks  6/15/2020    In Progress       LTG Weeks/Visits Date Established  Goal Status    1. Pt will increase B cervical rotation to >/= 70 deg to improve ability to look over shoulder during driving  8 weeks  6/15/2020        2. Pt will report no incidents of night pain in the past week to improve sleep quality 8 weeks 6/15/2020           3.Pt will report </= 12% disability on NDI to indicate improved participation and quality of life 8 weeks 6/15/2020        4.Pt will report </= 3/10 for at worst pain to improve quality of life  8 weeks 6/15/2020        5. Pt will increase BUE strength to >/= 4+/5 to improve ability to lift objects overhead  8 weeks 6/15/2020        Plan       Plan of care Certification: 6/15/2020 to 8/21/2020.      Outpatient Physical Therapy 2 times weekly for 6 weeks followed by 1x/wk for 2 weeks to include the following interventions: Cervical/Lumbar Traction, Electrical Stimulation TENS, Manual Therapy, Moist Heat/ Ice, Neuromuscular Re-ed, Patient Education, Therapeutic Activites, Therapeutic Exercise and Ultrasound. Pt may be seen by PTA as part of the rehabilitation team.     Harmony Zavaleta, PT

## 2020-08-06 NOTE — PROGRESS NOTES
30 day visit PT-PTA face-face discussion with CLIFFORD Zavaleta re: patient status, POC, and plan for progression done 8/6/20.  Diane Allen, PTA

## 2020-08-11 ENCOUNTER — CLINICAL SUPPORT (OUTPATIENT)
Dept: REHABILITATION | Facility: HOSPITAL | Age: 75
End: 2020-08-11
Payer: MEDICARE

## 2020-08-11 DIAGNOSIS — R53.1 DECREASED STRENGTH: ICD-10-CM

## 2020-08-11 DIAGNOSIS — R52 TENDERNESS: ICD-10-CM

## 2020-08-11 DIAGNOSIS — M53.82 DECREASED RANGE OF MOTION OF INTERVERTEBRAL DISCS OF CERVICAL SPINE: ICD-10-CM

## 2020-08-11 PROCEDURE — 97110 THERAPEUTIC EXERCISES: CPT | Mod: PN

## 2020-08-11 PROCEDURE — 97140 MANUAL THERAPY 1/> REGIONS: CPT | Mod: PN

## 2020-08-11 NOTE — PROGRESS NOTES
[facility] OUTPATIENT  Physical Therapy Daily Treatment Note     Name: Liliana Hairston  Clinic Number: [id number]    Therapy Diagnosis:   Encounter Diagnoses   Name Primary?    Decreased range of motion of intervertebral discs of cervical spine     Tenderness     Decreased strength      Physician: Bernice Olsen, PARAG    Visit Date: 8/11/2020    Physician Orders: PT Eval and Treat  Medical Diagnosis from Referral: cervicalgia   Evaluation Date: 6/15/2020  Authorization Period Expiration: 12/31/2020  Current Certification Period: 6/15/2020 - 8/21/2020   Plan of Care Expiration: 8/21/2020  Visit # / Visits authorized: 15/ 104 (7/12 per POC)    Time In: 11:00 am  Time Out:  11:47 am  Total Billable Time: 47   minutes    Precautions: Standard    Subjective     Pt reports: she reports she is sleeping better      She was compliant with home exercise program.  Response to previous treatment: Improving neck pain  Functional change: Able to turn head with less pain.     Pain: 2/10  Location: Left neck and upper trap    Objective     Liliana received therapeutic exercises to develop strength, endurance, ROM, flexibility and posture for 37 minutes including:    -UBE x 6 minutes  Level 1.8 alternating fwd and back     -scapula clocks with BUEs and RTB x 10 each side  - wall climbs with RTB x 10 reps  -Middle trap lift off RTB 1 x 10  -Scap retraction with ER RTB 2 x 10  -Shoulder ext GTB x20  -Shoulder rows GTB x 20  -Cervical 4 way isometrics YTB x 15 ea  - Upper Trap stretch 3 x 15 sec  - levator scap stretch 3 x 15 sec    NMRE x 15 minutes:  -Seated chin tuck with towel  1 x 10 hold 5 sec      -Seated cervical rotation with towel for overpressure 1 x 10                                           -isometric cervical R rotation and L rotation with 5 second holds x 10 reps each        Manual Therapy 10 min:  Following informed written consent pt received Integrative Dry Needling as  follows:  Bilateral greater occipital TP's with .99r21kw needles   Bilateral C4-5 paraspinals with .58y73yf needles with IMS applied at 2.0mA x 5 min  Left upper trap TP with .32m08zg needle with IMS applied at 2.0mA x 5 min  Pt tolerated treatment with no adverse reaction and voiced understanding of post needling instructions.     Home Exercises Provided and Patient Education Provided     Education provided:   - Patient educated on trying some supine chin tucks and rotations in the am before getting out of bed to improve joint lubrication prior to sitting up.     Written Home Exercises Provided: updated ex given today.  Exercises were reviewed and Liliana was able to demonstrate them prior to the end of the session.  Liliana demonstrated good  understanding of the education provided.     See EMR under Patient Instructions for exercises provided 08/04/2020.    Assessment     Pt demonstrates improved AROM and strength.     Liliana is progressing well towards her goals.   Pt prognosis is Good.     Pt will continue to benefit from skilled outpatient physical therapy to address the deficits listed in the problem list box on initial evaluation, provide pt/family education and to maximize pt's level of independence in the home and community environment.     Pt's spiritual, cultural and educational needs considered and pt agreeable to plan of care and goals.     Anticipated barriers to physical therapy: none    Goals:     STG  Weeks/Visits Date Established  Goal Status    1.Pt will increase B SB AROM to >/= 42 deg to improve cervical mobility  4 weeks  6/15/2020    In Progress   2. Pt will report </= 2 incidents of night pain in the past week to improve sleep quality  4 weeks  6/15/2020    Met 8/11/2020   3.Pt will report </= 18% disability on NDI to indicate improved participation and quality of life 4 weeks  6/15/2020    In Progress    4.Pt will report </= 7/10 for at worst pain to improve quality of life  4 weeks  6/15/2020     Met 8/11/2020    5.Pt will demonstrate independence and compliance with HEP to improve therapy outcomes.  4 weeks  6/15/2020    In Progress       LTG Weeks/Visits Date Established  Goal Status    1. Pt will increase B cervical rotation to >/= 70 deg to improve ability to look over shoulder during driving  8 weeks  6/15/2020        2. Pt will report no incidents of night pain in the past week to improve sleep quality 8 weeks 6/15/2020           3.Pt will report </= 12% disability on NDI to indicate improved participation and quality of life 8 weeks 6/15/2020        4.Pt will report </= 3/10 for at worst pain to improve quality of life  8 weeks 6/15/2020        5. Pt will increase BUE strength to >/= 4+/5 to improve ability to lift objects overhead  8 weeks 6/15/2020        Plan   Re-assess next visit    Plan of care Certification: 6/15/2020 to 8/21/2020.      Outpatient Physical Therapy 2 times weekly for 6 weeks followed by 1x/wk for 2 weeks to include the following interventions: Cervical/Lumbar Traction, Electrical Stimulation TENS, Manual Therapy, Moist Heat/ Ice, Neuromuscular Re-ed, Patient Education, Therapeutic Activites, Therapeutic Exercise and Ultrasound. Pt may be seen by PTA as part of the rehabilitation team.     Harmony Zavaleta, PT

## 2020-08-13 ENCOUNTER — CLINICAL SUPPORT (OUTPATIENT)
Dept: REHABILITATION | Facility: HOSPITAL | Age: 75
End: 2020-08-13
Payer: MEDICARE

## 2020-08-13 DIAGNOSIS — M53.82 DECREASED RANGE OF MOTION OF INTERVERTEBRAL DISCS OF CERVICAL SPINE: ICD-10-CM

## 2020-08-13 DIAGNOSIS — R53.1 DECREASED STRENGTH: ICD-10-CM

## 2020-08-13 DIAGNOSIS — R52 TENDERNESS: ICD-10-CM

## 2020-08-13 PROCEDURE — 97110 THERAPEUTIC EXERCISES: CPT | Mod: PN

## 2020-08-13 PROCEDURE — 97140 MANUAL THERAPY 1/> REGIONS: CPT | Mod: PN

## 2020-08-13 NOTE — PROGRESS NOTES
[facility] OUTPATIENT  Physical Therapy Daily Treatment Note     Name: Liliana Hairston  Clinic Number: [id number]    Therapy Diagnosis:   Encounter Diagnoses   Name Primary?    Decreased range of motion of intervertebral discs of cervical spine     Tenderness     Decreased strength      Physician: Bernice Olsen, PARAG    Visit Date: 8/13/2020    Physician Orders: PT Eval and Treat  Medical Diagnosis from Referral: cervicalgia   Evaluation Date: 6/15/2020  Authorization Period Expiration: 12/31/2020  Current Certification Period: 6/15/2020 - 8/21/2020   Plan of Care Expiration: 8/21/2020  Visit # / Visits authorized: 16/ 104 (8/12 per POC)    Time In: 9:50 am  Time Out:  10:30 am  Total Billable Time:  40 minutes    Precautions: Standard    Subjective     Pt reports: she reports improved left upper trap symptoms following last treatment     She was compliant with home exercise program.  Response to previous treatment: Improving left UT pain and tightness  Functional change: Able to work in garden without pain provocation    Pain: 2/10  Location: Left neck and upper trap    Objective     Liliana received therapeutic exercises to develop strength, endurance, ROM, flexibility and posture for 25 minutes including:    -UBE x 6 minutes  Level 2.0 alternating fwd and back     -scapula clocks with BUEs and GTB x 10 each side  - wall climbs with GTB x 10 reps  -Middle trap lift off GTB 1 x 10  -Scap retraction with ER GTB 2 x 10  -Shoulder ext GTB x20  -Shoulder rows GTB x 20  -Cervical 4 way isometrics YTB x 15 ea NP  - Upper Trap stretch 3 x 15 sec  - levator scap stretch 3 x 15 sec    NMRE x 5 minutes:  -Seated chin tuck with towel  1 x 10 hold 5 sec      -Seated cervical rotation with towel for overpressure 1 x 10                                           -isometric cervical R rotation and L rotation with 5 second holds x 10 reps each NP       Manual Therapy 10  min:  Following informed written consent pt received Integrative Dry Needling as follows:  Bilateral C5 paraspinals with .[id number] needles with IMS applied at 2.0mA x 5 min  Left upper trap TP with .10m55om needle with IMS applied at 2.0mA x 5 min  Pt tolerated treatment with no adverse reaction and voiced understanding of post needling instructions.     Home Exercises Provided and Patient Education Provided     Education provided:   - Patient educated on trying some supine chin tucks and rotations in the am before getting out of bed to improve joint lubrication prior to sitting up.     Written Home Exercises Provided: updated ex given today.  Exercises were reviewed and Liliana was able to demonstrate them prior to the end of the session.  Liliana demonstrated good  understanding of the education provided.     See EMR under Patient Instructions for exercises provided 08/04/2020.    Assessment     Pt demonstrates improved AROM and strength. Improving left UT symptoms with dry needling using IMS to UT. Able to increase resistance to GTB for all scapular ex    Liliana is progressing well towards her goals.   Pt prognosis is Good.     Pt will continue to benefit from skilled outpatient physical therapy to address the deficits listed in the problem list box on initial evaluation, provide pt/family education and to maximize pt's level of independence in the home and community environment.     Pt's spiritual, cultural and educational needs considered and pt agreeable to plan of care and goals.     Anticipated barriers to physical therapy: none    Goals:     STG  Weeks/Visits Date Established  Goal Status    1.Pt will increase B SB AROM to >/= 42 deg to improve cervical mobility  4 weeks  6/15/2020    In Progress   2. Pt will report </= 2 incidents of night pain in the past week to improve sleep quality  4 weeks  6/15/2020    Met 8/11/2020   3.Pt will report </= 18% disability on NDI to indicate improved participation and quality  of life 4 weeks  6/15/2020    In Progress    4.Pt will report </= 7/10 for at worst pain to improve quality of life  4 weeks  6/15/2020    Met 8/11/2020    5.Pt will demonstrate independence and compliance with HEP to improve therapy outcomes.  4 weeks  6/15/2020    In Progress       LTG Weeks/Visits Date Established  Goal Status    1. Pt will increase B cervical rotation to >/= 70 deg to improve ability to look over shoulder during driving  8 weeks  6/15/2020     Met 8/11/2020   2. Pt will report no incidents of night pain in the past week to improve sleep quality 8 weeks 6/15/2020           3.Pt will report </= 12% disability on NDI to indicate improved participation and quality of life 8 weeks 6/15/2020        4.Pt will report </= 3/10 for at worst pain to improve quality of life  8 weeks 6/15/2020        5. Pt will increase BUE strength to >/= 4+/5 to improve ability to lift objects overhead  8 weeks 6/15/2020        Plan     Plan of care Certification: 6/15/2020 to 8/21/2020.      Outpatient Physical Therapy 2 times weekly for 6 weeks followed by 1x/wk for 2 weeks to include the following interventions: Cervical/Lumbar Traction, Electrical Stimulation TENS, Manual Therapy, Moist Heat/ Ice, Neuromuscular Re-ed, Patient Education, Therapeutic Activites, Therapeutic Exercise and Ultrasound. Pt may be seen by PTA as part of the rehabilitation team.     Harmony Zavaleta, PT

## 2020-08-18 ENCOUNTER — CLINICAL SUPPORT (OUTPATIENT)
Dept: REHABILITATION | Facility: HOSPITAL | Age: 75
End: 2020-08-18
Payer: MEDICARE

## 2020-08-18 DIAGNOSIS — R52 TENDERNESS: ICD-10-CM

## 2020-08-18 DIAGNOSIS — R53.1 DECREASED STRENGTH: ICD-10-CM

## 2020-08-18 DIAGNOSIS — M53.82 DECREASED RANGE OF MOTION OF INTERVERTEBRAL DISCS OF CERVICAL SPINE: ICD-10-CM

## 2020-08-18 PROCEDURE — 97110 THERAPEUTIC EXERCISES: CPT | Mod: PN

## 2020-08-18 PROCEDURE — 97140 MANUAL THERAPY 1/> REGIONS: CPT | Mod: PN

## 2020-08-18 NOTE — PROGRESS NOTES
[facility]  Physical Therapy Daily Treatment Note     Name: Liliana Hairston  Clinic Number: [id number]    Therapy Diagnosis:   Encounter Diagnoses   Name Primary?    Decreased range of motion of intervertebral discs of cervical spine     Tenderness     Decreased strength      Physician: Bernice Olsen, PARAG    Visit Date: 8/18/2020    Physician Orders: PT Eval and Treat  Medical Diagnosis from Referral: cervicalgia   Evaluation Date: 6/15/2020  Authorization Period Expiration: 12/31/2020  Current Certification Period: 6/15/2020 - 8/21/2020   Plan of Care Expiration: 8/21/2020  Visit # / Visits authorized: 17/ 104 (9/12 per POC)    Time In: 9:32 am  Time Out:  10:15 am  Total Billable Time:  43 minutes    Precautions: Standard    Subjective     Pt reports: Needling is helping the muscle tightness and pain.   She was compliant with home exercise program.  Response to previous treatment: Improving left UT pain and tightness  Functional change: Able to work in garden without pain provocation    Pain: 2/10  Location: Left neck and upper trap    Objective     Liliana received therapeutic exercises to develop strength, endurance, ROM, flexibility and posture for 25 minutes including:    -UBE x 6 minutes  Level 2.0 alternating fwd and back     -scapula clocks with BUEs and GTB x 10 each side  - wall climbs with GTB x 10 reps  -Middle trap lift off GTB 1 x 10  -Scap retraction with ER GTB 2 x 10  +CC: Shoulder extension and rows 3#   x20  -Cervical 4 way isometrics YTB x 15 ea   - Upper Trap stretch 3 x 15 sec  - levator scap stretch 3 x 15 sec      NMRE x 5 minutes:  -Seated chin tuck with towel  1 x 10 hold 5 sec      -Seated cervical rotation with towel for overpressure 1 x 10                                     -isometric cervical R rotation and L rotation with 5 second holds x 10 reps each       Manual Therapy 10 min:  Following informed written consent pt  received Integrative Dry Needling as follows:  Bilateral C5 paraspinals with .[id number] needles with IMS applied at 2.0mA x 5 min  Left upper trap TP with .53i08vc needle with IMS applied at 2.0mA x 5 min  Pt tolerated treatment with no adverse reaction and voiced understanding of post needling instructions.     Home Exercises Provided and Patient Education Provided     Education provided:   - Continue HEP  -Postural awareness throughout the day    Written Home Exercises Provided: updated ex given today.  Exercises were reviewed and Liliana was able to demonstrate them prior to the end of the session.  Liliana demonstrated good  understanding of the education provided.     See EMR under Patient Instructions for exercises provided 08/04/2020.    Assessment     Pt demonstrates improved AROM and strength. Improving left UT symptoms with dry needling using IMS to UT. Able to progress to cable resistance for scapular strengthening.     Liliana is progressing well towards her goals.   Pt prognosis is Good.     Pt will continue to benefit from skilled outpatient physical therapy to address the deficits listed in the problem list box on initial evaluation, provide pt/family education and to maximize pt's level of independence in the home and community environment.     Pt's spiritual, cultural and educational needs considered and pt agreeable to plan of care and goals.     Anticipated barriers to physical therapy: none    Goals:     STG  Weeks/Visits Date Established  Goal Status    1.Pt will increase B SB AROM to >/= 42 deg to improve cervical mobility  4 weeks  6/15/2020    In Progress   2. Pt will report </= 2 incidents of night pain in the past week to improve sleep quality  4 weeks  6/15/2020    Met 8/11/2020   3.Pt will report </= 18% disability on NDI to indicate improved participation and quality of life 4 weeks  6/15/2020    In Progress    4.Pt will report </= 7/10 for at worst pain to improve quality of life  4 weeks   6/15/2020    Met 8/11/2020    5.Pt will demonstrate independence and compliance with HEP to improve therapy outcomes.  4 weeks  6/15/2020    In Progress       LTG Weeks/Visits Date Established  Goal Status    1. Pt will increase B cervical rotation to >/= 70 deg to improve ability to look over shoulder during driving  8 weeks  6/15/2020     Met 8/11/2020   2. Pt will report no incidents of night pain in the past week to improve sleep quality 8 weeks 6/15/2020           3.Pt will report </= 12% disability on NDI to indicate improved participation and quality of life 8 weeks 6/15/2020        4.Pt will report </= 3/10 for at worst pain to improve quality of life  8 weeks 6/15/2020        5. Pt will increase BUE strength to >/= 4+/5 to improve ability to lift objects overhead  8 weeks 6/15/2020        Plan     Plan of care Certification: 6/15/2020 to 8/21/2020.      Outpatient Physical Therapy 2 times weekly for 6 weeks followed by 1x/wk for 2 weeks to include the following interventions: Cervical/Lumbar Traction, Electrical Stimulation TENS, Manual Therapy, Moist Heat/ Ice, Neuromuscular Re-ed, Patient Education, Therapeutic Activites, Therapeutic Exercise and Ultrasound. Pt may be seen by PTA as part of the rehabilitation team.     Harmony Zavaleta, PT

## 2020-08-20 ENCOUNTER — CLINICAL SUPPORT (OUTPATIENT)
Dept: REHABILITATION | Facility: HOSPITAL | Age: 75
End: 2020-08-20
Payer: MEDICARE

## 2020-08-20 DIAGNOSIS — M53.82 DECREASED RANGE OF MOTION OF INTERVERTEBRAL DISCS OF CERVICAL SPINE: ICD-10-CM

## 2020-08-20 DIAGNOSIS — R52 TENDERNESS: ICD-10-CM

## 2020-08-20 DIAGNOSIS — R53.1 DECREASED STRENGTH: ICD-10-CM

## 2020-08-20 PROCEDURE — 97140 MANUAL THERAPY 1/> REGIONS: CPT | Mod: KX,PN

## 2020-08-20 PROCEDURE — 97110 THERAPEUTIC EXERCISES: CPT | Mod: KX,PN

## 2020-08-20 NOTE — PROGRESS NOTES
[facility] OUTPATIENT  Physical Therapy Daily Treatment/Discharge Note     Name: Liliana Hairston  Clinic Number: [id number]    Therapy Diagnosis:   Encounter Diagnoses   Name Primary?    Decreased range of motion of intervertebral discs of cervical spine     Tenderness     Decreased strength      Physician: Bernice Olsen, DNP    Visit Date: 8/20/2020    Physician Orders: PT Eval and Treat  Medical Diagnosis from Referral: cervicalgia   Evaluation Date: 6/15/2020  Authorization Period Expiration: 12/31/2020  Current Certification Period: 6/15/2020 - 8/21/2020   Plan of Care Expiration: 8/21/2020  Visit # / Visits authorized: 18/ 104 (10/12 per POC)  Total visits: 18  No show/Cancelled: 0/4     Time In: 9:05 am  Time Out: 10:46 am  Total Billable Time:  41 minutes    Precautions: Standard    Subjective     Pt reports: Left upper trap is sore today from needling Tuesday. Pt states overall improvement in symptoms since beginning PT.   She was compliant with home exercise program.  Response to previous treatment: Sore upper trap  Functional change: Able to work in garden without pain provocation; sleep is now uninterrupted by pain    Pain: 2/10  Location: Left neck and upper trap    Objective     Cervical AROM (Degrees 6/15/2020) AROM   (Degrees) 820/2020 Comments   Flexion 50  60    Extension 45 53      Right Side Bend 35 37  Mild pull on L   Left Side Bend 35 40     Right Rotation 65 70     Left Rotation 65 70       MMT:  All cervical and BUE strength grades 4+/5 - 5/5      Liliana received therapeutic exercises to develop strength, endurance, ROM, flexibility and posture for 21 minutes including:    -UBE x 6 minutes  Level 2.0 alternating fwd and back     -scapula clocks with BUEs and GTB x 10 each side  - wall climbs with GTB x 10 reps  -Middle trap lift off GTB 1 x 10  -Scap retraction with ER GTB 2 x 10  - Shoulder extension and rows GTB  x20 ea  +Doorway pec  stretch  -Cervical 4 way isometrics YTB x 15 ea   - Upper Trap stretch 3 x 15 sec  - levator scap stretch 3 x 15 sec      NMRE x 5 minutes:  -Seated chin tuck with towel  1 x 10 hold 5 sec      -Seated cervical rotation with towel for overpressure 1 x 10                                     -isometric cervical R rotation and L rotation with 5 second holds x 10 reps each       Manual Therapy 10 min:  STM cervical and upper trap mm  Sub-occipital release  Pt educated in skin rolling technique for 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     Home Exercises Provided and Patient Education Provided    Pt given GTB and updated HEP    Education provided:   - Continue HEP at least 3x/wk for maintenence  -Postural awareness throughout the day    Written Home Exercises Provided: updated ex given today.  Exercises were reviewed and Liliana was able to demonstrate them prior to the end of the session.  Liliana demonstrated good  understanding of the education provided.     See EMR under Patient Instructions for exercises provided 08/20/2020    Assessment     Pt demonstrates improved AROM and strength. Improved left UT symptoms with dry needling using IMS to UT.   Pt has met 3/5 STG and 4/5 LTG set at eval. She is independent in HEP and should continue 3x/week for management of chronic cervical pain.      Pt prognosis is Good.     Pt will continue to benefit from skilled outpatient physical therapy to address the deficits listed in the problem list box on initial evaluation, provide pt/family education and to maximize pt's level of independence in the home and community environment.     Pt's spiritual, cultural and educational needs considered and pt agreeable to plan of care and goals.     Anticipated barriers to physical therapy: none    Goals:     STG  Weeks/Visits Date Established  Goal Status    1.Pt will increase B SB AROM to >/= 42 deg to improve cervical mobility  4 weeks  6/15/2020    In Progress   2. Pt will report  </= 2 incidents of night pain in the past week to improve sleep quality  4 weeks  6/15/2020    Met 8/11/2020   3.Pt will report </= 18% disability on NDI to indicate improved participation and quality of life 4 weeks  6/15/2020    In Progress    4.Pt will report </= 7/10 for at worst pain to improve quality of life  4 weeks  6/15/2020    Met 8/11/2020    5.Pt will demonstrate independence and compliance with HEP to improve therapy outcomes.  4 weeks  6/15/2020    Met 8/20/2020      LTG Weeks/Visits Date Established  Goal Status    1. Pt will increase B cervical rotation to >/= 70 deg to improve ability to look over shoulder during driving  8 weeks  6/15/2020     Met 8/11/2020   2. Pt will report no incidents of night pain in the past week to improve sleep quality 8 weeks 6/15/2020       Met 8/20/2020    3.Pt will report </= 12% disability on NDI to indicate improved participation and quality of life 8 weeks 6/15/2020    Not met    4.Pt will report </= 3/10 for at worst pain to improve quality of life  8 weeks 6/15/2020    Met 8/20/2020    5. Pt will increase BUE strength to >/= 4+/5 to improve ability to lift objects overhead  8 weeks 6/15/2020 Met 8/20/2020       Plan     Discharge PT    Harmony Zavaleta, PT

## 2020-10-08 ENCOUNTER — LAB VISIT (OUTPATIENT)
Dept: LAB | Facility: HOSPITAL | Age: 75
End: 2020-10-08
Attending: INTERNAL MEDICINE
Payer: MEDICARE

## 2020-10-08 DIAGNOSIS — Z85.3 HISTORY OF BREAST CANCER: ICD-10-CM

## 2020-10-08 LAB
ALBUMIN SERPL BCP-MCNC: 4.1 G/DL (ref 3.5–5.2)
ALP SERPL-CCNC: 73 U/L (ref 55–135)
ALT SERPL W/O P-5'-P-CCNC: 17 U/L (ref 10–44)
ANION GAP SERPL CALC-SCNC: 11 MMOL/L (ref 8–16)
AST SERPL-CCNC: 21 U/L (ref 10–40)
BASOPHILS # BLD AUTO: 0.04 K/UL (ref 0–0.2)
BASOPHILS NFR BLD: 0.6 % (ref 0–1.9)
BILIRUB SERPL-MCNC: 0.4 MG/DL (ref 0.1–1)
BUN SERPL-MCNC: 10 MG/DL (ref 8–23)
CALCIUM SERPL-MCNC: 9.4 MG/DL (ref 8.7–10.5)
CEA SERPL-MCNC: 0.9 NG/ML (ref 0–5)
CHLORIDE SERPL-SCNC: 104 MMOL/L (ref 95–110)
CO2 SERPL-SCNC: 25 MMOL/L (ref 23–29)
CREAT SERPL-MCNC: 0.5 MG/DL (ref 0.5–1.4)
DIFFERENTIAL METHOD: ABNORMAL
EOSINOPHIL # BLD AUTO: 0.1 K/UL (ref 0–0.5)
EOSINOPHIL NFR BLD: 1.2 % (ref 0–8)
ERYTHROCYTE [DISTWIDTH] IN BLOOD BY AUTOMATED COUNT: 12.2 % (ref 11.5–14.5)
EST. GFR  (AFRICAN AMERICAN): >60 ML/MIN/1.73 M^2
EST. GFR  (NON AFRICAN AMERICAN): >60 ML/MIN/1.73 M^2
GLUCOSE SERPL-MCNC: 104 MG/DL (ref 70–110)
HCT VFR BLD AUTO: 41.1 % (ref 37–48.5)
HGB BLD-MCNC: 13.8 G/DL (ref 12–16)
IMM GRANULOCYTES # BLD AUTO: 0.01 K/UL (ref 0–0.04)
IMM GRANULOCYTES NFR BLD AUTO: 0.1 % (ref 0–0.5)
LYMPHOCYTES # BLD AUTO: 1.7 K/UL (ref 1–4.8)
LYMPHOCYTES NFR BLD: 24.6 % (ref 18–48)
MCH RBC QN AUTO: 31.8 PG (ref 27–31)
MCHC RBC AUTO-ENTMCNC: 33.6 G/DL (ref 32–36)
MCV RBC AUTO: 95 FL (ref 82–98)
MONOCYTES # BLD AUTO: 0.7 K/UL (ref 0.3–1)
MONOCYTES NFR BLD: 9.4 % (ref 4–15)
NEUTROPHILS # BLD AUTO: 4.4 K/UL (ref 1.8–7.7)
NEUTROPHILS NFR BLD: 64.1 % (ref 38–73)
NRBC BLD-RTO: 0 /100 WBC
PLATELET # BLD AUTO: 307 K/UL (ref 150–350)
PMV BLD AUTO: 9.2 FL (ref 9.2–12.9)
POTASSIUM SERPL-SCNC: 4.3 MMOL/L (ref 3.5–5.1)
PROT SERPL-MCNC: 6.9 G/DL (ref 6–8.4)
RBC # BLD AUTO: 4.34 M/UL (ref 4–5.4)
SODIUM SERPL-SCNC: 140 MMOL/L (ref 136–145)
WBC # BLD AUTO: 6.92 K/UL (ref 3.9–12.7)

## 2020-10-08 PROCEDURE — 86300 IMMUNOASSAY TUMOR CA 15-3: CPT

## 2020-10-08 PROCEDURE — 36415 COLL VENOUS BLD VENIPUNCTURE: CPT

## 2020-10-08 PROCEDURE — 82378 CARCINOEMBRYONIC ANTIGEN: CPT

## 2020-10-08 PROCEDURE — 80053 COMPREHEN METABOLIC PANEL: CPT

## 2020-10-08 PROCEDURE — 85025 COMPLETE CBC W/AUTO DIFF WBC: CPT

## 2020-10-08 PROCEDURE — 86300 IMMUNOASSAY TUMOR CA 15-3: CPT | Mod: 91

## 2020-10-09 LAB
CANCER AG15-3 SERPL-ACNC: 21.4 U/ML (ref 0–25)
CANCER AG27-29 SERPL-ACNC: 34 U/ML (ref 0–38.6)

## 2020-10-13 PROBLEM — Z13.820 SCREENING FOR OSTEOPOROSIS: Status: ACTIVE | Noted: 2020-10-13

## 2020-10-13 PROBLEM — Z79.811 LONG TERM (CURRENT) USE OF AROMATASE INHIBITORS: Status: ACTIVE | Noted: 2020-10-13

## 2020-10-14 ENCOUNTER — HOSPITAL ENCOUNTER (OUTPATIENT)
Dept: RADIOLOGY | Facility: HOSPITAL | Age: 75
Discharge: HOME | End: 2020-10-14
Attending: INTERNAL MEDICINE
Payer: MEDICARE

## 2020-10-14 DIAGNOSIS — Z79.811 LONG TERM (CURRENT) USE OF AROMATASE INHIBITORS: ICD-10-CM

## 2020-10-14 DIAGNOSIS — Z13.820 SCREENING FOR OSTEOPOROSIS: ICD-10-CM

## 2020-10-14 DIAGNOSIS — Z85.3 HISTORY OF BREAST CANCER: ICD-10-CM

## 2020-10-14 PROCEDURE — 77080 DXA BONE DENSITY AXIAL: CPT | Mod: TC,PO

## 2020-10-20 ENCOUNTER — LAB VISIT (OUTPATIENT)
Dept: LAB | Facility: HOSPITAL | Age: 75
End: 2020-10-20
Attending: FAMILY MEDICINE
Payer: MEDICARE

## 2020-10-20 DIAGNOSIS — E78.2 MIXED DYSLIPIDEMIA: ICD-10-CM

## 2020-10-20 LAB
CHOLEST SERPL-MCNC: 181 MG/DL (ref 120–199)
CHOLEST/HDLC SERPL: 3.4 {RATIO} (ref 2–5)
HDLC SERPL-MCNC: 54 MG/DL (ref 40–75)
HDLC SERPL: 29.8 % (ref 20–50)
LDLC SERPL CALC-MCNC: 83.8 MG/DL (ref 63–159)
NONHDLC SERPL-MCNC: 127 MG/DL
TRIGL SERPL-MCNC: 216 MG/DL (ref 30–150)

## 2020-10-20 PROCEDURE — 80061 LIPID PANEL: CPT

## 2020-10-20 PROCEDURE — 36415 COLL VENOUS BLD VENIPUNCTURE: CPT | Mod: PO

## 2020-10-26 ENCOUNTER — OFFICE VISIT (OUTPATIENT)
Dept: FAMILY MEDICINE | Facility: CLINIC | Age: 75
End: 2020-10-26
Payer: MEDICARE

## 2020-10-26 VITALS
DIASTOLIC BLOOD PRESSURE: 68 MMHG | HEART RATE: 60 BPM | BODY MASS INDEX: 24.96 KG/M2 | SYSTOLIC BLOOD PRESSURE: 120 MMHG | RESPIRATION RATE: 20 BRPM | OXYGEN SATURATION: 97 % | TEMPERATURE: 98 F | WEIGHT: 146.19 LBS | HEIGHT: 64 IN

## 2020-10-26 DIAGNOSIS — H81.313 VERTIGO, AURAL, BILATERAL: Primary | ICD-10-CM

## 2020-10-26 DIAGNOSIS — E78.5 DYSLIPIDEMIA: ICD-10-CM

## 2020-10-26 DIAGNOSIS — M54.50 ACUTE MIDLINE LOW BACK PAIN WITHOUT SCIATICA: ICD-10-CM

## 2020-10-26 DIAGNOSIS — H93.13 TINNITUS OF BOTH EARS: ICD-10-CM

## 2020-10-26 DIAGNOSIS — H91.90 HEARING LOSS, UNSPECIFIED HEARING LOSS TYPE, UNSPECIFIED LATERALITY: ICD-10-CM

## 2020-10-26 PROCEDURE — 99999 PR PBB SHADOW E&M-EST. PATIENT-LVL V: ICD-10-PCS | Mod: PBBFAC,,, | Performed by: FAMILY MEDICINE

## 2020-10-26 PROCEDURE — 99213 OFFICE O/P EST LOW 20 MIN: CPT | Mod: S$PBB,,, | Performed by: FAMILY MEDICINE

## 2020-10-26 PROCEDURE — 99999 PR PBB SHADOW E&M-EST. PATIENT-LVL V: CPT | Mod: PBBFAC,,, | Performed by: FAMILY MEDICINE

## 2020-10-26 PROCEDURE — 99215 OFFICE O/P EST HI 40 MIN: CPT | Mod: PBBFAC,PO | Performed by: FAMILY MEDICINE

## 2020-10-26 PROCEDURE — 99213 PR OFFICE/OUTPT VISIT, EST, LEVL III, 20-29 MIN: ICD-10-PCS | Mod: S$PBB,,, | Performed by: FAMILY MEDICINE

## 2020-10-26 RX ORDER — MELOXICAM 15 MG/1
15 TABLET ORAL DAILY PRN
Qty: 90 TABLET | Refills: 2 | Status: SHIPPED | OUTPATIENT
Start: 2020-10-26 | End: 2021-12-09 | Stop reason: ALTCHOICE

## 2020-10-26 RX ORDER — HYDROCHLOROTHIAZIDE 12.5 MG/1
12.5 TABLET ORAL DAILY
Qty: 30 TABLET | Refills: 11 | Status: SHIPPED | OUTPATIENT
Start: 2020-10-26 | End: 2021-04-26 | Stop reason: ALTCHOICE

## 2020-10-26 RX ORDER — DICYCLOMINE HYDROCHLORIDE 20 MG/1
20 TABLET ORAL 3 TIMES DAILY PRN
Qty: 30 TABLET | Refills: 2 | Status: SHIPPED | OUTPATIENT
Start: 2020-10-26 | End: 2021-04-26 | Stop reason: SDUPTHER

## 2020-10-26 NOTE — PATIENT INSTRUCTIONS

## 2020-10-26 NOTE — PROGRESS NOTES
Subjective:       Patient ID: Liliana Hairston is a 74 y.o. female.    Chief Complaint: Follow-up    Vertical symptoms  Patient is 75-year-old female with motion sickness/lightheadedness/tinnitus.  She has dizziness on moving the head to the left more to the right.  The dizziness was suicide after few minutes by staying still.  She denies heart palpitations, no falling nor hearing loss nor ear discharge.    Review of Systems   Constitutional: Negative for fatigue and unexpected weight change.   HENT: Positive for hearing loss and postnasal drip. Negative for sneezing and tinnitus.    Respiratory: Negative for chest tightness and shortness of breath.    Cardiovascular: Negative for chest pain.   Gastrointestinal: Negative for abdominal pain.   Genitourinary: Negative for menstrual problem.   Allergic/Immunologic: Positive for food allergies.   Neurological: Positive for dizziness and weakness.   Psychiatric/Behavioral: Negative for dysphoric mood.       Patient Active Problem List   Diagnosis    Cancer    Primary osteoarthritis of right knee    Acute deep vein thrombosis (DVT) of popliteal vein    Bilateral sensorineural hearing loss    Tinnitus of both ears    Diverticulosis of large intestine without hemorrhage    History of breast cancer    Acute midline low back pain without sciatica    Other hyperlipidemia    Intractable episodic cluster headache    Cough    Decreased range of motion of intervertebral discs of cervical spine    Tenderness    Decreased strength    Long term (current) use of aromatase inhibitors    Screening for osteoporosis       Objective:      Physical Exam  Vitals signs reviewed.   Constitutional:       Appearance: She is well-developed. She is not diaphoretic.   HENT:      Head: Normocephalic and atraumatic.      Right Ear: External ear normal. Decreased hearing noted.      Left Ear: External ear normal. Decreased hearing noted.      Nose: Nose normal.      Mouth/Throat:       Pharynx: No oropharyngeal exudate.   Eyes:      General: No scleral icterus.        Right eye: No discharge.         Left eye: No discharge.      Extraocular Movements:      Right eye: Nystagmus present.      Left eye: Nystagmus present.      Conjunctiva/sclera:      Right eye: Right conjunctiva is injected.      Left eye: Left conjunctiva is injected.      Pupils: Pupils are equal, round, and reactive to light.   Neck:      Musculoskeletal: Normal range of motion and neck supple.      Thyroid: No thyromegaly.      Vascular: No JVD.      Trachea: No tracheal deviation.   Cardiovascular:      Rate and Rhythm: Normal rate.      Heart sounds: Normal heart sounds. No murmur. No friction rub. No gallop.    Pulmonary:      Effort: Pulmonary effort is normal. No respiratory distress.      Breath sounds: No stridor. No wheezing or rales.   Chest:      Chest wall: No tenderness.   Abdominal:      General: Bowel sounds are normal. There is no distension.      Palpations: Abdomen is soft. There is no mass.      Tenderness: There is no abdominal tenderness. There is no guarding or rebound.   Musculoskeletal: Normal range of motion.   Lymphadenopathy:      Cervical: No cervical adenopathy.   Skin:     General: Skin is dry.      Coloration: Skin is not pale.      Findings: No erythema or rash.   Neurological:      Mental Status: She is alert and oriented to person, place, and time.      Cranial Nerves: No cranial nerve deficit.      Motor: No abnormal muscle tone.      Coordination: Coordination normal.      Deep Tendon Reflexes: Reflexes normal.   Psychiatric:         Behavior: Behavior normal.         Thought Content: Thought content normal.         Judgment: Judgment normal.         Lab Results   Component Value Date    WBC 6.92 10/08/2020    HGB 13.8 10/08/2020    HCT 41.1 10/08/2020     10/08/2020    CHOL 181 10/20/2020    TRIG 216 (H) 10/20/2020    HDL 54 10/20/2020    ALT 17 10/08/2020    AST 21 10/08/2020      10/08/2020    K 4.3 10/08/2020     10/08/2020    CREATININE 0.5 10/08/2020    BUN 10 10/08/2020    CO2 25 10/08/2020    TSH 0.946 11/16/2010     The 10-year ASCVD risk score (Ann KAUR Jr., et al., 2013) is: 11.4%    Values used to calculate the score:      Age: 74 years      Sex: Female      Is Non- : No      Diabetic: No      Tobacco smoker: No      Systolic Blood Pressure: 120 mmHg      Is BP treated: No      HDL Cholesterol: 54 mg/dL      Total Cholesterol: 181 mg/dL    Assessment:       1. Vertigo, aural, bilateral    2. Tinnitus of both ears    3. Hearing loss, unspecified hearing loss type, unspecified laterality        Plan:       Vertigo, aural, bilateral    Tinnitus of both ears    Hearing loss, unspecified hearing loss type, unspecified laterality      Vertigo, aural, bilateral  -     hydroCHLOROthiazide (HYDRODIURIL) 12.5 MG Tab; Take 1 tablet (12.5 mg total) by mouth once daily.  Dispense: 30 tablet; Refill: 11  -     Ambulatory referral/consult to Audiology; Future; Expected date: 11/02/2020  -     Ambulatory referral/consult to ENT; Future; Expected date: 11/02/2020    Tinnitus of both ears  -     Ambulatory referral/consult to Audiology; Future; Expected date: 11/02/2020  -     Ambulatory referral/consult to ENT; Future; Expected date: 11/02/2020    Hearing loss, unspecified hearing loss type, unspecified laterality  -     Ambulatory referral/consult to Audiology; Future; Expected date: 11/02/2020  -     Ambulatory referral/consult to ENT; Future; Expected date: 11/02/2020    Dyslipidemia  -     Lipid Panel; Future; Expected date: 10/26/2020  -     Comprehensive Metabolic Panel; Future; Expected date: 10/26/2020    Acute midline low back pain without sciatica  -     meloxicam (MOBIC) 15 MG tablet; Take 1 tablet (15 mg total) by mouth daily as needed.  Dispense: 90 tablet; Refill: 2    Other orders  -     dicyclomine (BENTYL) 20 mg tablet; Take 1 tablet (20 mg total) by  mouth 3 (three) times daily as needed.  Dispense: 30 tablet; Refill: 2      The patient is asked to make an attempt to improve diet and exercise patterns to aid in medical management of this problem.

## 2020-10-27 ENCOUNTER — TELEPHONE (OUTPATIENT)
Dept: FAMILY MEDICINE | Facility: CLINIC | Age: 75
End: 2020-10-27

## 2020-10-27 NOTE — TELEPHONE ENCOUNTER
----- Message from Isaura Ordaz sent at 10/27/2020  4:05 PM CDT -----  Regarding: advice  Contact: pt  Type: Needs Medical Advice  Who Called:  Patient   Pharmacy name and phone #:   [id number] - [place] - [address]  Best Call Back Number: [phone]  Additional Information: [Patient called she is asking why you prescribed the medication      hydroCHLOROthiazide (HYDRODIURIL) 12.5 MG Tab  Pt states this medication is for High Blood Pressure and she does not have High blood pressure.

## 2020-10-27 NOTE — TELEPHONE ENCOUNTER
Patient made aware that HCTZ prescribed for her vertigo not BP. Patient verbalized understanding & has no further questions or concerns.

## 2020-11-02 ENCOUNTER — PATIENT OUTREACH (OUTPATIENT)
Dept: ADMINISTRATIVE | Facility: OTHER | Age: 75
End: 2020-11-02

## 2020-11-02 NOTE — PROGRESS NOTES
Health Maintenance Due   Topic Date Due    Pneumococcal Vaccine (65+ High/Highest Risk) (2 of 2 - PPSV23) 11/19/2019     Updates were requested from care everywhere.  Chart was reviewed for overdue Proactive [facility] Encounters (YANCY) topics (CRS, Breast Cancer Screening, Eye exam)  Health Maintenance has been updated.  LINKS immunization registry triggered.  LINKS not responding.

## 2020-11-04 ENCOUNTER — CLINICAL SUPPORT (OUTPATIENT)
Dept: AUDIOLOGY | Facility: CLINIC | Age: 75
End: 2020-11-04
Payer: MEDICARE

## 2020-11-04 ENCOUNTER — OFFICE VISIT (OUTPATIENT)
Dept: OTOLARYNGOLOGY | Facility: CLINIC | Age: 75
End: 2020-11-04
Payer: MEDICARE

## 2020-11-04 VITALS — WEIGHT: 146.19 LBS | HEIGHT: 64 IN | BODY MASS INDEX: 24.96 KG/M2

## 2020-11-04 DIAGNOSIS — H91.90 HEARING LOSS, UNSPECIFIED HEARING LOSS TYPE, UNSPECIFIED LATERALITY: ICD-10-CM

## 2020-11-04 DIAGNOSIS — R42 DIZZINESS: ICD-10-CM

## 2020-11-04 DIAGNOSIS — H93.13 TINNITUS OF BOTH EARS: ICD-10-CM

## 2020-11-04 DIAGNOSIS — R51.9 CHRONIC DAILY HEADACHE: Primary | ICD-10-CM

## 2020-11-04 DIAGNOSIS — H93.13 TINNITUS, BILATERAL: ICD-10-CM

## 2020-11-04 DIAGNOSIS — H93.19 TINNITUS, UNSPECIFIED LATERALITY: ICD-10-CM

## 2020-11-04 DIAGNOSIS — R42 DISEQUILIBRIUM: ICD-10-CM

## 2020-11-04 DIAGNOSIS — H90.3 BILATERAL HIGH FREQUENCY SENSORINEURAL HEARING LOSS: ICD-10-CM

## 2020-11-04 DIAGNOSIS — H90.3 SENSORINEURAL HEARING LOSS (SNHL) OF BOTH EARS: Primary | ICD-10-CM

## 2020-11-04 DIAGNOSIS — H81.313 VERTIGO, AURAL, BILATERAL: ICD-10-CM

## 2020-11-04 PROCEDURE — 99214 OFFICE O/P EST MOD 30 MIN: CPT | Mod: PBBFAC,PO | Performed by: NURSE PRACTITIONER

## 2020-11-04 PROCEDURE — 92557 COMPREHENSIVE HEARING TEST: CPT | Mod: PBBFAC,PO | Performed by: AUDIOLOGIST

## 2020-11-04 PROCEDURE — 99999 PR PBB SHADOW E&M-EST. PATIENT-LVL II: CPT | Mod: PBBFAC,,,

## 2020-11-04 PROCEDURE — 99999 PR PBB SHADOW E&M-EST. PATIENT-LVL IV: CPT | Mod: PBBFAC,,, | Performed by: NURSE PRACTITIONER

## 2020-11-04 PROCEDURE — 99214 PR OFFICE/OUTPT VISIT, EST, LEVL IV, 30-39 MIN: ICD-10-PCS | Mod: S$PBB,,, | Performed by: NURSE PRACTITIONER

## 2020-11-04 PROCEDURE — 92567 TYMPANOMETRY: CPT | Mod: PBBFAC,PO | Performed by: AUDIOLOGIST

## 2020-11-04 PROCEDURE — 99212 OFFICE O/P EST SF 10 MIN: CPT | Mod: PBBFAC,27,PO,25

## 2020-11-04 PROCEDURE — 99999 PR PBB SHADOW E&M-EST. PATIENT-LVL II: ICD-10-PCS | Mod: PBBFAC,,,

## 2020-11-04 PROCEDURE — 99999 PR PBB SHADOW E&M-EST. PATIENT-LVL IV: ICD-10-PCS | Mod: PBBFAC,,, | Performed by: NURSE PRACTITIONER

## 2020-11-04 PROCEDURE — 99214 OFFICE O/P EST MOD 30 MIN: CPT | Mod: S$PBB,,, | Performed by: NURSE PRACTITIONER

## 2020-11-04 NOTE — LETTER
November 4, 2020      Karthik Ford MD  [address] [facility] [place] [address]           [place] [facility]  [address] [place] [address]  Phone: [phone]  Fax: [phone]          Patient: Liliana Hairston   MR Number: [id number]   YOB: 1945   Date of Visit: 11/4/2020       Dear Dr. Karthik Ford:    Thank you for referring Liliana Hairston to me for evaluation. Attached you will find relevant portions of my assessment and plan of care.    If you have questions, please do not hesitate to call me. I look forward to following Liliana Hairston along with you.    Sincerely,    Aziza Bernabe, NP    Enclosure  CC:  No Recipients    If you would like to receive this communication electronically, please contact [email or web address] or [phone] to request more information on [facility] access.    For providers and/or their staff who would like to refer a patient to [facility], please contact us through our one-stop-shop provider referral line, [facility], at [phone].    If you feel you have received this communication in error or would no longer like to receive these types of communications, please e-mail [email or web address]

## 2020-11-04 NOTE — PATIENT INSTRUCTIONS
DIZZINESS:  · Dizziness is an extremely complex issue that may arise from many sources. It requires coordination between the visual system, vestibular system, as well as the proprioreceptive system. Additionally balance is compromised in the setting of musculoskeletal, cerebral, cardiac, and numerous physiologic disorders. The complex interplay between these systems may also lead to dizziness if there is  dysynchrony between the bilateral vestibular systems.   · Central vestibular systems can generally be distinguished from peripheral vestibulopathies by the presence of non-vestibular neurologic symptoms (focal weakness, headache), light-headedness (rather than true vertigo), near syncope, weak limbs, panic, fuzziness/cloudiness in mentation, and clumsiness. Peripheral vestibulopathies are generally, at some point during their course, characterized by a true vertiginous sensation of movement, difficulty with sudden head movements, nausea, and possibly oscillopsia.   VERTIGO:   · BPPV is the most common cause of episodic vertigo. Symptoms generally last for seconds and resolve when motionless. BPPV may occur spontaneously, but frequently follows head trauma. Nystagmus is rotary or torsional around a center axis. Canalith repositioning maneuvers (CRM) are the treatment of choice for this condition rather than vestibular suppressants. Mild imbalance following CRM is common and may last a week.   · Vestibular neuronitis and labyrinthitis can be distinguished by the presence of sensorineural hearing loss in labyrinthitis (no hearing loss in neuronitis). During their active phase, vertigo is constant. MRI may be necessary during this phase to exclude CNS pathology particularly if asymmetry is seen on the audiogram. Frequently they are preceded by a viral illness. Both result in permanent partial end organ weakness of the affected vestibular nerve. Otherwise they are essentially the same. The early phase (vertiginous, 1-3  "days) is characterized by acute onset of vertigo that is constant and present even in the absence of motion. Nystagmus is directed away from the affected ear. In the acute phase, steroids, limited use of vestibular suppressants and hydration are the most effective treatment. Patients then enter the second phase of uncompensated vestibulopathy where they have a general sense of imbalance. The duration of this phase depends on several factors, but is generally delayed in the presence of vestibular suppressants, advance age, central/systemic balance issues and sessile behavior. Phase 3 is compensated vestibulopathy where symptoms generally occur only with sudden head movements and can be seen with "catch up" saccades on head thrust. However, in the presence of bilateral VN, oscillopsia is the hallmark of the disease, and compensation is frequently very delayed and poor.  · Meniere's disease is typically (85%) unilateral and defined by 2 spontaneous vertigo attacks lasting 20 minutes or more, documented hearing loss (typically low-tone, frequently fluctuating) of the affected ear, aural fullness of the affected ear, and tinnitus in the affected ear. This disease can be difficult to distinguish from vestibular migraines, which are not always associated with headaches.   · Vestibular schwannomas may have constant or episodic vertigo, frequently SNHL, and sometimes accompanied by headache or facial numbness.  The diagnosis and options of management were discussed at length with the patient. Based on the patient's history, physical exam, and most recent audiogram, my suspicion for vestibular pathology is quite low. I do not feel that comprehensive vestibular system testing is warranted on her at this time. Patient is encouraged to call for any vertigo.   The diagnosis and options of management were discussed at length with the patient. Based on the patient's history, physical exam, and audiogram, I recommend a VNG. "           Tinnitus (Ringing in the Ears)     Treatment may include maskers and hearing aids.     Tinnitus is the term for a noise in your ear not caused by an outside sound. The noise might be a ringing, clicking, hiss, or roar. It can vary in pitch and may be soft or quite loud. For some people, tinnitus is a minor nuisance. But for others, the noise can make it hard to hear, work, and even sleep. When tinnitus can't be cured, a number of treatments may offer relief.  What causes tinnitus?  Loud noises, hearing loss, and ear wax can cause tinnitus. So can certain medicines. Large amounts of aspirin or caffeine are sometimes to blame. In many cases, the exact cause of tinnitus is unknown.  How is tinnitus treated?  Identifying and removing the cause is the best way to treat tinnitus. For that reason, your healthcare provider may refer you to an otolaryngologist (ear, nose, and throat doctor). Your hearing may also be checked by an audiologist (hearing specialist). If you have hearing loss, wearing a hearing aid may help your tinnitus. When the cause can't be found, the tinnitus itself may be treated. Some of the treatments are listed below, and your healthcare provider can tell you more about them:  · Maskers are small devices that look like hearing aids. They emit a pleasant sound that helps cover up the ringing in your ears. Hearing aids and maskers are sometimes used together.  · Medicines that treat anxiety and depression may ease tinnitus in some people.  · Hypnosis or relaxation therapy may help head noise seem less severe.  · Tinnitus retraining therapy combines counseling and maskers. Both can help take your mind off the tinnitus.  Tinnitus measures:  1.  Avoid exposure to loud sounds, which will exacerbate tinnitus.  Wear ear protection around loud noises.  2.  Get your blood pressure check regularly.  If it is high, see your doctor.  3.  Check with your PCP whether labs can be done to check for anemia and  hyperthyroid, as these can worsen tinnitus.   4.  Decrease salt intake.  5.  Avoid stimulants such as caffeine and tobacco.  6.  Exercise daily to improve circulation. Poor circulation worsens tinnitus.   7.  Avoid sleep deprivation. Sleep deprivation and insomnia can worsen tinnitus. Get adequate rest.  8.  Reduce aspirin use, if possible. Aspirin as well as NSAIDs and narcotic pain relievers can exacerbate tinnitus.   9.  Reduce quinine consumption (taken for leg cramps and in certain tonic water preparations).  10.  Stop worrying about the noise.  Stress worsens tinnitus. Recognize the noise as an annoyance and learn to ignore it as much as possible. Patients with higher rates of anxiety, depression, concentration, or problems sleeping may need to discuss taking something for anxiety or depression with their primary care provider.   11.  Consider a trial of lipoflavanoids, slow-release niacin, or Arches' Tinnitus Formula (over-the-counter).  12.  Purchase a white noise machine to mask tinnitus.  13. Cognitive Behavioral Therapy, otherwise known as Tinnitus Retraining Therapy, can be done by an audiologist certified in TRT or a cognitive behavioral therapist who is certified in TRT.   14.  When no underlying pathology can be identified, an audiogram is needed to screen for hearing loss. If hearing loss is noted, hearing aids with masking technology are recommended to help relieve tinnitus.     Elimination of exacerbating factors such as elevated blood pressure, high sodium intake, caffeine/stimulants, sleep deprivation/insomnia, certain medications, exposure to loud sounds, etc.   · White noise, hearing aids w/masking technology, and tinnitus retraining therapy (TRT).  ·   For more information  · American Speech-Hearing-Language Association [phone] www.[email or web address]  · American Tinnitus Association [phone] [email or web address]  · National Canon on Deafness and other Communication Disorders [phone]  www.nid.nih.gov

## 2020-11-04 NOTE — PROGRESS NOTES
"Subjective:       Patient ID: Liliana Hairston is a 74 y.o. female.    Chief Complaint: No chief complaint on file.    HPI   Patient last seen December 2017 by Dr. Roach for tinnitus and hearing loss. She is referred back at this time by Dr. Ford for consultation for dizziness.  Patient reports dizziness started approximately 1 month ago.  She feels dizziness with bending over to put her shoes on and with getting up from a sitting position.  She has to wait a minute for dizziness to pass.  She describes it as feeling like she might fall, and her eyes unable to focus. She denies any true vertigo: no spinning or swirling sensation. She denies "bed spins" -- never experiences it with getting out of bed in the morning, or with laying back in bed at night, or with turning over in bed. She reports daily headaches for many years. Takes Tylenol & ibuprofen. She has never seen a headache specialist. She suspects headaches are sinus, though she denies mucopus from nose or throat, no facial swelling/pain, no dental pain, no diminished olfaction/taste, no headaches around the eyes, no sore throat or productive cough. Headaches are mid-frontal.      Review of Systems   Constitutional: Negative.    HENT: Positive for hearing loss and tinnitus (AU, constant).    Eyes: Negative.    Respiratory: Negative.    Cardiovascular: Negative.    Gastrointestinal: Negative.    Musculoskeletal: Negative.    Integumentary:  Negative.   Neurological: Positive for dizziness and headaches.   Hematological: Negative.    Psychiatric/Behavioral: Negative.          Objective:      Physical Exam  Vitals signs and nursing note reviewed.   Constitutional:       General: She is not in acute distress.     Appearance: She is well-developed. She is not ill-appearing or diaphoretic.   HENT:      Head: Normocephalic and atraumatic.      Right Ear: Hearing, tympanic membrane, ear canal and external ear normal. No middle ear effusion. Tympanic membrane is not " erythematous.      Left Ear: Hearing, tympanic membrane, ear canal and external ear normal.  No middle ear effusion. Tympanic membrane is not erythematous.      Nose: Nose normal.      Mouth/Throat:      Pharynx: Uvula midline.   Eyes:      General: Lids are normal. No scleral icterus.        Right eye: No discharge.         Left eye: No discharge.   Neck:      Musculoskeletal: Normal range of motion and neck supple.      Trachea: Trachea normal. No tracheal deviation.   Cardiovascular:      Rate and Rhythm: Normal rate.   Pulmonary:      Effort: Pulmonary effort is normal. No respiratory distress.      Breath sounds: No stridor. No wheezing.   Musculoskeletal: Normal range of motion.   Skin:     General: Skin is warm and dry.      Coloration: Skin is not pale.   Neurological:      Mental Status: She is alert and oriented to person, place, and time.      Coordination: Coordination normal.      Gait: Gait normal.   Psychiatric:         Speech: Speech normal.         Behavior: Behavior normal. Behavior is cooperative.         Thought Content: Thought content normal.         Judgment: Judgment normal.         Negative Eaton-Hallpike AU  Assessment:     Bilateral high-frequency SNHL    Bilateral tinnitus  Disequilibrium, non-vertiginous  Chronic daily headaches  Plan:     PATIENT IS MEDICALLY CLEARED FOR HEARING AIDS. The patient's hearing loss is not due to temporarily, correctable physical condition. There are no contraindications to hearing aid candidacy. Patient's audiogram reveals the patient is a candidate for amplification. Audiogram is reviewed in detail with the patient. The audiologist's recommendation that the patient have amplification/hearing aids is discussed and questions answered. Patient has been given information by the audiologist on how to schedule a hearing aid consultation. Patient is encouraged to wear ear protection in loud noise and return annually for hearing test.   Discussed tinnitus. Tinnitus  handout given and discussed at length. Discussed elimination of exacerbating factors such as elevated blood pressure, high sodium intake, caffeine/stimulants, sleep deprivation/insomnia, certain medications, exposure to loud sounds, etc. Discussed white noise, hearing aids w/masking technology, and tinnitus retraining therapy (TRT). All questions answered.   Lengthy discussion regarding vertigo versus dysequilibrium. Patient encouraged to call me for any vertigo and will proceed with vestibular function studies.   Return to clinic as needed for further ENT concerns.

## 2020-11-12 ENCOUNTER — CLINICAL SUPPORT (OUTPATIENT)
Dept: AUDIOLOGY | Facility: CLINIC | Age: 75
End: 2020-11-12
Payer: MEDICARE

## 2020-11-12 NOTE — PROGRESS NOTES
The patient was seen for a hearing aid consult.  The patient's audiogram was discussed in detail with the patient.The patient reported she has some difficulty understanding speech in noise and that she has a high-pitched tinnitus.  We discussed various types of hearing aids and levels of technology.  The patient reported she is not sure if she is ready to pursue a hearing aid fitting.  She reported that she has headaches very frequently and that she is mostly interested in getting help with this, at this point.  The patient indicated she will contact a headache specialist.  The patient reported that she will be switching insurance policies in January and that her new policy will provide a hearing aid benefit. The 30 day trial period and payment procedure were discussed.  The patient reported she will contact us after she has switched to her new insurance policy.  The patient was informed that a hearing aid fitting has to be within 6 months of a hearing evaluation..

## 2020-11-16 ENCOUNTER — TELEPHONE (OUTPATIENT)
Dept: NEUROLOGY | Facility: CLINIC | Age: 75
End: 2020-11-16

## 2020-11-16 NOTE — TELEPHONE ENCOUNTER
----- Message from Qian  sent at 11/13/2020 10:32 AM CST -----  Regarding: appt access  Contact: pt  Type: Needs Medical Advice    Who Called:      Best Call Back Number:     Additional Information: Requesting a call back from Nurse, regarding appt needs access to an appt she does have a referral on file ,please call back

## 2020-12-07 ENCOUNTER — TELEPHONE (OUTPATIENT)
Dept: NEUROLOGY | Facility: CLINIC | Age: 75
End: 2020-12-07

## 2020-12-29 ENCOUNTER — PATIENT OUTREACH (OUTPATIENT)
Dept: ADMINISTRATIVE | Facility: OTHER | Age: 75
End: 2020-12-29

## 2020-12-29 NOTE — PROGRESS NOTES
Health Maintenance Due   Topic Date Due    Pneumococcal Vaccine (65+ High/Highest Risk) (2 of 2 - PPSV23) 11/19/2019     Updates were requested from care everywhere.  Chart was reviewed for overdue Proactive [facility] Encounters ([facility]) topics (CRS, Breast Cancer Screening, Eye exam)  Health Maintenance has been updated.  LINKS immunization registry triggered.  Immunizations were reconciled.

## 2021-01-04 ENCOUNTER — OFFICE VISIT (OUTPATIENT)
Dept: NEUROLOGY | Facility: CLINIC | Age: 76
End: 2021-01-04
Payer: MEDICARE

## 2021-01-04 ENCOUNTER — LAB VISIT (OUTPATIENT)
Dept: LAB | Facility: HOSPITAL | Age: 76
End: 2021-01-04
Attending: PSYCHIATRY & NEUROLOGY
Payer: MEDICARE

## 2021-01-04 VITALS
DIASTOLIC BLOOD PRESSURE: 73 MMHG | RESPIRATION RATE: 17 BRPM | HEART RATE: 61 BPM | WEIGHT: 143.75 LBS | TEMPERATURE: 98 F | BODY MASS INDEX: 24.54 KG/M2 | HEIGHT: 64 IN | SYSTOLIC BLOOD PRESSURE: 130 MMHG

## 2021-01-04 DIAGNOSIS — R51.9 NEW ONSET OF HEADACHES AFTER AGE 50: ICD-10-CM

## 2021-01-04 DIAGNOSIS — G44.221 CHRONIC TENSION-TYPE HEADACHE, INTRACTABLE: ICD-10-CM

## 2021-01-04 DIAGNOSIS — G43.119 INTRACTABLE MIGRAINE WITH AURA WITHOUT STATUS MIGRAINOSUS: ICD-10-CM

## 2021-01-04 DIAGNOSIS — R51.9 CHRONIC DAILY HEADACHE: Primary | ICD-10-CM

## 2021-01-04 PROCEDURE — 82565 ASSAY OF CREATININE: CPT

## 2021-01-04 PROCEDURE — 99205 OFFICE O/P NEW HI 60 MIN: CPT | Mod: S$PBB,,, | Performed by: PSYCHIATRY & NEUROLOGY

## 2021-01-04 PROCEDURE — 99214 OFFICE O/P EST MOD 30 MIN: CPT | Mod: PBBFAC,PO | Performed by: PSYCHIATRY & NEUROLOGY

## 2021-01-04 PROCEDURE — 36415 COLL VENOUS BLD VENIPUNCTURE: CPT | Mod: PO

## 2021-01-04 PROCEDURE — 99205 PR OFFICE/OUTPT VISIT, NEW, LEVL V, 60-74 MIN: ICD-10-PCS | Mod: S$PBB,,, | Performed by: PSYCHIATRY & NEUROLOGY

## 2021-01-04 PROCEDURE — 99999 PR PBB SHADOW E&M-EST. PATIENT-LVL IV: CPT | Mod: PBBFAC,,, | Performed by: PSYCHIATRY & NEUROLOGY

## 2021-01-04 PROCEDURE — 99999 PR PBB SHADOW E&M-EST. PATIENT-LVL IV: ICD-10-PCS | Mod: PBBFAC,,, | Performed by: PSYCHIATRY & NEUROLOGY

## 2021-01-04 RX ORDER — TIZANIDINE 4 MG/1
4 TABLET ORAL NIGHTLY
Qty: 30 TABLET | Refills: 3 | Status: SHIPPED | OUTPATIENT
Start: 2021-01-04 | End: 2021-03-04 | Stop reason: SDUPTHER

## 2021-01-05 LAB
CREAT SERPL-MCNC: 0.7 MG/DL (ref 0.5–1.4)
EST. GFR  (AFRICAN AMERICAN): >60 ML/MIN/1.73 M^2
EST. GFR  (NON AFRICAN AMERICAN): >60 ML/MIN/1.73 M^2

## 2021-01-07 ENCOUNTER — HOSPITAL ENCOUNTER (OUTPATIENT)
Dept: RADIOLOGY | Facility: HOSPITAL | Age: 76
Discharge: HOME | End: 2021-01-07
Attending: PSYCHIATRY & NEUROLOGY
Payer: MEDICARE

## 2021-01-07 DIAGNOSIS — R51.9 NEW ONSET OF HEADACHES AFTER AGE 50: ICD-10-CM

## 2021-01-07 PROCEDURE — 25500020 PHARM REV CODE 255: Performed by: PSYCHIATRY & NEUROLOGY

## 2021-01-07 PROCEDURE — 70553 MRI BRAIN W WO CONTRAST: ICD-10-PCS | Mod: 26,,, | Performed by: RADIOLOGY

## 2021-01-07 PROCEDURE — 70553 MRI BRAIN STEM W/O & W/DYE: CPT | Mod: TC

## 2021-01-07 PROCEDURE — 70553 MRI BRAIN STEM W/O & W/DYE: CPT | Mod: 26,,, | Performed by: RADIOLOGY

## 2021-01-07 PROCEDURE — A9585 GADOBUTROL INJECTION: HCPCS | Performed by: PSYCHIATRY & NEUROLOGY

## 2021-01-07 RX ORDER — GADOBUTROL 604.72 MG/ML
6 INJECTION INTRAVENOUS
Status: COMPLETED | OUTPATIENT
Start: 2021-01-07 | End: 2021-01-07

## 2021-01-07 RX ADMIN — GADOBUTROL 6 ML: 604.72 INJECTION INTRAVENOUS at 01:01

## 2021-01-09 ENCOUNTER — IMMUNIZATION (OUTPATIENT)
Dept: PRIMARY CARE CLINIC | Facility: CLINIC | Age: 76
End: 2021-01-09
Payer: MEDICARE

## 2021-01-09 DIAGNOSIS — Z23 NEED FOR VACCINATION: ICD-10-CM

## 2021-01-09 PROCEDURE — 91300 COVID-19, MRNA, LNP-S, PF, 30 MCG/0.3 ML DOSE VACCINE: ICD-10-PCS | Mod: S$GLB,,, | Performed by: FAMILY MEDICINE

## 2021-01-09 PROCEDURE — 0001A COVID-19, MRNA, LNP-S, PF, 30 MCG/0.3 ML DOSE VACCINE: CPT | Mod: S$GLB,,, | Performed by: FAMILY MEDICINE

## 2021-01-09 PROCEDURE — 0001A COVID-19, MRNA, LNP-S, PF, 30 MCG/0.3 ML DOSE VACCINE: ICD-10-PCS | Mod: S$GLB,,, | Performed by: FAMILY MEDICINE

## 2021-01-09 PROCEDURE — 91300 COVID-19, MRNA, LNP-S, PF, 30 MCG/0.3 ML DOSE VACCINE: CPT | Mod: S$GLB,,, | Performed by: FAMILY MEDICINE

## 2021-01-18 PROBLEM — Z13.820 SCREENING FOR OSTEOPOROSIS: Status: RESOLVED | Noted: 2020-10-13 | Resolved: 2021-01-18

## 2021-01-30 ENCOUNTER — IMMUNIZATION (OUTPATIENT)
Dept: PRIMARY CARE CLINIC | Facility: CLINIC | Age: 76
End: 2021-01-30
Payer: MEDICARE

## 2021-01-30 DIAGNOSIS — Z23 NEED FOR VACCINATION: Primary | ICD-10-CM

## 2021-01-30 PROCEDURE — 0002A COVID-19, MRNA, LNP-S, PF, 30 MCG/0.3 ML DOSE VACCINE: ICD-10-PCS | Mod: S$GLB,,, | Performed by: FAMILY MEDICINE

## 2021-01-30 PROCEDURE — 91300 COVID-19, MRNA, LNP-S, PF, 30 MCG/0.3 ML DOSE VACCINE: ICD-10-PCS | Mod: S$GLB,,, | Performed by: FAMILY MEDICINE

## 2021-01-30 PROCEDURE — 91300 COVID-19, MRNA, LNP-S, PF, 30 MCG/0.3 ML DOSE VACCINE: CPT | Mod: S$GLB,,, | Performed by: FAMILY MEDICINE

## 2021-01-30 PROCEDURE — 0002A COVID-19, MRNA, LNP-S, PF, 30 MCG/0.3 ML DOSE VACCINE: CPT | Mod: S$GLB,,, | Performed by: FAMILY MEDICINE

## 2021-02-09 PROBLEM — C50.411 MALIGNANT NEOPLASM OF UPPER-OUTER QUADRANT OF RIGHT BREAST IN FEMALE, ESTROGEN RECEPTOR POSITIVE: Status: ACTIVE | Noted: 2021-02-09

## 2021-02-09 PROBLEM — Z17.0 MALIGNANT NEOPLASM OF UPPER-OUTER QUADRANT OF RIGHT BREAST IN FEMALE, ESTROGEN RECEPTOR POSITIVE: Status: ACTIVE | Noted: 2021-02-09

## 2021-03-04 ENCOUNTER — OFFICE VISIT (OUTPATIENT)
Dept: NEUROLOGY | Facility: CLINIC | Age: 76
End: 2021-03-04
Payer: MEDICARE

## 2021-03-04 ENCOUNTER — PATIENT OUTREACH (OUTPATIENT)
Dept: ADMINISTRATIVE | Facility: OTHER | Age: 76
End: 2021-03-04

## 2021-03-04 VITALS
HEIGHT: 64 IN | DIASTOLIC BLOOD PRESSURE: 69 MMHG | SYSTOLIC BLOOD PRESSURE: 114 MMHG | HEART RATE: 67 BPM | BODY MASS INDEX: 24.72 KG/M2 | TEMPERATURE: 99 F | WEIGHT: 144.81 LBS | RESPIRATION RATE: 17 BRPM

## 2021-03-04 DIAGNOSIS — G43.119 INTRACTABLE MIGRAINE WITH AURA WITHOUT STATUS MIGRAINOSUS: ICD-10-CM

## 2021-03-04 DIAGNOSIS — G44.221 CHRONIC TENSION-TYPE HEADACHE, INTRACTABLE: Primary | ICD-10-CM

## 2021-03-04 PROCEDURE — 99999 PR PBB SHADOW E&M-EST. PATIENT-LVL IV: ICD-10-PCS | Mod: PBBFAC,,, | Performed by: PSYCHIATRY & NEUROLOGY

## 2021-03-04 PROCEDURE — 99214 PR OFFICE/OUTPT VISIT, EST, LEVL IV, 30-39 MIN: ICD-10-PCS | Mod: S$GLB,,, | Performed by: PSYCHIATRY & NEUROLOGY

## 2021-03-04 PROCEDURE — 99214 OFFICE O/P EST MOD 30 MIN: CPT | Mod: S$GLB,,, | Performed by: PSYCHIATRY & NEUROLOGY

## 2021-03-04 PROCEDURE — 99999 PR PBB SHADOW E&M-EST. PATIENT-LVL IV: CPT | Mod: PBBFAC,,, | Performed by: PSYCHIATRY & NEUROLOGY

## 2021-03-04 RX ORDER — TIZANIDINE 4 MG/1
4 TABLET ORAL NIGHTLY
Qty: 90 TABLET | Refills: 2 | Status: SHIPPED | OUTPATIENT
Start: 2021-03-04 | End: 2021-08-09 | Stop reason: SDUPTHER

## 2021-03-08 ENCOUNTER — TELEPHONE (OUTPATIENT)
Dept: NEUROLOGY | Facility: CLINIC | Age: 76
End: 2021-03-08

## 2021-03-09 ENCOUNTER — TELEPHONE (OUTPATIENT)
Dept: NEUROLOGY | Facility: CLINIC | Age: 76
End: 2021-03-09

## 2021-04-07 ENCOUNTER — LAB VISIT (OUTPATIENT)
Dept: LAB | Facility: HOSPITAL | Age: 76
End: 2021-04-07
Attending: INTERNAL MEDICINE
Payer: MEDICARE

## 2021-04-07 DIAGNOSIS — Z85.3 HISTORY OF BREAST CANCER: ICD-10-CM

## 2021-04-07 DIAGNOSIS — Z13.820 SCREENING FOR OSTEOPOROSIS: ICD-10-CM

## 2021-04-07 DIAGNOSIS — Z79.811 LONG TERM (CURRENT) USE OF AROMATASE INHIBITORS: ICD-10-CM

## 2021-04-07 LAB
ALBUMIN SERPL BCP-MCNC: 4.2 G/DL (ref 3.5–5.2)
ALP SERPL-CCNC: 75 U/L (ref 55–135)
ALT SERPL W/O P-5'-P-CCNC: 17 U/L (ref 10–44)
ANION GAP SERPL CALC-SCNC: 8 MMOL/L (ref 8–16)
AST SERPL-CCNC: 18 U/L (ref 10–40)
BASOPHILS # BLD AUTO: 0.03 K/UL (ref 0–0.2)
BASOPHILS NFR BLD: 0.5 % (ref 0–1.9)
BILIRUB SERPL-MCNC: 0.6 MG/DL (ref 0.1–1)
BUN SERPL-MCNC: 14 MG/DL (ref 8–23)
CALCIUM SERPL-MCNC: 9.2 MG/DL (ref 8.7–10.5)
CEA SERPL-MCNC: 1.1 NG/ML (ref 0–5)
CHLORIDE SERPL-SCNC: 103 MMOL/L (ref 95–110)
CO2 SERPL-SCNC: 25 MMOL/L (ref 23–29)
CREAT SERPL-MCNC: 0.6 MG/DL (ref 0.5–1.4)
DIFFERENTIAL METHOD: ABNORMAL
EOSINOPHIL # BLD AUTO: 0.1 K/UL (ref 0–0.5)
EOSINOPHIL NFR BLD: 0.9 % (ref 0–8)
ERYTHROCYTE [DISTWIDTH] IN BLOOD BY AUTOMATED COUNT: 12.2 % (ref 11.5–14.5)
EST. GFR  (AFRICAN AMERICAN): >60 ML/MIN/1.73 M^2
EST. GFR  (NON AFRICAN AMERICAN): >60 ML/MIN/1.73 M^2
GLUCOSE SERPL-MCNC: 101 MG/DL (ref 70–110)
HCT VFR BLD AUTO: 40.6 % (ref 37–48.5)
HGB BLD-MCNC: 13.5 G/DL (ref 12–16)
IMM GRANULOCYTES # BLD AUTO: 0.03 K/UL (ref 0–0.04)
IMM GRANULOCYTES NFR BLD AUTO: 0.5 % (ref 0–0.5)
LYMPHOCYTES # BLD AUTO: 2 K/UL (ref 1–4.8)
LYMPHOCYTES NFR BLD: 30.9 % (ref 18–48)
MCH RBC QN AUTO: 31.4 PG (ref 27–31)
MCHC RBC AUTO-ENTMCNC: 33.3 G/DL (ref 32–36)
MCV RBC AUTO: 94 FL (ref 82–98)
MONOCYTES # BLD AUTO: 0.6 K/UL (ref 0.3–1)
MONOCYTES NFR BLD: 9.4 % (ref 4–15)
NEUTROPHILS # BLD AUTO: 3.8 K/UL (ref 1.8–7.7)
NEUTROPHILS NFR BLD: 57.8 % (ref 38–73)
NRBC BLD-RTO: 0 /100 WBC
PLATELET # BLD AUTO: 300 K/UL (ref 150–450)
PMV BLD AUTO: 8.9 FL (ref 9.2–12.9)
POTASSIUM SERPL-SCNC: 3.9 MMOL/L (ref 3.5–5.1)
PROT SERPL-MCNC: 7.2 G/DL (ref 6–8.4)
RBC # BLD AUTO: 4.3 M/UL (ref 4–5.4)
SODIUM SERPL-SCNC: 136 MMOL/L (ref 136–145)
WBC # BLD AUTO: 6.58 K/UL (ref 3.9–12.7)

## 2021-04-07 PROCEDURE — 86300 IMMUNOASSAY TUMOR CA 15-3: CPT | Performed by: INTERNAL MEDICINE

## 2021-04-07 PROCEDURE — 36415 COLL VENOUS BLD VENIPUNCTURE: CPT | Performed by: INTERNAL MEDICINE

## 2021-04-07 PROCEDURE — 80053 COMPREHEN METABOLIC PANEL: CPT | Performed by: INTERNAL MEDICINE

## 2021-04-07 PROCEDURE — 85025 COMPLETE CBC W/AUTO DIFF WBC: CPT | Performed by: INTERNAL MEDICINE

## 2021-04-07 PROCEDURE — 82378 CARCINOEMBRYONIC ANTIGEN: CPT | Performed by: INTERNAL MEDICINE

## 2021-04-07 PROCEDURE — 86300 IMMUNOASSAY TUMOR CA 15-3: CPT | Mod: 91 | Performed by: INTERNAL MEDICINE

## 2021-04-08 LAB
CANCER AG15-3 SERPL-ACNC: 19.1 U/ML (ref 0–25)
CANCER AG27-29 SERPL-ACNC: 22.7 U/ML (ref 0–38.6)

## 2021-04-11 ENCOUNTER — PATIENT OUTREACH (OUTPATIENT)
Dept: ADMINISTRATIVE | Facility: HOSPITAL | Age: 76
End: 2021-04-11

## 2021-04-19 ENCOUNTER — LAB VISIT (OUTPATIENT)
Dept: LAB | Facility: HOSPITAL | Age: 76
End: 2021-04-19
Attending: FAMILY MEDICINE
Payer: MEDICARE

## 2021-04-19 DIAGNOSIS — E78.5 DYSLIPIDEMIA: ICD-10-CM

## 2021-04-19 LAB
ALBUMIN SERPL BCP-MCNC: 4 G/DL (ref 3.5–5.2)
ALP SERPL-CCNC: 90 U/L (ref 55–135)
ALT SERPL W/O P-5'-P-CCNC: 17 U/L (ref 10–44)
ANION GAP SERPL CALC-SCNC: 7 MMOL/L (ref 8–16)
AST SERPL-CCNC: 18 U/L (ref 10–40)
BILIRUB SERPL-MCNC: 0.4 MG/DL (ref 0.1–1)
BUN SERPL-MCNC: 11 MG/DL (ref 8–23)
CALCIUM SERPL-MCNC: 9.4 MG/DL (ref 8.7–10.5)
CHLORIDE SERPL-SCNC: 104 MMOL/L (ref 95–110)
CHOLEST SERPL-MCNC: 193 MG/DL (ref 120–199)
CHOLEST/HDLC SERPL: 3.7 {RATIO} (ref 2–5)
CO2 SERPL-SCNC: 27 MMOL/L (ref 23–29)
CREAT SERPL-MCNC: 0.7 MG/DL (ref 0.5–1.4)
EST. GFR  (AFRICAN AMERICAN): >60 ML/MIN/1.73 M^2
EST. GFR  (NON AFRICAN AMERICAN): >60 ML/MIN/1.73 M^2
GLUCOSE SERPL-MCNC: 106 MG/DL (ref 70–110)
HDLC SERPL-MCNC: 52 MG/DL (ref 40–75)
HDLC SERPL: 26.9 % (ref 20–50)
LDLC SERPL CALC-MCNC: 101.6 MG/DL (ref 63–159)
NONHDLC SERPL-MCNC: 141 MG/DL
POTASSIUM SERPL-SCNC: 4.5 MMOL/L (ref 3.5–5.1)
PROT SERPL-MCNC: 7.5 G/DL (ref 6–8.4)
SODIUM SERPL-SCNC: 138 MMOL/L (ref 136–145)
TRIGL SERPL-MCNC: 197 MG/DL (ref 30–150)

## 2021-04-19 PROCEDURE — 80061 LIPID PANEL: CPT | Performed by: FAMILY MEDICINE

## 2021-04-19 PROCEDURE — 36415 COLL VENOUS BLD VENIPUNCTURE: CPT | Mod: PO | Performed by: FAMILY MEDICINE

## 2021-04-19 PROCEDURE — 80053 COMPREHEN METABOLIC PANEL: CPT | Performed by: FAMILY MEDICINE

## 2021-04-26 ENCOUNTER — OFFICE VISIT (OUTPATIENT)
Dept: FAMILY MEDICINE | Facility: CLINIC | Age: 76
End: 2021-04-26
Payer: MEDICARE

## 2021-04-26 VITALS
SYSTOLIC BLOOD PRESSURE: 116 MMHG | HEART RATE: 78 BPM | DIASTOLIC BLOOD PRESSURE: 72 MMHG | TEMPERATURE: 98 F | OXYGEN SATURATION: 96 % | WEIGHT: 143.31 LBS | HEIGHT: 64 IN | RESPIRATION RATE: 16 BRPM | BODY MASS INDEX: 24.46 KG/M2

## 2021-04-26 DIAGNOSIS — Z23 NEED FOR PNEUMOCOCCAL VACCINATION: ICD-10-CM

## 2021-04-26 DIAGNOSIS — H93.13 TINNITUS OF BOTH EARS: ICD-10-CM

## 2021-04-26 DIAGNOSIS — R10.9 STOMACH ACHE: ICD-10-CM

## 2021-04-26 DIAGNOSIS — E78.5 DYSLIPIDEMIA: ICD-10-CM

## 2021-04-26 DIAGNOSIS — M54.2 CERVICALGIA: Primary | ICD-10-CM

## 2021-04-26 PROBLEM — I82.439 ACUTE DEEP VEIN THROMBOSIS (DVT) OF POPLITEAL VEIN: Status: RESOLVED | Noted: 2017-08-16 | Resolved: 2021-04-26

## 2021-04-26 PROCEDURE — 99999 PR PBB SHADOW E&M-EST. PATIENT-LVL IV: ICD-10-PCS | Mod: PBBFAC,,, | Performed by: FAMILY MEDICINE

## 2021-04-26 PROCEDURE — 99214 PR OFFICE/OUTPT VISIT, EST, LEVL IV, 30-39 MIN: ICD-10-PCS | Mod: 25,S$GLB,, | Performed by: FAMILY MEDICINE

## 2021-04-26 PROCEDURE — 99999 PR PBB SHADOW E&M-EST. PATIENT-LVL IV: CPT | Mod: PBBFAC,,, | Performed by: FAMILY MEDICINE

## 2021-04-26 PROCEDURE — 90732 PPSV23 VACC 2 YRS+ SUBQ/IM: CPT | Mod: S$GLB,,, | Performed by: FAMILY MEDICINE

## 2021-04-26 PROCEDURE — 99214 OFFICE O/P EST MOD 30 MIN: CPT | Mod: 25,S$GLB,, | Performed by: FAMILY MEDICINE

## 2021-04-26 PROCEDURE — 90732 PNEUMOCOCCAL POLYSACCHARIDE VACCINE 23-VALENT =>2YO SQ IM: ICD-10-PCS | Mod: S$GLB,,, | Performed by: FAMILY MEDICINE

## 2021-04-26 PROCEDURE — G0009 PNEUMOCOCCAL POLYSACCHARIDE VACCINE 23-VALENT =>2YO SQ IM: ICD-10-PCS | Mod: S$GLB,,, | Performed by: FAMILY MEDICINE

## 2021-04-26 PROCEDURE — G0009 ADMIN PNEUMOCOCCAL VACCINE: HCPCS | Mod: S$GLB,,, | Performed by: FAMILY MEDICINE

## 2021-04-26 RX ORDER — DICYCLOMINE HYDROCHLORIDE 20 MG/1
20 TABLET ORAL 3 TIMES DAILY PRN
Qty: 30 TABLET | Refills: 2 | Status: SHIPPED | OUTPATIENT
Start: 2021-04-26 | End: 2021-12-09 | Stop reason: SDUPTHER

## 2021-04-29 ENCOUNTER — OFFICE VISIT (OUTPATIENT)
Dept: NEUROLOGY | Facility: CLINIC | Age: 76
End: 2021-04-29
Payer: MEDICARE

## 2021-04-29 VITALS
TEMPERATURE: 98 F | HEIGHT: 64 IN | HEART RATE: 67 BPM | RESPIRATION RATE: 17 BRPM | SYSTOLIC BLOOD PRESSURE: 121 MMHG | WEIGHT: 143.06 LBS | DIASTOLIC BLOOD PRESSURE: 73 MMHG | BODY MASS INDEX: 24.42 KG/M2

## 2021-04-29 DIAGNOSIS — G44.221 CHRONIC TENSION-TYPE HEADACHE, INTRACTABLE: Primary | ICD-10-CM

## 2021-04-29 DIAGNOSIS — M79.18 CERVICAL MYOFASCIAL PAIN SYNDROME: ICD-10-CM

## 2021-04-29 PROCEDURE — 99999 PR PBB SHADOW E&M-EST. PATIENT-LVL IV: CPT | Mod: PBBFAC,,, | Performed by: PSYCHIATRY & NEUROLOGY

## 2021-04-29 PROCEDURE — 99214 PR OFFICE/OUTPT VISIT, EST, LEVL IV, 30-39 MIN: ICD-10-PCS | Mod: S$GLB,,, | Performed by: PSYCHIATRY & NEUROLOGY

## 2021-04-29 PROCEDURE — 99999 PR PBB SHADOW E&M-EST. PATIENT-LVL IV: ICD-10-PCS | Mod: PBBFAC,,, | Performed by: PSYCHIATRY & NEUROLOGY

## 2021-04-29 PROCEDURE — 99214 OFFICE O/P EST MOD 30 MIN: CPT | Mod: S$GLB,,, | Performed by: PSYCHIATRY & NEUROLOGY

## 2021-06-08 DIAGNOSIS — M25.571 RIGHT ANKLE PAIN, UNSPECIFIED CHRONICITY: ICD-10-CM

## 2021-06-08 DIAGNOSIS — M79.604 RIGHT LEG PAIN: Primary | ICD-10-CM

## 2021-06-09 ENCOUNTER — OFFICE VISIT (OUTPATIENT)
Dept: ORTHOPEDICS | Facility: CLINIC | Age: 76
End: 2021-06-09
Payer: MEDICARE

## 2021-06-09 ENCOUNTER — HOSPITAL ENCOUNTER (OUTPATIENT)
Dept: RADIOLOGY | Facility: HOSPITAL | Age: 76
Discharge: HOME | End: 2021-06-09
Attending: ORTHOPAEDIC SURGERY
Payer: MEDICARE

## 2021-06-09 VITALS — RESPIRATION RATE: 16 BRPM | HEIGHT: 64 IN | WEIGHT: 143 LBS | BODY MASS INDEX: 24.41 KG/M2

## 2021-06-09 DIAGNOSIS — M25.571 RIGHT ANKLE PAIN, UNSPECIFIED CHRONICITY: ICD-10-CM

## 2021-06-09 DIAGNOSIS — M25.571 RIGHT ANKLE PAIN, UNSPECIFIED CHRONICITY: Primary | ICD-10-CM

## 2021-06-09 DIAGNOSIS — M79.604 RIGHT LEG PAIN: Primary | ICD-10-CM

## 2021-06-09 DIAGNOSIS — M79.604 RIGHT LEG PAIN: ICD-10-CM

## 2021-06-09 PROCEDURE — 99203 PR OFFICE/OUTPT VISIT, NEW, LEVL III, 30-44 MIN: ICD-10-PCS | Mod: S$GLB,,, | Performed by: ORTHOPAEDIC SURGERY

## 2021-06-09 PROCEDURE — 99999 PR PBB SHADOW E&M-EST. PATIENT-LVL III: CPT | Mod: PBBFAC,,, | Performed by: ORTHOPAEDIC SURGERY

## 2021-06-09 PROCEDURE — 99203 OFFICE O/P NEW LOW 30 MIN: CPT | Mod: S$GLB,,, | Performed by: ORTHOPAEDIC SURGERY

## 2021-06-09 PROCEDURE — 73610 X-RAY EXAM OF ANKLE: CPT | Mod: TC,PN,RT

## 2021-06-09 PROCEDURE — 73610 XR ANKLE COMPLETE 3 VIEW RIGHT: ICD-10-PCS | Mod: 26,RT,, | Performed by: RADIOLOGY

## 2021-06-09 PROCEDURE — 73590 XR TIBIA FIBULA 2 VIEW RIGHT: ICD-10-PCS | Mod: 26,RT,, | Performed by: RADIOLOGY

## 2021-06-09 PROCEDURE — 73610 X-RAY EXAM OF ANKLE: CPT | Mod: 26,RT,, | Performed by: RADIOLOGY

## 2021-06-09 PROCEDURE — 73590 X-RAY EXAM OF LOWER LEG: CPT | Mod: TC,PN,RT

## 2021-06-09 PROCEDURE — 73590 X-RAY EXAM OF LOWER LEG: CPT | Mod: 26,RT,, | Performed by: RADIOLOGY

## 2021-06-09 PROCEDURE — 99999 PR PBB SHADOW E&M-EST. PATIENT-LVL III: ICD-10-PCS | Mod: PBBFAC,,, | Performed by: ORTHOPAEDIC SURGERY

## 2021-06-09 RX ORDER — MELOXICAM 15 MG/1
15 TABLET ORAL DAILY
Qty: 30 TABLET | Refills: 1 | Status: SHIPPED | OUTPATIENT
Start: 2021-06-09 | End: 2021-12-09 | Stop reason: ALTCHOICE

## 2021-06-09 RX ORDER — MELOXICAM 15 MG/1
15 TABLET ORAL DAILY
Qty: 30 TABLET | Refills: 1 | Status: SHIPPED | OUTPATIENT
Start: 2021-06-09 | End: 2021-06-09 | Stop reason: SDUPTHER

## 2021-08-08 ENCOUNTER — PATIENT OUTREACH (OUTPATIENT)
Dept: ADMINISTRATIVE | Facility: OTHER | Age: 76
End: 2021-08-08

## 2021-08-09 ENCOUNTER — OFFICE VISIT (OUTPATIENT)
Dept: NEUROLOGY | Facility: CLINIC | Age: 76
End: 2021-08-09
Payer: MEDICARE

## 2021-08-09 VITALS
DIASTOLIC BLOOD PRESSURE: 73 MMHG | HEART RATE: 62 BPM | RESPIRATION RATE: 17 BRPM | BODY MASS INDEX: 24.39 KG/M2 | HEIGHT: 64 IN | WEIGHT: 142.88 LBS | SYSTOLIC BLOOD PRESSURE: 127 MMHG

## 2021-08-09 DIAGNOSIS — G44.221 CHRONIC TENSION-TYPE HEADACHE, INTRACTABLE: Primary | ICD-10-CM

## 2021-08-09 DIAGNOSIS — M79.18 CERVICAL MYOFASCIAL PAIN SYNDROME: ICD-10-CM

## 2021-08-09 PROCEDURE — 99213 OFFICE O/P EST LOW 20 MIN: CPT | Mod: S$GLB,,, | Performed by: PSYCHIATRY & NEUROLOGY

## 2021-08-09 PROCEDURE — 99999 PR PBB SHADOW E&M-EST. PATIENT-LVL IV: ICD-10-PCS | Mod: PBBFAC,,, | Performed by: PSYCHIATRY & NEUROLOGY

## 2021-08-09 PROCEDURE — 99213 PR OFFICE/OUTPT VISIT, EST, LEVL III, 20-29 MIN: ICD-10-PCS | Mod: S$GLB,,, | Performed by: PSYCHIATRY & NEUROLOGY

## 2021-08-09 PROCEDURE — 99999 PR PBB SHADOW E&M-EST. PATIENT-LVL IV: CPT | Mod: PBBFAC,,, | Performed by: PSYCHIATRY & NEUROLOGY

## 2021-08-09 RX ORDER — TIZANIDINE 4 MG/1
4 TABLET ORAL NIGHTLY
Qty: 90 TABLET | Refills: 2 | Status: SHIPPED | OUTPATIENT
Start: 2021-08-09 | End: 2022-12-31

## 2021-09-08 ENCOUNTER — TELEPHONE (OUTPATIENT)
Dept: FAMILY MEDICINE | Facility: CLINIC | Age: 76
End: 2021-09-08

## 2021-09-09 ENCOUNTER — OFFICE VISIT (OUTPATIENT)
Dept: FAMILY MEDICINE | Facility: CLINIC | Age: 76
End: 2021-09-09
Payer: MEDICARE

## 2021-09-09 VITALS
BODY MASS INDEX: 24.39 KG/M2 | DIASTOLIC BLOOD PRESSURE: 68 MMHG | OXYGEN SATURATION: 94 % | HEART RATE: 76 BPM | WEIGHT: 142.88 LBS | TEMPERATURE: 99 F | SYSTOLIC BLOOD PRESSURE: 122 MMHG | HEIGHT: 64 IN

## 2021-09-09 DIAGNOSIS — C50.411 MALIGNANT NEOPLASM OF UPPER-OUTER QUADRANT OF RIGHT BREAST IN FEMALE, ESTROGEN RECEPTOR POSITIVE: ICD-10-CM

## 2021-09-09 DIAGNOSIS — Z17.0 MALIGNANT NEOPLASM OF UPPER-OUTER QUADRANT OF RIGHT BREAST IN FEMALE, ESTROGEN RECEPTOR POSITIVE: ICD-10-CM

## 2021-09-09 DIAGNOSIS — E78.49 OTHER HYPERLIPIDEMIA: ICD-10-CM

## 2021-09-09 DIAGNOSIS — Z00.00 ENCOUNTER FOR PREVENTIVE HEALTH EXAMINATION: Primary | ICD-10-CM

## 2021-09-09 DIAGNOSIS — I70.0 CALCIFICATION OF AORTA: ICD-10-CM

## 2021-09-09 PROCEDURE — 99999 PR PBB SHADOW E&M-EST. PATIENT-LVL V: CPT | Mod: PBBFAC,,, | Performed by: NURSE PRACTITIONER

## 2021-09-09 PROCEDURE — 99999 PR PBB SHADOW E&M-EST. PATIENT-LVL V: ICD-10-PCS | Mod: PBBFAC,,, | Performed by: NURSE PRACTITIONER

## 2021-09-09 PROCEDURE — G0439 PPPS, SUBSEQ VISIT: HCPCS | Mod: S$GLB,,, | Performed by: NURSE PRACTITIONER

## 2021-09-09 PROCEDURE — G0439 PR MEDICARE ANNUAL WELLNESS SUBSEQUENT VISIT: ICD-10-PCS | Mod: S$GLB,,, | Performed by: NURSE PRACTITIONER

## 2021-09-09 RX ORDER — MAGNESIUM 30 MG
400 TABLET ORAL ONCE
COMMUNITY

## 2021-09-09 RX ORDER — AMOXICILLIN 500 MG
CAPSULE ORAL DAILY
COMMUNITY

## 2021-09-28 ENCOUNTER — LAB VISIT (OUTPATIENT)
Dept: LAB | Facility: HOSPITAL | Age: 76
End: 2021-09-28
Attending: INTERNAL MEDICINE
Payer: MEDICARE

## 2021-09-28 DIAGNOSIS — C50.411 MALIGNANT NEOPLASM OF UPPER-OUTER QUADRANT OF RIGHT BREAST IN FEMALE, ESTROGEN RECEPTOR POSITIVE: ICD-10-CM

## 2021-09-28 DIAGNOSIS — Z79.811 LONG TERM (CURRENT) USE OF AROMATASE INHIBITORS: ICD-10-CM

## 2021-09-28 DIAGNOSIS — Z17.0 MALIGNANT NEOPLASM OF UPPER-OUTER QUADRANT OF RIGHT BREAST IN FEMALE, ESTROGEN RECEPTOR POSITIVE: ICD-10-CM

## 2021-09-28 LAB
ALBUMIN SERPL BCP-MCNC: 4 G/DL (ref 3.5–5.2)
ALP SERPL-CCNC: 68 U/L (ref 55–135)
ALT SERPL W/O P-5'-P-CCNC: 16 U/L (ref 10–44)
ANION GAP SERPL CALC-SCNC: 10 MMOL/L (ref 8–16)
AST SERPL-CCNC: 19 U/L (ref 10–40)
BASOPHILS # BLD AUTO: 0.03 K/UL (ref 0–0.2)
BASOPHILS NFR BLD: 0.6 % (ref 0–1.9)
BILIRUB SERPL-MCNC: 0.7 MG/DL (ref 0.1–1)
BUN SERPL-MCNC: 10 MG/DL (ref 8–23)
CALCIUM SERPL-MCNC: 9.6 MG/DL (ref 8.7–10.5)
CEA SERPL-MCNC: 0.9 NG/ML (ref 0–5)
CHLORIDE SERPL-SCNC: 108 MMOL/L (ref 95–110)
CO2 SERPL-SCNC: 24 MMOL/L (ref 23–29)
CREAT SERPL-MCNC: 0.7 MG/DL (ref 0.5–1.4)
DIFFERENTIAL METHOD: ABNORMAL
EOSINOPHIL # BLD AUTO: 0.1 K/UL (ref 0–0.5)
EOSINOPHIL NFR BLD: 1.7 % (ref 0–8)
ERYTHROCYTE [DISTWIDTH] IN BLOOD BY AUTOMATED COUNT: 12.3 % (ref 11.5–14.5)
EST. GFR  (AFRICAN AMERICAN): >60 ML/MIN/1.73 M^2
EST. GFR  (NON AFRICAN AMERICAN): >60 ML/MIN/1.73 M^2
GLUCOSE SERPL-MCNC: 103 MG/DL (ref 70–110)
HCT VFR BLD AUTO: 40.4 % (ref 37–48.5)
HGB BLD-MCNC: 13.5 G/DL (ref 12–16)
IMM GRANULOCYTES # BLD AUTO: 0.02 K/UL (ref 0–0.04)
IMM GRANULOCYTES NFR BLD AUTO: 0.4 % (ref 0–0.5)
LYMPHOCYTES # BLD AUTO: 1.9 K/UL (ref 1–4.8)
LYMPHOCYTES NFR BLD: 34.8 % (ref 18–48)
MCH RBC QN AUTO: 31.3 PG (ref 27–31)
MCHC RBC AUTO-ENTMCNC: 33.4 G/DL (ref 32–36)
MCV RBC AUTO: 94 FL (ref 82–98)
MONOCYTES # BLD AUTO: 0.4 K/UL (ref 0.3–1)
MONOCYTES NFR BLD: 8.1 % (ref 4–15)
NEUTROPHILS # BLD AUTO: 3 K/UL (ref 1.8–7.7)
NEUTROPHILS NFR BLD: 54.4 % (ref 38–73)
NRBC BLD-RTO: 0 /100 WBC
PLATELET # BLD AUTO: 278 K/UL (ref 150–450)
PMV BLD AUTO: 8.9 FL (ref 9.2–12.9)
POTASSIUM SERPL-SCNC: 4 MMOL/L (ref 3.5–5.1)
PROT SERPL-MCNC: 7 G/DL (ref 6–8.4)
RBC # BLD AUTO: 4.32 M/UL (ref 4–5.4)
SODIUM SERPL-SCNC: 142 MMOL/L (ref 136–145)
WBC # BLD AUTO: 5.41 K/UL (ref 3.9–12.7)

## 2021-09-28 PROCEDURE — 80053 COMPREHEN METABOLIC PANEL: CPT | Performed by: INTERNAL MEDICINE

## 2021-09-28 PROCEDURE — 85025 COMPLETE CBC W/AUTO DIFF WBC: CPT | Performed by: INTERNAL MEDICINE

## 2021-09-28 PROCEDURE — 82378 CARCINOEMBRYONIC ANTIGEN: CPT | Performed by: INTERNAL MEDICINE

## 2021-09-28 PROCEDURE — 86300 IMMUNOASSAY TUMOR CA 15-3: CPT | Performed by: INTERNAL MEDICINE

## 2021-09-28 PROCEDURE — 36415 COLL VENOUS BLD VENIPUNCTURE: CPT | Performed by: INTERNAL MEDICINE

## 2021-09-28 PROCEDURE — 86300 IMMUNOASSAY TUMOR CA 15-3: CPT | Mod: 91 | Performed by: INTERNAL MEDICINE

## 2021-09-29 LAB
CANCER AG15-3 SERPL-ACNC: 18.2 U/ML (ref 0–25)
CANCER AG27-29 SERPL-ACNC: 22 U/ML (ref 0–38.6)

## 2021-10-13 DIAGNOSIS — Z79.811 LONG TERM CURRENT USE OF AROMATASE INHIBITOR: Primary | ICD-10-CM

## 2021-10-13 DIAGNOSIS — C50.411 MALIGNANT NEOPLASM OF UPPER-OUTER QUADRANT OF RIGHT FEMALE BREAST: ICD-10-CM

## 2021-10-27 ENCOUNTER — CLINICAL SUPPORT (OUTPATIENT)
Dept: FAMILY MEDICINE | Facility: CLINIC | Age: 76
End: 2021-10-27
Payer: MEDICARE

## 2021-10-27 VITALS
SYSTOLIC BLOOD PRESSURE: 120 MMHG | OXYGEN SATURATION: 96 % | TEMPERATURE: 98 F | RESPIRATION RATE: 17 BRPM | DIASTOLIC BLOOD PRESSURE: 70 MMHG | HEART RATE: 73 BPM

## 2021-10-27 DIAGNOSIS — Z23 NEED FOR IMMUNIZATION AGAINST INFLUENZA: Primary | ICD-10-CM

## 2021-10-27 DIAGNOSIS — Z00.00 ANNUAL PHYSICAL EXAM: Primary | ICD-10-CM

## 2021-10-27 PROCEDURE — G0008 FLU VACCINE - QUADRIVALENT - ADJUVANTED: ICD-10-PCS | Mod: S$GLB,,, | Performed by: FAMILY MEDICINE

## 2021-10-27 PROCEDURE — G0008 ADMIN INFLUENZA VIRUS VAC: HCPCS | Mod: S$GLB,,, | Performed by: FAMILY MEDICINE

## 2021-10-27 PROCEDURE — 90694 FLU VACCINE - QUADRIVALENT - ADJUVANTED: ICD-10-PCS | Mod: S$GLB,,, | Performed by: FAMILY MEDICINE

## 2021-10-27 PROCEDURE — 90694 VACC AIIV4 NO PRSRV 0.5ML IM: CPT | Mod: S$GLB,,, | Performed by: FAMILY MEDICINE

## 2021-10-27 PROCEDURE — 99999 PR PBB SHADOW E&M-EST. PATIENT-LVL II: ICD-10-PCS | Mod: PBBFAC,,,

## 2021-10-27 PROCEDURE — 99999 PR PBB SHADOW E&M-EST. PATIENT-LVL II: CPT | Mod: PBBFAC,,,

## 2021-11-15 ENCOUNTER — HOSPITAL ENCOUNTER (OUTPATIENT)
Dept: RADIOLOGY | Facility: HOSPITAL | Age: 76
Discharge: HOME | End: 2021-11-15
Attending: INTERNAL MEDICINE
Payer: MEDICARE

## 2021-11-15 DIAGNOSIS — Z79.811 LONG TERM CURRENT USE OF AROMATASE INHIBITOR: ICD-10-CM

## 2021-11-15 DIAGNOSIS — C50.411 MALIGNANT NEOPLASM OF UPPER-OUTER QUADRANT OF RIGHT FEMALE BREAST: ICD-10-CM

## 2021-11-15 PROCEDURE — 77080 DXA BONE DENSITY AXIAL: CPT | Mod: TC,PO

## 2021-12-01 ENCOUNTER — TELEPHONE (OUTPATIENT)
Dept: FAMILY MEDICINE | Facility: CLINIC | Age: 76
End: 2021-12-01

## 2021-12-01 NOTE — TELEPHONE ENCOUNTER
I spoke with the pt and she is now scheduled to see Dr. Ford on Thursday December 9, 2021 at 2:40 pm.  Pt verbalized understanding

## 2021-12-01 NOTE — TELEPHONE ENCOUNTER
----- Message from Sheila Maya sent at 12/1/2021 10:28 AM CST -----  Contact: patient  Type:  Patient Returning Call    Who Called:  patient  Who Left Message for Patient:  Efrain  Does the patient know what this is regarding?:  changing appt  Best Call Back Number:  [phone] (home)     Additional Information:

## 2021-12-01 NOTE — TELEPHONE ENCOUNTER
Attempted to call pt to get appointment rescheduled due to Dr. Ford being out of clinic today.  No answer; Left message to call back

## 2021-12-09 ENCOUNTER — OFFICE VISIT (OUTPATIENT)
Dept: FAMILY MEDICINE | Facility: CLINIC | Age: 76
End: 2021-12-09
Payer: MEDICARE

## 2021-12-09 VITALS
RESPIRATION RATE: 16 BRPM | HEIGHT: 64 IN | BODY MASS INDEX: 24.62 KG/M2 | TEMPERATURE: 98 F | WEIGHT: 144.19 LBS | HEART RATE: 70 BPM | SYSTOLIC BLOOD PRESSURE: 122 MMHG | OXYGEN SATURATION: 97 % | DIASTOLIC BLOOD PRESSURE: 60 MMHG

## 2021-12-09 DIAGNOSIS — Z79.811 LONG TERM (CURRENT) USE OF AROMATASE INHIBITORS: ICD-10-CM

## 2021-12-09 DIAGNOSIS — I70.0 CALCIFICATION OF AORTA: ICD-10-CM

## 2021-12-09 DIAGNOSIS — R10.9 STOMACH ACHE: ICD-10-CM

## 2021-12-09 DIAGNOSIS — M25.571 RIGHT ANKLE PAIN, UNSPECIFIED CHRONICITY: ICD-10-CM

## 2021-12-09 DIAGNOSIS — E78.2 MIXED DYSLIPIDEMIA: Primary | ICD-10-CM

## 2021-12-09 PROCEDURE — 99213 OFFICE O/P EST LOW 20 MIN: CPT | Mod: S$GLB,,, | Performed by: FAMILY MEDICINE

## 2021-12-09 PROCEDURE — 99213 PR OFFICE/OUTPT VISIT, EST, LEVL III, 20-29 MIN: ICD-10-PCS | Mod: S$GLB,,, | Performed by: FAMILY MEDICINE

## 2021-12-09 PROCEDURE — 99999 PR PBB SHADOW E&M-EST. PATIENT-LVL IV: ICD-10-PCS | Mod: PBBFAC,,, | Performed by: FAMILY MEDICINE

## 2021-12-09 PROCEDURE — 99999 PR PBB SHADOW E&M-EST. PATIENT-LVL IV: CPT | Mod: PBBFAC,,, | Performed by: FAMILY MEDICINE

## 2021-12-09 RX ORDER — MELOXICAM 15 MG/1
15 TABLET ORAL DAILY PRN
Qty: 90 TABLET | Refills: 3 | Status: SHIPPED | OUTPATIENT
Start: 2021-12-09

## 2021-12-09 RX ORDER — DICYCLOMINE HYDROCHLORIDE 20 MG/1
20 TABLET ORAL 3 TIMES DAILY PRN
Qty: 90 TABLET | Refills: 1 | Status: SHIPPED | OUTPATIENT
Start: 2021-12-09 | End: 2024-03-25 | Stop reason: SDUPTHER

## 2022-01-18 ENCOUNTER — TELEPHONE (OUTPATIENT)
Dept: FAMILY MEDICINE | Facility: CLINIC | Age: 77
End: 2022-01-18
Payer: MEDICARE

## 2022-01-18 DIAGNOSIS — U07.1 RESPIRATORY TRACT INFECTION DUE TO COVID-19 VIRUS: Primary | ICD-10-CM

## 2022-01-18 DIAGNOSIS — J98.8 RESPIRATORY TRACT INFECTION DUE TO COVID-19 VIRUS: Primary | ICD-10-CM

## 2022-01-18 RX ORDER — MONTELUKAST SODIUM 10 MG/1
10 TABLET ORAL NIGHTLY
Qty: 30 TABLET | Refills: 0 | Status: SHIPPED | OUTPATIENT
Start: 2022-01-18 | End: 2022-02-10 | Stop reason: SDUPTHER

## 2022-01-18 NOTE — TELEPHONE ENCOUNTER
Spoke to pt states she tested positive 1/17/21. Pt states her symptoms began on Friday. Pt complains of sore throat, congestion, headache, and cough. Clear runny nose.    Pt denies nausea, vomiting, diarrhea, shortness of breath or trouble breathing.    Pt requesting antibody infusion

## 2022-01-18 NOTE — TELEPHONE ENCOUNTER
3 EUA ordered.  1. Start Ibuprofen 600 w/meals if pain or fevers.No more than 1 week to ten days.  2.Coricidn HBP q 4hrs for cough no more than 1 week.  3.Pepcid 20 x 2 a day for inflammation  4.Vit D 2,000 IU daily  5.Vit C 1000 mg  6.Zinc 40- 50 mg daily  7.Start Singulair 10 mg daily

## 2022-01-18 NOTE — TELEPHONE ENCOUNTER
----- Message from Sheila Maya sent at 1/18/2022  9:48 AM CST -----  Contact: patient  Type: Needs Medical Advice  Who Called:  patient  Symptoms (please be specific): head congestion, headache, body aches  How long has patient had these symptoms:  several days  Best Call Back Number [phone]  Additional Information: patients daughter gave her an at home covid test yesterday and it was positive. Please advise what to do next. Patient is requesting a call back

## 2022-01-19 ENCOUNTER — INFUSION (OUTPATIENT)
Dept: INFECTIOUS DISEASES | Facility: HOSPITAL | Age: 77
End: 2022-01-19
Attending: FAMILY MEDICINE
Payer: MEDICARE

## 2022-01-19 VITALS
DIASTOLIC BLOOD PRESSURE: 64 MMHG | TEMPERATURE: 98 F | RESPIRATION RATE: 16 BRPM | SYSTOLIC BLOOD PRESSURE: 142 MMHG | OXYGEN SATURATION: 96 % | HEART RATE: 71 BPM

## 2022-01-19 DIAGNOSIS — U07.1 COVID-19: Primary | ICD-10-CM

## 2022-01-19 DIAGNOSIS — U07.1 RESPIRATORY TRACT INFECTION DUE TO COVID-19 VIRUS: ICD-10-CM

## 2022-01-19 DIAGNOSIS — J98.8 RESPIRATORY TRACT INFECTION DUE TO COVID-19 VIRUS: ICD-10-CM

## 2022-01-19 PROCEDURE — 25000003 PHARM REV CODE 250: Performed by: FAMILY MEDICINE

## 2022-01-19 PROCEDURE — M0243 CASIRIVI AND IMDEVI INFUSION: HCPCS

## 2022-01-19 RX ORDER — ALBUTEROL SULFATE 90 UG/1
2 AEROSOL, METERED RESPIRATORY (INHALATION)
Status: DISCONTINUED | OUTPATIENT
Start: 2022-01-19 | End: 2022-06-14

## 2022-01-19 RX ORDER — ONDANSETRON 4 MG/1
4 TABLET, ORALLY DISINTEGRATING ORAL ONCE AS NEEDED
Status: DISCONTINUED | OUTPATIENT
Start: 2022-01-19 | End: 2022-12-31

## 2022-01-19 RX ORDER — DIPHENHYDRAMINE HYDROCHLORIDE 50 MG/ML
25 INJECTION INTRAMUSCULAR; INTRAVENOUS ONCE AS NEEDED
Status: ACTIVE | OUTPATIENT
Start: 2022-01-19 | End: 2033-06-17

## 2022-01-19 RX ORDER — SODIUM CHLORIDE 0.9 % (FLUSH) 0.9 %
10 SYRINGE (ML) INJECTION
Status: DISCONTINUED | OUTPATIENT
Start: 2022-01-19 | End: 2022-12-31

## 2022-01-19 RX ORDER — ACETAMINOPHEN 325 MG/1
650 TABLET ORAL ONCE AS NEEDED
Status: DISCONTINUED | OUTPATIENT
Start: 2022-01-19 | End: 2024-03-25

## 2022-01-19 RX ORDER — EPINEPHRINE 0.3 MG/.3ML
0.3 INJECTION SUBCUTANEOUS
Status: DISCONTINUED | OUTPATIENT
Start: 2022-01-19 | End: 2022-12-31

## 2022-01-19 RX ADMIN — SODIUM CHLORIDE: 0.9 INJECTION, SOLUTION INTRAVENOUS at 01:01

## 2022-01-27 NOTE — TELEPHONE ENCOUNTER
No new care gaps identified.  Powered by [employer]. Reference number: [id number].   1/27/2022 11:52:00 AM CST

## 2022-02-10 RX ORDER — MONTELUKAST SODIUM 10 MG/1
10 TABLET ORAL NIGHTLY
Qty: 90 TABLET | Refills: 3 | Status: SHIPPED | OUTPATIENT
Start: 2022-02-10 | End: 2023-06-22 | Stop reason: SDUPTHER

## 2022-04-05 ENCOUNTER — LAB VISIT (OUTPATIENT)
Dept: LAB | Facility: HOSPITAL | Age: 77
End: 2022-04-05
Attending: INTERNAL MEDICINE
Payer: MEDICARE

## 2022-04-05 DIAGNOSIS — Z17.0 MALIGNANT NEOPLASM OF UPPER-OUTER QUADRANT OF RIGHT BREAST IN FEMALE, ESTROGEN RECEPTOR POSITIVE: ICD-10-CM

## 2022-04-05 DIAGNOSIS — C50.411 MALIGNANT NEOPLASM OF UPPER-OUTER QUADRANT OF RIGHT BREAST IN FEMALE, ESTROGEN RECEPTOR POSITIVE: ICD-10-CM

## 2022-04-05 DIAGNOSIS — C50.411 MALIGNANT NEOPLASM OF UPPER-OUTER QUADRANT OF RIGHT FEMALE BREAST: Primary | ICD-10-CM

## 2022-04-05 LAB
ALBUMIN SERPL BCP-MCNC: 4 G/DL (ref 3.5–5.2)
ALP SERPL-CCNC: 71 U/L (ref 55–135)
ALT SERPL W/O P-5'-P-CCNC: 20 U/L (ref 10–44)
ANION GAP SERPL CALC-SCNC: 10 MMOL/L (ref 8–16)
AST SERPL-CCNC: 20 U/L (ref 10–40)
BASOPHILS # BLD AUTO: 0.03 K/UL (ref 0–0.2)
BASOPHILS NFR BLD: 0.4 % (ref 0–1.9)
BILIRUB SERPL-MCNC: 0.6 MG/DL (ref 0.1–1)
BUN SERPL-MCNC: 12 MG/DL (ref 8–23)
CALCIUM SERPL-MCNC: 9 MG/DL (ref 8.7–10.5)
CHLORIDE SERPL-SCNC: 103 MMOL/L (ref 95–110)
CO2 SERPL-SCNC: 24 MMOL/L (ref 23–29)
CREAT SERPL-MCNC: 0.5 MG/DL (ref 0.5–1.4)
DIFFERENTIAL METHOD: ABNORMAL
EOSINOPHIL # BLD AUTO: 0.1 K/UL (ref 0–0.5)
EOSINOPHIL NFR BLD: 1.6 % (ref 0–8)
ERYTHROCYTE [DISTWIDTH] IN BLOOD BY AUTOMATED COUNT: 12.4 % (ref 11.5–14.5)
EST. GFR  (AFRICAN AMERICAN): >60 ML/MIN/1.73 M^2
EST. GFR  (NON AFRICAN AMERICAN): >60 ML/MIN/1.73 M^2
GLUCOSE SERPL-MCNC: 113 MG/DL (ref 70–110)
HCT VFR BLD AUTO: 40.3 % (ref 37–48.5)
HGB BLD-MCNC: 13.3 G/DL (ref 12–16)
IMM GRANULOCYTES # BLD AUTO: 0.02 K/UL (ref 0–0.04)
IMM GRANULOCYTES NFR BLD AUTO: 0.3 % (ref 0–0.5)
LYMPHOCYTES # BLD AUTO: 2.1 K/UL (ref 1–4.8)
LYMPHOCYTES NFR BLD: 27 % (ref 18–48)
MCH RBC QN AUTO: 31.1 PG (ref 27–31)
MCHC RBC AUTO-ENTMCNC: 33 G/DL (ref 32–36)
MCV RBC AUTO: 94 FL (ref 82–98)
MONOCYTES # BLD AUTO: 0.5 K/UL (ref 0.3–1)
MONOCYTES NFR BLD: 6.8 % (ref 4–15)
NEUTROPHILS # BLD AUTO: 4.9 K/UL (ref 1.8–7.7)
NEUTROPHILS NFR BLD: 63.9 % (ref 38–73)
NRBC BLD-RTO: 0 /100 WBC
PLATELET # BLD AUTO: 299 K/UL (ref 150–450)
PMV BLD AUTO: 9.1 FL (ref 9.2–12.9)
POTASSIUM SERPL-SCNC: 3.5 MMOL/L (ref 3.5–5.1)
PROT SERPL-MCNC: 6.9 G/DL (ref 6–8.4)
RBC # BLD AUTO: 4.27 M/UL (ref 4–5.4)
SODIUM SERPL-SCNC: 137 MMOL/L (ref 136–145)
WBC # BLD AUTO: 7.64 K/UL (ref 3.9–12.7)

## 2022-04-05 PROCEDURE — 36415 COLL VENOUS BLD VENIPUNCTURE: CPT | Performed by: INTERNAL MEDICINE

## 2022-04-05 PROCEDURE — 80053 COMPREHEN METABOLIC PANEL: CPT | Performed by: INTERNAL MEDICINE

## 2022-04-05 PROCEDURE — 85025 COMPLETE CBC W/AUTO DIFF WBC: CPT | Performed by: INTERNAL MEDICINE

## 2022-04-11 ENCOUNTER — OFFICE VISIT (OUTPATIENT)
Dept: NEUROLOGY | Facility: CLINIC | Age: 77
End: 2022-04-11
Payer: MEDICARE

## 2022-04-11 VITALS
TEMPERATURE: 97 F | WEIGHT: 144.38 LBS | DIASTOLIC BLOOD PRESSURE: 70 MMHG | RESPIRATION RATE: 17 BRPM | HEIGHT: 64 IN | BODY MASS INDEX: 24.65 KG/M2 | HEART RATE: 63 BPM | SYSTOLIC BLOOD PRESSURE: 136 MMHG

## 2022-04-11 DIAGNOSIS — G43.119 INTRACTABLE MIGRAINE WITH AURA WITHOUT STATUS MIGRAINOSUS: ICD-10-CM

## 2022-04-11 DIAGNOSIS — M79.18 CERVICAL MYOFASCIAL PAIN SYNDROME: ICD-10-CM

## 2022-04-11 DIAGNOSIS — G44.221 CHRONIC TENSION-TYPE HEADACHE, INTRACTABLE: Primary | ICD-10-CM

## 2022-04-11 PROCEDURE — 1160F RVW MEDS BY RX/DR IN RCRD: CPT | Mod: CPTII,S$GLB,, | Performed by: PSYCHIATRY & NEUROLOGY

## 2022-04-11 PROCEDURE — 1159F MED LIST DOCD IN RCRD: CPT | Mod: CPTII,S$GLB,, | Performed by: PSYCHIATRY & NEUROLOGY

## 2022-04-11 PROCEDURE — 99999 PR PBB SHADOW E&M-EST. PATIENT-LVL V: ICD-10-PCS | Mod: PBBFAC,,, | Performed by: PSYCHIATRY & NEUROLOGY

## 2022-04-11 PROCEDURE — 3288F FALL RISK ASSESSMENT DOCD: CPT | Mod: CPTII,S$GLB,, | Performed by: PSYCHIATRY & NEUROLOGY

## 2022-04-11 PROCEDURE — 3078F DIAST BP <80 MM HG: CPT | Mod: CPTII,S$GLB,, | Performed by: PSYCHIATRY & NEUROLOGY

## 2022-04-11 PROCEDURE — 1101F PR PT FALLS ASSESS DOC 0-1 FALLS W/OUT INJ PAST YR: ICD-10-PCS | Mod: CPTII,S$GLB,, | Performed by: PSYCHIATRY & NEUROLOGY

## 2022-04-11 PROCEDURE — 3078F PR MOST RECENT DIASTOLIC BLOOD PRESSURE < 80 MM HG: ICD-10-PCS | Mod: CPTII,S$GLB,, | Performed by: PSYCHIATRY & NEUROLOGY

## 2022-04-11 PROCEDURE — 99214 OFFICE O/P EST MOD 30 MIN: CPT | Mod: S$GLB,,, | Performed by: PSYCHIATRY & NEUROLOGY

## 2022-04-11 PROCEDURE — 99214 PR OFFICE/OUTPT VISIT, EST, LEVL IV, 30-39 MIN: ICD-10-PCS | Mod: S$GLB,,, | Performed by: PSYCHIATRY & NEUROLOGY

## 2022-04-11 PROCEDURE — 99999 PR PBB SHADOW E&M-EST. PATIENT-LVL V: CPT | Mod: PBBFAC,,, | Performed by: PSYCHIATRY & NEUROLOGY

## 2022-04-11 PROCEDURE — 3075F SYST BP GE 130 - 139MM HG: CPT | Mod: CPTII,S$GLB,, | Performed by: PSYCHIATRY & NEUROLOGY

## 2022-04-11 PROCEDURE — 3288F PR FALLS RISK ASSESSMENT DOCUMENTED: ICD-10-PCS | Mod: CPTII,S$GLB,, | Performed by: PSYCHIATRY & NEUROLOGY

## 2022-04-11 PROCEDURE — 1126F PR PAIN SEVERITY QUANTIFIED, NO PAIN PRESENT: ICD-10-PCS | Mod: CPTII,S$GLB,, | Performed by: PSYCHIATRY & NEUROLOGY

## 2022-04-11 PROCEDURE — 1126F AMNT PAIN NOTED NONE PRSNT: CPT | Mod: CPTII,S$GLB,, | Performed by: PSYCHIATRY & NEUROLOGY

## 2022-04-11 PROCEDURE — 3075F PR MOST RECENT SYSTOLIC BLOOD PRESS GE 130-139MM HG: ICD-10-PCS | Mod: CPTII,S$GLB,, | Performed by: PSYCHIATRY & NEUROLOGY

## 2022-04-11 PROCEDURE — 1159F PR MEDICATION LIST DOCUMENTED IN MEDICAL RECORD: ICD-10-PCS | Mod: CPTII,S$GLB,, | Performed by: PSYCHIATRY & NEUROLOGY

## 2022-04-11 PROCEDURE — 1160F PR REVIEW ALL MEDS BY PRESCRIBER/CLIN PHARMACIST DOCUMENTED: ICD-10-PCS | Mod: CPTII,S$GLB,, | Performed by: PSYCHIATRY & NEUROLOGY

## 2022-04-11 PROCEDURE — 1101F PT FALLS ASSESS-DOCD LE1/YR: CPT | Mod: CPTII,S$GLB,, | Performed by: PSYCHIATRY & NEUROLOGY

## 2022-04-11 RX ORDER — NORTRIPTYLINE HYDROCHLORIDE 10 MG/1
CAPSULE ORAL
Qty: 60 CAPSULE | Refills: 6 | Status: SHIPPED | OUTPATIENT
Start: 2022-04-11 | End: 2022-06-20 | Stop reason: SDUPTHER

## 2022-04-11 NOTE — PROGRESS NOTES
[facility]    Chief complaint: chronic tension type headache     S:    77 y/o F with HTN, low back pain, and Hx of breast cancer (s/p surgical resection + radiation) on anastrozole, presenting to the clinic today for further evaluation of CTTH. She is here with her .   She continues on tizanidine 4mg nightly for TTH prevention. She states that despite tizanidine she continues to have very mild daily pain 1/10 in intensity at all times.  She mentions that she is doing overall better, but she does have about 2 times a week an escalation in the headache to mild to moderate headache she denies having any light/sound sensitivities or nausea. She finds tizanidine helps her sleep, but it's not resolving the full headache. She would be willing to make a change in her headache. The headache is left temporal to frontal area. She has no other new symptoms. She had COVID 19 on 1/22 and had a mab infusion no worsening of headache or new symptoms after infection, feels she completely recovered.       Headache history from initial visit on 1/21:  Age at onset and course over time: whole life (more frequent x2 year)  Location: left temple  Character: dull/pressure, and less throbbing  Worse during evenings   Intensity: 1-3 /10 on average and occasionally 9/10 (<1 time per month)  Frequency: daily  Duration: constant  Aura: Scotoma with only intense headaches (since young age, improved over the years, still having it every few months)  Associated symptoms: None, denies any light/sound/smell sensitivity, denies any physical activity aggravating headache or avoiding physical activity due to worsening headache  Precipitating factors: not unknown  Alleviating factors: medications  Aggravating factors: sneezing, sleep disturbance  Family history of headache: No  ER visits: 0   Sleep: no history of ADRIANO. Wakes up twice for bathroom. 6-7 hours of sleep/night    Medication  history:  Preventive:  Tizanidine 4mg- tolerating well:  helped over 50% improvement. S/E: odd dreams, but otherwise well tolerated.   Magnesium 400-800mg daily - helping     Acute treatment:  Tylenol 2-3 days a week.     Neuroimaging and other studies:   MRI Brain w/wo contrast (1/21):  Today discussed imaging with patient and looked over MRI findings with her and explained the images with her and    CLINICAL HISTORY:  new onset daily headache, history of breast CA;.  Headache, unspecified     TECHNIQUE:  Multiplanar multisequence MR imaging of the brain was performed before and after the administration of 6 mL Gadavist intravenous contrast.  Diffusion-weighted imaging was performed.  ADC map was generated.     COMPARISON:  None.     FINDINGS:  Intracranial compartment:     There is no acute or significant abnormality.  There is no intracranial hemorrhage.  There is no mass or mass effect.  There are no regions of restricted diffusion to suggest acute infarction.  There is only mild volume loss.  There is no hydrocephalus or midline shift.  There is a mild burden of periventricular and scattered subcortical white matter FLAIR and T2 hyperintense signal.  These findings are nonspecific but in a patient of this age, likely reflect sequelae of chronic small vessel disease.  There is no abnormal enhancement in the brain.  There is no abnormal extra-axial fluid collection.  The basilar cisterns are open.  Flow voids indicating patency are present in the major vessels at the base of the brain.  The cerebellar tonsils are normal position.  Sellar structures are normal.  The patient has had bilateral lens replacement surgery.  Otherwise, the orbits are normal.     Skull/extracranial contents:     Baseline marrow signal is normal.  The paranasal sinuses and mastoid air cells are clear.     Impression:     1. There is no acute or significant abnormality.  There is mild volume loss and nonspecific white matter change.   There is no intracranial hemorrhage, mass, mass effect, acute infarction or abnormal enhancement.    No changes to PMHx, surgical history, family history, and social history since last appt.     ROS: The fourteen point review of system was performed ringing in the ears, night sweats, joint pains (has been told related to arimidex)      Review of patient's allergies indicates:  No Known Allergies      Current Outpatient Medications:     anastrozole (ARIMIDEX) 1 mg Tab, Take 1 tablet (1 mg total) by mouth once daily., Disp: 90 tablet, Rfl: 3    azelastine (ASTELIN) 137 mcg (0.1 %) nasal spray, 1 spray (137 mcg total) by Nasal route 2 (two) times daily. (Patient taking differently: 1 spray by Nasal route 2 (two) times daily as needed. ), Disp: 30 mL, Rfl: 5    CALCIUM CARBONATE/VITAMIN D3 (CALCIUM 500 + D ORAL), Take 1 each by mouth daily as needed. , Disp: , Rfl:     dicyclomine (BENTYL) 20 mg tablet, Take 1 tablet (20 mg total) by mouth 3 (three) times daily as needed (stomache)., Disp: 90 tablet, Rfl: 1    magnesium 30 mg Tab, Take 400 mg by mouth once., Disp: , Rfl:     meloxicam (MOBIC) 15 MG tablet, Take 1 tablet (15 mg total) by mouth daily as needed for Pain., Disp: 90 tablet, Rfl: 3    METAMUCIL, SUGAR, Powd, Take 1 Package by mouth once daily., Disp: , Rfl: 0    montelukast (SINGULAIR) 10 mg tablet, Take 1 tablet (10 mg total) by mouth every evening., Disp: 90 tablet, Rfl: 3    multivitamin (THERAGRAN) per tablet, Take 1 tablet by mouth once daily., Disp: , Rfl:     omega-3 fatty acids/fish oil (FISH OIL-OMEGA-3 FATTY ACIDS) 300-1,000 mg capsule, Take by mouth once daily., Disp: , Rfl:     tiZANidine (ZANAFLEX) 4 MG tablet, Take 1 tablet (4 mg total) by mouth every evening., Disp: 90 tablet, Rfl: 2    triamcinolone acetonide 0.1% (KENALOG) 0.1 % cream, Apply topically daily as needed. , Disp: , Rfl: 3    Current Facility-Administered Medications:     acetaminophen tablet 650 mg, 650 mg, Oral,  Once PRN, Karthik Ford MD    albuterol inhaler 2 puff, 2 puff, Inhalation, Q20 Min PRN, Karthik Ford MD    diphenhydrAMINE injection 25 mg, 25 mg, Intravenous, Once PRN, Karthik Ford MD    EPINEPHrine (EPIPEN) 0.3 mg/0.3 mL pen injection 0.3 mg, 0.3 mg, Intramuscular, PRN, Karthik Ford MD    methylPREDNISolone sodium succinate injection 40 mg, 40 mg, Intravenous, Once PRN, Karthik Ford MD    ondansetron disintegrating tablet 4 mg, 4 mg, Oral, Once PRN, Karthik Ford MD    sodium chloride 0.9% 500 mL flush bag, , Intravenous, PRN, Karthik Ford MD, Stopped at 01/19/22 1504    sodium chloride 0.9% flush 10 mL, 10 mL, Intravenous, PRN, Karthik Ford MD      PHYSICAL EXAMINATION  Vitals:    04/11/22 1514   BP: 136/70   Pulse:    Resp:    Temp:    General: The patient is a well-developed, well-nourished looking of stated age in no acute distress.  +ttp over b/l trapezius and cervical paraspinals   NEUROLOGIC EXAM:  MENTAL: The patient is awake, alert and oriented times to time, place, location and situation. Intact recent memory, attention, concentration. Language and speech are normal. No aphasia, no dysarthria  CRANIAL NERVES:   EOMI, no facial asymmetry , VFFTC  MOTOR EXAM:5/5 in UE/LEs    CEREBELLAR EXAM: FTN  intact   DGAIT/STANCE: Standard gait was normal with normal stride, arm swing and turning. No gait ataxia noted     Impression:  Chronic tension type headaches with superimposed infrequent migraines with aura (life long) - symptoms have been improving significantly with tizanidine. She keeps a low grade 1/10 daily headache, but the mild to moderate intensity is about 2 days/week that she requires APAP for that resolves attacks. Given she continues to keep a daily headache and escalations up to moderate pain 2 times a week or so. We will transition from tizanidine to nortriptyline low dose. Discussed side effects of nortriptyline and we will be low dose nightly. Discussed to take and go to bed. If  she has side effects we can transition to another medication and including tizanidine again.     Cervical spondylosis/cervical myofascial pain - recurrent , trigger points in b/l trapezius today. Discussed theracane as option for the trigger points.     Migraine with aura- well controlled lately     Comorbidities:  - History of breast cancer   - chronic microvascular disease in brain- discussed heart healthy diet, Bps, doing well, PCP following lipid panel.     Plan:   1- For preventive management:    Discontinue Tizanidine 4 mg every night    Trial of nortriptyline 10mg nightly x  1 week, if tolerating well will increase then 20mg nightly and continue this dose. If difficulty tolerating then consider restarting tizanidine or trial of a new medication for prevention.    Continue  magnesium to 400 mg to 800 mg daily (can be done as 400mg twice a day- to see if it will aid with constipation and headache as well).    2- Acute management:               For breakthrough: tylenol as needed for headache treatment, no more than 14 days a month.     3- Keep headache diary      4- consider theracane for management of trigger points    RTC in 2 months.     Radha Tejeda MD   Board Certified Neurologist   [facility] Certified Headache Medicine

## 2022-04-11 NOTE — PATIENT INSTRUCTIONS
1- For preventive management:    Discontinue Tizanidine 4 mg every night    Trial of nortriptyline 10mg nightly x  1 week, if tolerating well will increase then 20mg nightly and continue this dose. If difficulty tolerating then consider restarting tizanidine or trial of a new medication for prevention.    Continue  magnesium to 400 mg to 800 mg daily (can be done as 400mg twice a day- to see if it will aid with constipation and headache as well).    2- Acute management:               For breakthrough: tylenol as needed for headache treatment, no more than 14 days a month.        3- consider theracane for management of trigger points

## 2022-04-28 ENCOUNTER — PATIENT MESSAGE (OUTPATIENT)
Dept: NEUROLOGY | Facility: CLINIC | Age: 77
End: 2022-04-28
Payer: MEDICARE

## 2022-06-14 ENCOUNTER — OFFICE VISIT (OUTPATIENT)
Dept: FAMILY MEDICINE | Facility: CLINIC | Age: 77
End: 2022-06-14
Payer: MEDICARE

## 2022-06-14 VITALS
HEART RATE: 83 BPM | DIASTOLIC BLOOD PRESSURE: 70 MMHG | SYSTOLIC BLOOD PRESSURE: 120 MMHG | WEIGHT: 140.44 LBS | TEMPERATURE: 98 F | BODY MASS INDEX: 23.98 KG/M2 | OXYGEN SATURATION: 97 % | HEIGHT: 64 IN

## 2022-06-14 DIAGNOSIS — J01.00 ACUTE NON-RECURRENT MAXILLARY SINUSITIS: ICD-10-CM

## 2022-06-14 DIAGNOSIS — J06.9 VIRAL URI WITH COUGH: Primary | ICD-10-CM

## 2022-06-14 PROCEDURE — 3074F PR MOST RECENT SYSTOLIC BLOOD PRESSURE < 130 MM HG: ICD-10-PCS | Mod: CPTII,S$GLB,, | Performed by: FAMILY MEDICINE

## 2022-06-14 PROCEDURE — 1159F MED LIST DOCD IN RCRD: CPT | Mod: CPTII,S$GLB,, | Performed by: FAMILY MEDICINE

## 2022-06-14 PROCEDURE — 1160F PR REVIEW ALL MEDS BY PRESCRIBER/CLIN PHARMACIST DOCUMENTED: ICD-10-PCS | Mod: CPTII,S$GLB,, | Performed by: FAMILY MEDICINE

## 2022-06-14 PROCEDURE — 3288F FALL RISK ASSESSMENT DOCD: CPT | Mod: CPTII,S$GLB,, | Performed by: FAMILY MEDICINE

## 2022-06-14 PROCEDURE — 1159F PR MEDICATION LIST DOCUMENTED IN MEDICAL RECORD: ICD-10-PCS | Mod: CPTII,S$GLB,, | Performed by: FAMILY MEDICINE

## 2022-06-14 PROCEDURE — 3078F DIAST BP <80 MM HG: CPT | Mod: CPTII,S$GLB,, | Performed by: FAMILY MEDICINE

## 2022-06-14 PROCEDURE — 1101F PR PT FALLS ASSESS DOC 0-1 FALLS W/OUT INJ PAST YR: ICD-10-PCS | Mod: CPTII,S$GLB,, | Performed by: FAMILY MEDICINE

## 2022-06-14 PROCEDURE — 1160F RVW MEDS BY RX/DR IN RCRD: CPT | Mod: CPTII,S$GLB,, | Performed by: FAMILY MEDICINE

## 2022-06-14 PROCEDURE — 99213 OFFICE O/P EST LOW 20 MIN: CPT | Mod: S$GLB,,, | Performed by: FAMILY MEDICINE

## 2022-06-14 PROCEDURE — 99999 PR PBB SHADOW E&M-EST. PATIENT-LVL IV: ICD-10-PCS | Mod: PBBFAC,,, | Performed by: FAMILY MEDICINE

## 2022-06-14 PROCEDURE — 99213 PR OFFICE/OUTPT VISIT, EST, LEVL III, 20-29 MIN: ICD-10-PCS | Mod: S$GLB,,, | Performed by: FAMILY MEDICINE

## 2022-06-14 PROCEDURE — 99999 PR PBB SHADOW E&M-EST. PATIENT-LVL IV: CPT | Mod: PBBFAC,,, | Performed by: FAMILY MEDICINE

## 2022-06-14 PROCEDURE — 3074F SYST BP LT 130 MM HG: CPT | Mod: CPTII,S$GLB,, | Performed by: FAMILY MEDICINE

## 2022-06-14 PROCEDURE — 3288F PR FALLS RISK ASSESSMENT DOCUMENTED: ICD-10-PCS | Mod: CPTII,S$GLB,, | Performed by: FAMILY MEDICINE

## 2022-06-14 PROCEDURE — 1101F PT FALLS ASSESS-DOCD LE1/YR: CPT | Mod: CPTII,S$GLB,, | Performed by: FAMILY MEDICINE

## 2022-06-14 PROCEDURE — 3078F PR MOST RECENT DIASTOLIC BLOOD PRESSURE < 80 MM HG: ICD-10-PCS | Mod: CPTII,S$GLB,, | Performed by: FAMILY MEDICINE

## 2022-06-14 RX ORDER — AZELASTINE 1 MG/ML
1 SPRAY, METERED NASAL 2 TIMES DAILY PRN
Qty: 30 ML | Refills: 4 | Status: SHIPPED | OUTPATIENT
Start: 2022-06-14 | End: 2024-03-25 | Stop reason: ALTCHOICE

## 2022-06-14 NOTE — TELEPHONE ENCOUNTER
No new care gaps identified.  [facility] Embedded Care Gaps. Reference number: [id number]. 6/14/2022   2:29:37 PM CDT

## 2022-06-14 NOTE — PATIENT INSTRUCTIONS
"Vitamin C 1,000 mg three times a day     Take both Benadryl 25 mg at bedtime and either Zyrtec or Claritin once a day - control the runny nose/eyes.    Get original, behind the counter Sudafed - 30 mg and use as needed for congestion (ear/nose/sinus) up to three times a day.     Cough - lozenges  and "Robitussin DM"    Contact your PCP if any worsening or for any new concerns such as infected sinus drainage or sputum as we discussed.   "

## 2022-06-14 NOTE — PROGRESS NOTES
"Subjective:       Patient ID: Liliana Hairston is a 76 y.o. female.    Chief Complaint: No chief complaint on file.    New to me patient here for UC visit.  Day 3 of C&C, clear runny nose and watery eyes.  Dry cough, no fever, SOB or pl pain.  Some gen HA and sinus pressure.  Covid negative here today.      Review of Systems   Constitutional: Negative for fever.   Respiratory: Negative for shortness of breath.    Cardiovascular: Negative for chest pain.   Gastrointestinal: Negative for abdominal pain and nausea.   Skin: Negative for rash.   All other systems reviewed and are negative.      Objective:      Physical Exam  Constitutional:       General: She is not in acute distress.     Appearance: She is well-developed.   HENT:      Right Ear: Tympanic membrane normal. Tympanic membrane is not erythematous.      Left Ear: Tympanic membrane normal. Tympanic membrane is not erythematous.      Nose: Mucosal edema present.      Right Sinus: No maxillary sinus tenderness.      Left Sinus: No maxillary sinus tenderness.      Mouth/Throat:      Pharynx: Posterior oropharyngeal erythema present.   Cardiovascular:      Rate and Rhythm: Normal rate and regular rhythm.      Heart sounds: No murmur heard.  Pulmonary:      Effort: Pulmonary effort is normal.      Breath sounds: Normal breath sounds.   Musculoskeletal:      Cervical back: Neck supple.   Lymphadenopathy:      Cervical: No cervical adenopathy.   Skin:     General: Skin is warm and dry.         Assessment:       1. Viral URI with cough        Plan:       Viral URI with cough      Patient Instructions   Vitamin C 1,000 mg three times a day     Take both Benadryl 25 mg at bedtime and either Zyrtec or Claritin once a day - control the runny nose/eyes.    Get original, behind the counter Sudafed - 30 mg and use as needed for congestion (ear/nose/sinus) up to three times a day.     Cough - lozenges  and "Robitussin DM"    Contact your PCP if any worsening or for any new " concerns such as infected sinus drainage or sputum as we discussed.

## 2022-06-14 NOTE — TELEPHONE ENCOUNTER
Patient seen today by Dr. Gonzalez for an UC visit. Patient requested refill for Astelin Nasal Spray. Please advise.

## 2022-06-20 ENCOUNTER — OFFICE VISIT (OUTPATIENT)
Dept: NEUROLOGY | Facility: CLINIC | Age: 77
End: 2022-06-20
Payer: MEDICARE

## 2022-06-20 VITALS
HEIGHT: 64 IN | WEIGHT: 140.88 LBS | RESPIRATION RATE: 17 BRPM | DIASTOLIC BLOOD PRESSURE: 81 MMHG | BODY MASS INDEX: 24.05 KG/M2 | HEART RATE: 76 BPM | SYSTOLIC BLOOD PRESSURE: 150 MMHG

## 2022-06-20 DIAGNOSIS — M79.18 CERVICAL MYOFASCIAL PAIN SYNDROME: ICD-10-CM

## 2022-06-20 DIAGNOSIS — G44.221 CHRONIC TENSION-TYPE HEADACHE, INTRACTABLE: Primary | ICD-10-CM

## 2022-06-20 PROCEDURE — 1160F PR REVIEW ALL MEDS BY PRESCRIBER/CLIN PHARMACIST DOCUMENTED: ICD-10-PCS | Mod: CPTII,S$GLB,, | Performed by: PSYCHIATRY & NEUROLOGY

## 2022-06-20 PROCEDURE — 99999 PR PBB SHADOW E&M-EST. PATIENT-LVL V: ICD-10-PCS | Mod: PBBFAC,,, | Performed by: PSYCHIATRY & NEUROLOGY

## 2022-06-20 PROCEDURE — 3077F SYST BP >= 140 MM HG: CPT | Mod: CPTII,S$GLB,, | Performed by: PSYCHIATRY & NEUROLOGY

## 2022-06-20 PROCEDURE — 99213 PR OFFICE/OUTPT VISIT, EST, LEVL III, 20-29 MIN: ICD-10-PCS | Mod: S$GLB,,, | Performed by: PSYCHIATRY & NEUROLOGY

## 2022-06-20 PROCEDURE — 1126F AMNT PAIN NOTED NONE PRSNT: CPT | Mod: CPTII,S$GLB,, | Performed by: PSYCHIATRY & NEUROLOGY

## 2022-06-20 PROCEDURE — 3079F DIAST BP 80-89 MM HG: CPT | Mod: CPTII,S$GLB,, | Performed by: PSYCHIATRY & NEUROLOGY

## 2022-06-20 PROCEDURE — 1159F PR MEDICATION LIST DOCUMENTED IN MEDICAL RECORD: ICD-10-PCS | Mod: CPTII,S$GLB,, | Performed by: PSYCHIATRY & NEUROLOGY

## 2022-06-20 PROCEDURE — 99213 OFFICE O/P EST LOW 20 MIN: CPT | Mod: S$GLB,,, | Performed by: PSYCHIATRY & NEUROLOGY

## 2022-06-20 PROCEDURE — 1126F PR PAIN SEVERITY QUANTIFIED, NO PAIN PRESENT: ICD-10-PCS | Mod: CPTII,S$GLB,, | Performed by: PSYCHIATRY & NEUROLOGY

## 2022-06-20 PROCEDURE — 99999 PR PBB SHADOW E&M-EST. PATIENT-LVL V: CPT | Mod: PBBFAC,,, | Performed by: PSYCHIATRY & NEUROLOGY

## 2022-06-20 PROCEDURE — 1101F PT FALLS ASSESS-DOCD LE1/YR: CPT | Mod: CPTII,S$GLB,, | Performed by: PSYCHIATRY & NEUROLOGY

## 2022-06-20 PROCEDURE — 3288F PR FALLS RISK ASSESSMENT DOCUMENTED: ICD-10-PCS | Mod: CPTII,S$GLB,, | Performed by: PSYCHIATRY & NEUROLOGY

## 2022-06-20 PROCEDURE — 3079F PR MOST RECENT DIASTOLIC BLOOD PRESSURE 80-89 MM HG: ICD-10-PCS | Mod: CPTII,S$GLB,, | Performed by: PSYCHIATRY & NEUROLOGY

## 2022-06-20 PROCEDURE — 3077F PR MOST RECENT SYSTOLIC BLOOD PRESSURE >= 140 MM HG: ICD-10-PCS | Mod: CPTII,S$GLB,, | Performed by: PSYCHIATRY & NEUROLOGY

## 2022-06-20 PROCEDURE — 1160F RVW MEDS BY RX/DR IN RCRD: CPT | Mod: CPTII,S$GLB,, | Performed by: PSYCHIATRY & NEUROLOGY

## 2022-06-20 PROCEDURE — 1101F PR PT FALLS ASSESS DOC 0-1 FALLS W/OUT INJ PAST YR: ICD-10-PCS | Mod: CPTII,S$GLB,, | Performed by: PSYCHIATRY & NEUROLOGY

## 2022-06-20 PROCEDURE — 1159F MED LIST DOCD IN RCRD: CPT | Mod: CPTII,S$GLB,, | Performed by: PSYCHIATRY & NEUROLOGY

## 2022-06-20 PROCEDURE — 3288F FALL RISK ASSESSMENT DOCD: CPT | Mod: CPTII,S$GLB,, | Performed by: PSYCHIATRY & NEUROLOGY

## 2022-06-20 RX ORDER — NORTRIPTYLINE HYDROCHLORIDE 10 MG/1
CAPSULE ORAL
Qty: 90 CAPSULE | Refills: 6 | Status: SHIPPED | OUTPATIENT
Start: 2022-06-20 | End: 2022-12-06 | Stop reason: SDUPTHER

## 2022-06-20 NOTE — PROGRESS NOTES
[facility]    Chief complaint: chronic tension type headache     S:    75 y/o F with HTN, low back pain, and Hx of breast cancer (s/p surgical resection + radiation) on anastrozole, presenting to the clinic today for further evaluation of CTTH. She initially felt there was an improvement in the frequency of headache, very seldom headache, very mild headache, not overnight anymore. She mentions that for about 1 month now she had nasal congestion and sinus drainage. She mentions that she began having more URI symptoms after a wedding. She has significant pressure now and forehead pain. She has noted an increase in the headache the last week since having had the URI. She is slowly improving from the URI. She felt that when she first started the norti    She mentions that she did do a covid test and was negative last week at PCP.   Headache history from initial visit on 1/21:  Age at onset and course over time: whole life (more frequent x2 year)  Location: left temple  Character: dull/pressure, and less throbbing  Worse during evenings   Intensity: 1-3 /10 on average and occasionally 9/10 (<1 time per month)  Frequency: daily  Duration: constant  Aura: Scotoma with only intense headaches (since young age, improved over the years, still having it every few months)  Associated symptoms: None, denies any light/sound/smell sensitivity, denies any physical activity aggravating headache or avoiding physical activity due to worsening headache  Precipitating factors: not unknown  Alleviating factors: medications  Aggravating factors: sneezing, sleep disturbance  Family history of headache: No  ER visits: 0   Sleep: no history of ADRIANO. Wakes up twice for bathroom. 6-7 hours of sleep/night    Medication history:  Preventive:  Tizanidine 4mg- tolerating well:  helped over 50% improvement. S/E: odd dreams, but otherwise well tolerated.   Magnesium 400-800mg daily - helping     Acute  treatment:  Tylenol 2-3 days a week.     Neuroimaging and other studies:   MRI Brain w/wo contrast (1/21):  Today discussed imaging with patient and looked over MRI findings with her and explained the images with her and    CLINICAL HISTORY:  new onset daily headache, history of breast CA;.  Headache, unspecified     TECHNIQUE:  Multiplanar multisequence MR imaging of the brain was performed before and after the administration of 6 mL Gadavist intravenous contrast.  Diffusion-weighted imaging was performed.  ADC map was generated.     COMPARISON:  None.     FINDINGS:  Intracranial compartment:     There is no acute or significant abnormality.  There is no intracranial hemorrhage.  There is no mass or mass effect.  There are no regions of restricted diffusion to suggest acute infarction.  There is only mild volume loss.  There is no hydrocephalus or midline shift.  There is a mild burden of periventricular and scattered subcortical white matter FLAIR and T2 hyperintense signal.  These findings are nonspecific but in a patient of this age, likely reflect sequelae of chronic small vessel disease.  There is no abnormal enhancement in the brain.  There is no abnormal extra-axial fluid collection.  The basilar cisterns are open.  Flow voids indicating patency are present in the major vessels at the base of the brain.  The cerebellar tonsils are normal position.  Sellar structures are normal.  The patient has had bilateral lens replacement surgery.  Otherwise, the orbits are normal.     Skull/extracranial contents:     Baseline marrow signal is normal.  The paranasal sinuses and mastoid air cells are clear.     Impression:     1. There is no acute or significant abnormality.  There is mild volume loss and nonspecific white matter change.  There is no intracranial hemorrhage, mass, mass effect, acute infarction or abnormal enhancement.    No changes to PMHx, surgical history, family history, and social history since  last appt.     ROS: The fourteen point review of system was performed ringing in the ears, night sweats, joint pains (has been told related to arimidex)      Review of patient's allergies indicates:  No Known Allergies      Current Outpatient Medications:     anastrozole (ARIMIDEX) 1 mg Tab, Take 1 tablet (1 mg total) by mouth once daily., Disp: 90 tablet, Rfl: 3    azelastine (ASTELIN) 137 mcg (0.1 %) nasal spray, 1 spray (137 mcg total) by Nasal route 2 (two) times daily as needed for Rhinitis., Disp: 30 mL, Rfl: 4    CALCIUM CARBONATE/VITAMIN D3 (CALCIUM 500 + D ORAL), Take 1 each by mouth daily as needed. , Disp: , Rfl:     dicyclomine (BENTYL) 20 mg tablet, Take 1 tablet (20 mg total) by mouth 3 (three) times daily as needed (stomache)., Disp: 90 tablet, Rfl: 1    magnesium 30 mg Tab, Take 400 mg by mouth once., Disp: , Rfl:     meloxicam (MOBIC) 15 MG tablet, Take 1 tablet (15 mg total) by mouth daily as needed for Pain., Disp: 90 tablet, Rfl: 3    METAMUCIL, SUGAR, Powd, Take 1 Package by mouth once daily., Disp: , Rfl: 0    montelukast (SINGULAIR) 10 mg tablet, Take 1 tablet (10 mg total) by mouth every evening., Disp: 90 tablet, Rfl: 3    multivitamin (THERAGRAN) per tablet, Take 1 tablet by mouth once daily., Disp: , Rfl:     nortriptyline (PAMELOR) 10 MG capsule, 1 po qhs x 1 week, then 2 po qhs, Disp: 60 capsule, Rfl: 6    omega-3 fatty acids/fish oil (FISH OIL-OMEGA-3 FATTY ACIDS) 300-1,000 mg capsule, Take by mouth once daily., Disp: , Rfl:     tiZANidine (ZANAFLEX) 4 MG tablet, Take 1 tablet (4 mg total) by mouth every evening., Disp: 90 tablet, Rfl: 2    triamcinolone acetonide 0.1% (KENALOG) 0.1 % cream, Apply topically daily as needed. , Disp: , Rfl: 3    Current Facility-Administered Medications:     acetaminophen tablet 650 mg, 650 mg, Oral, Once PRN, Karthik Ford MD    diphenhydrAMINE injection 25 mg, 25 mg, Intravenous, Once PRN, Karthik Ford MD    EPINEPHrine (EPIPEN) 0.3  mg/0.3 mL pen injection 0.3 mg, 0.3 mg, Intramuscular, PRN, Karthik Ford MD    methylPREDNISolone sodium succinate injection 40 mg, 40 mg, Intravenous, Once PRN, Karthik Ford MD    ondansetron disintegrating tablet 4 mg, 4 mg, Oral, Once PRN, Karthik Ford MD    sodium chloride 0.9% 500 mL flush bag, , Intravenous, PRN, Karthik Ford MD, Stopped at 01/19/22 1504    sodium chloride 0.9% flush 10 mL, 10 mL, Intravenous, PRN, Karthik Ford MD      PHYSICAL EXAMINATION  Vitals:    06/20/22 1506   BP: (!) 150/81   Pulse: 76   Resp: 17   General: The patient is a well-developed, well-nourished looking of stated age in no acute distress.  +ttp over b/l trapezius and cervical paraspinals   NEUROLOGIC EXAM:  MENTAL: The patient is awake, alert and oriented times to time, place, location and situation. Intact recent memory, attention, concentration. Language and speech are normal. No aphasia, no dysarthria  CRANIAL NERVES:   EOMI, no facial asymmetry   MOTOR EXAM: moving extremities symmetrically    CEREBELLAR EXAM: no observable dysmetria on UEs  DGAIT/STANCE: Standard gait was normal with normal stride, arm swing and turning. No gait ataxia noted     Impression:  Chronic tension type headaches with superimposed infrequent migraines with aura (life long) - despite symptoms have been improving significantly with tizanidine she kept a low grade 1/10 daily headache, but the mild to moderate intensity is about 2 days/week that she requires APAP for that resolves attacks. Given she continues to keep a daily headache and escalations up to moderate pain 2 times a week or so she was transitioned to nortriptyline. She was doing nortriptyline 20 mg nightly and she was mainly headache free on the nortriptyline. The last few weks she has been sick with more nasal congestion and the last week she had increase in pain, but it's mainly facial pressure and significant congestion likely secondary to the URI.  For now we will continue  current regimen of 20mg, but could increase to 30mg if needed.     Cervical spondylosis/cervical myofascial pain -  Improved     Migraine with aura- well controlled lately , denies any recurrence.     Comorbidities:  - History of breast cancer   - chronic microvascular disease in brain    Plan:   1- For preventive management:    Continue notriptyline 20mg nightly, can consider going up to 30 mg nightly    Continue  magnesium to 400 mg to 500 mg daily     2- Acute management:               For breakthrough: tylenol as needed for headache treatment, no more than 14 days a month.     3- Keep headache diary      RTC in 3 months, patient preference.     Radha Tejeda MD   Board Certified Neurologist   [facility] Certified Headache Medicine

## 2022-06-20 NOTE — PATIENT INSTRUCTIONS
1- For preventive management:    Continue notriptyline 20mg nightly, can consider going up to 30 mg nightly    Continue  magnesium to 400 mg     2- Acute management:               For breakthrough: tylenol as needed for headache treatment, no more than 14 days a month.

## 2022-09-19 ENCOUNTER — IMMUNIZATION (OUTPATIENT)
Dept: PRIMARY CARE CLINIC | Facility: CLINIC | Age: 77
End: 2022-09-19
Payer: MEDICARE

## 2022-09-19 DIAGNOSIS — Z23 NEED FOR VACCINATION: Primary | ICD-10-CM

## 2022-09-19 PROCEDURE — 0124A COVID-19, MRNA, LNP-S, BIVALENT BOOSTER, PF, 30 MCG/0.3 ML DOSE: ICD-10-PCS | Mod: S$GLB,,, | Performed by: FAMILY MEDICINE

## 2022-09-19 PROCEDURE — 91312 COVID-19, MRNA, LNP-S, BIVALENT BOOSTER, PF, 30 MCG/0.3 ML DOSE: ICD-10-PCS | Mod: S$GLB,,, | Performed by: FAMILY MEDICINE

## 2022-09-19 PROCEDURE — 91312 COVID-19, MRNA, LNP-S, BIVALENT BOOSTER, PF, 30 MCG/0.3 ML DOSE: CPT | Mod: S$GLB,,, | Performed by: FAMILY MEDICINE

## 2022-09-19 PROCEDURE — 0124A COVID-19, MRNA, LNP-S, BIVALENT BOOSTER, PF, 30 MCG/0.3 ML DOSE: CPT | Mod: S$GLB,,, | Performed by: FAMILY MEDICINE

## 2022-09-20 ENCOUNTER — OFFICE VISIT (OUTPATIENT)
Dept: NEUROLOGY | Facility: CLINIC | Age: 77
End: 2022-09-20
Payer: MEDICARE

## 2022-09-20 VITALS
DIASTOLIC BLOOD PRESSURE: 78 MMHG | SYSTOLIC BLOOD PRESSURE: 117 MMHG | HEIGHT: 64 IN | BODY MASS INDEX: 23.9 KG/M2 | RESPIRATION RATE: 17 BRPM | HEART RATE: 85 BPM | WEIGHT: 140 LBS

## 2022-09-20 DIAGNOSIS — G43.119 INTRACTABLE MIGRAINE WITH AURA WITHOUT STATUS MIGRAINOSUS: ICD-10-CM

## 2022-09-20 DIAGNOSIS — G44.221 CHRONIC TENSION-TYPE HEADACHE, INTRACTABLE: Primary | ICD-10-CM

## 2022-09-20 PROCEDURE — 3288F PR FALLS RISK ASSESSMENT DOCUMENTED: ICD-10-PCS | Mod: CPTII,S$GLB,, | Performed by: PSYCHIATRY & NEUROLOGY

## 2022-09-20 PROCEDURE — 1160F RVW MEDS BY RX/DR IN RCRD: CPT | Mod: CPTII,S$GLB,, | Performed by: PSYCHIATRY & NEUROLOGY

## 2022-09-20 PROCEDURE — 99999 PR PBB SHADOW E&M-EST. PATIENT-LVL V: ICD-10-PCS | Mod: PBBFAC,,, | Performed by: PSYCHIATRY & NEUROLOGY

## 2022-09-20 PROCEDURE — 1101F PT FALLS ASSESS-DOCD LE1/YR: CPT | Mod: CPTII,S$GLB,, | Performed by: PSYCHIATRY & NEUROLOGY

## 2022-09-20 PROCEDURE — 1101F PR PT FALLS ASSESS DOC 0-1 FALLS W/OUT INJ PAST YR: ICD-10-PCS | Mod: CPTII,S$GLB,, | Performed by: PSYCHIATRY & NEUROLOGY

## 2022-09-20 PROCEDURE — 3074F SYST BP LT 130 MM HG: CPT | Mod: CPTII,S$GLB,, | Performed by: PSYCHIATRY & NEUROLOGY

## 2022-09-20 PROCEDURE — 1159F PR MEDICATION LIST DOCUMENTED IN MEDICAL RECORD: ICD-10-PCS | Mod: CPTII,S$GLB,, | Performed by: PSYCHIATRY & NEUROLOGY

## 2022-09-20 PROCEDURE — 99213 OFFICE O/P EST LOW 20 MIN: CPT | Mod: S$GLB,,, | Performed by: PSYCHIATRY & NEUROLOGY

## 2022-09-20 PROCEDURE — 3288F FALL RISK ASSESSMENT DOCD: CPT | Mod: CPTII,S$GLB,, | Performed by: PSYCHIATRY & NEUROLOGY

## 2022-09-20 PROCEDURE — 3078F DIAST BP <80 MM HG: CPT | Mod: CPTII,S$GLB,, | Performed by: PSYCHIATRY & NEUROLOGY

## 2022-09-20 PROCEDURE — 1160F PR REVIEW ALL MEDS BY PRESCRIBER/CLIN PHARMACIST DOCUMENTED: ICD-10-PCS | Mod: CPTII,S$GLB,, | Performed by: PSYCHIATRY & NEUROLOGY

## 2022-09-20 PROCEDURE — 99213 PR OFFICE/OUTPT VISIT, EST, LEVL III, 20-29 MIN: ICD-10-PCS | Mod: S$GLB,,, | Performed by: PSYCHIATRY & NEUROLOGY

## 2022-09-20 PROCEDURE — 99999 PR PBB SHADOW E&M-EST. PATIENT-LVL V: CPT | Mod: PBBFAC,,, | Performed by: PSYCHIATRY & NEUROLOGY

## 2022-09-20 PROCEDURE — 3074F PR MOST RECENT SYSTOLIC BLOOD PRESSURE < 130 MM HG: ICD-10-PCS | Mod: CPTII,S$GLB,, | Performed by: PSYCHIATRY & NEUROLOGY

## 2022-09-20 PROCEDURE — 3078F PR MOST RECENT DIASTOLIC BLOOD PRESSURE < 80 MM HG: ICD-10-PCS | Mod: CPTII,S$GLB,, | Performed by: PSYCHIATRY & NEUROLOGY

## 2022-09-20 PROCEDURE — 1126F AMNT PAIN NOTED NONE PRSNT: CPT | Mod: CPTII,S$GLB,, | Performed by: PSYCHIATRY & NEUROLOGY

## 2022-09-20 PROCEDURE — 1159F MED LIST DOCD IN RCRD: CPT | Mod: CPTII,S$GLB,, | Performed by: PSYCHIATRY & NEUROLOGY

## 2022-09-20 PROCEDURE — 1126F PR PAIN SEVERITY QUANTIFIED, NO PAIN PRESENT: ICD-10-PCS | Mod: CPTII,S$GLB,, | Performed by: PSYCHIATRY & NEUROLOGY

## 2022-09-20 NOTE — PROGRESS NOTES
[facility]- Headache Clinic    Chief complaint: chronic tension type headache     S:    77 y/o F with HTN, low back pain, and Hx of breast cancer (s/p surgical resection + radiation) on anastrozole, presenting to the clinic today for further evaluation of CTTH. She mentions that she is currently on 30mg nightly from 20mg she had been on last visit. She did not notice a big difference from 20mg nortriptyline to 30mg nightly. Things are better than before. Not really having them daily or the severe attacks like before. She is having at most 4-5 days/month and they are currently 1/10 in intensity at most. She mentions that 20mg worked just as well as 30mg. She no longer is having the migraine attacks overnight like she was doing. She mentions that she has noted that she has had more dry eyes/dry mouth on the higher dose of nortriptyline thus she would be willing to cut down to 20mg nightly. She has continued on magnesium 500 mg daily. Otherwise health wise things are stable. She would like ot continue nortriptyline 20mg for prevention.      Headache history from initial visit on 1/21:  Age at onset and course over time: whole life (more frequent x2 year)  Location: left temple  Character: dull/pressure, and less throbbing  Worse during evenings   Intensity: 1-3 /10 on average and occasionally 9/10 (<1 time per month)  Frequency: daily  Duration: constant  Aura: Scotoma with only intense headaches (since young age, improved over the years, still having it every few months)  Associated symptoms: None, denies any light/sound/smell sensitivity, denies any physical activity aggravating headache or avoiding physical activity due to worsening headache  Precipitating factors: not unknown  Alleviating factors: medications  Aggravating factors: sneezing, sleep disturbance  Family history of headache: No  ER visits: 0   Sleep: no history of ADRIANO. Wakes up twice for bathroom. 6-7 hours of  sleep/night    Medication history:  Preventive:  Tizanidine 4mg- tolerating well:  helped over 50% improvement. S/E: odd dreams, but otherwise well tolerated.   Magnesium 400-800mg daily - helping     Acute treatment:  Tylenol 2-3 days a week.     Neuroimaging and other studies:   MRI Brain w/wo contrast (1/21):  Today discussed imaging with patient and looked over MRI findings with her and explained the images with her and    CLINICAL HISTORY:  new onset daily headache, history of breast CA;.  Headache, unspecified     TECHNIQUE:  Multiplanar multisequence MR imaging of the brain was performed before and after the administration of 6 mL Gadavist intravenous contrast.  Diffusion-weighted imaging was performed.  ADC map was generated.     COMPARISON:  None.     FINDINGS:  Intracranial compartment:     There is no acute or significant abnormality.  There is no intracranial hemorrhage.  There is no mass or mass effect.  There are no regions of restricted diffusion to suggest acute infarction.  There is only mild volume loss.  There is no hydrocephalus or midline shift.  There is a mild burden of periventricular and scattered subcortical white matter FLAIR and T2 hyperintense signal.  These findings are nonspecific but in a patient of this age, likely reflect sequelae of chronic small vessel disease.  There is no abnormal enhancement in the brain.  There is no abnormal extra-axial fluid collection.  The basilar cisterns are open.  Flow voids indicating patency are present in the major vessels at the base of the brain.  The cerebellar tonsils are normal position.  Sellar structures are normal.  The patient has had bilateral lens replacement surgery.  Otherwise, the orbits are normal.     Skull/extracranial contents:     Baseline marrow signal is normal.  The paranasal sinuses and mastoid air cells are clear.     Impression:     1. There is no acute or significant abnormality.  There is mild volume loss and  nonspecific white matter change.  There is no intracranial hemorrhage, mass, mass effect, acute infarction or abnormal enhancement.    No changes to PMHx, surgical history, family history, and social history since last appt.     ROS: The fourteen point review of system was performed ringing in the ears, night sweats, joint pains (has been told related to arimidex)      Review of patient's allergies indicates:  No Known Allergies      Current Outpatient Medications:     anastrozole (ARIMIDEX) 1 mg Tab, Take 1 tablet (1 mg total) by mouth once daily., Disp: 90 tablet, Rfl: 3    azelastine (ASTELIN) 137 mcg (0.1 %) nasal spray, 1 spray (137 mcg total) by Nasal route 2 (two) times daily as needed for Rhinitis., Disp: 30 mL, Rfl: 4    CALCIUM CARBONATE/VITAMIN D3 (CALCIUM 500 + D ORAL), Take 1 each by mouth daily as needed. , Disp: , Rfl:     dicyclomine (BENTYL) 20 mg tablet, Take 1 tablet (20 mg total) by mouth 3 (three) times daily as needed (stomache)., Disp: 90 tablet, Rfl: 1    magnesium 30 mg Tab, Take 400 mg by mouth once., Disp: , Rfl:     meloxicam (MOBIC) 15 MG tablet, Take 1 tablet (15 mg total) by mouth daily as needed for Pain., Disp: 90 tablet, Rfl: 3    METAMUCIL, SUGAR, Powd, Take 1 Package by mouth once daily., Disp: , Rfl: 0    montelukast (SINGULAIR) 10 mg tablet, Take 1 tablet (10 mg total) by mouth every evening., Disp: 90 tablet, Rfl: 3    multivitamin (THERAGRAN) per tablet, Take 1 tablet by mouth once daily., Disp: , Rfl:     nortriptyline (PAMELOR) 10 MG capsule, 2-3 capsules po qhs, Disp: 90 capsule, Rfl: 6    omega-3 fatty acids/fish oil (FISH OIL-OMEGA-3 FATTY ACIDS) 300-1,000 mg capsule, Take by mouth once daily., Disp: , Rfl:     tiZANidine (ZANAFLEX) 4 MG tablet, Take 1 tablet (4 mg total) by mouth every evening., Disp: 90 tablet, Rfl: 2    triamcinolone acetonide 0.1% (KENALOG) 0.1 % cream, Apply topically daily as needed. , Disp: , Rfl: 3    Current Facility-Administered Medications:      acetaminophen tablet 650 mg, 650 mg, Oral, Once PRN, Karthik Ford MD    diphenhydrAMINE injection 25 mg, 25 mg, Intravenous, Once PRN, Karthik Ford MD    EPINEPHrine (EPIPEN) 0.3 mg/0.3 mL pen injection 0.3 mg, 0.3 mg, Intramuscular, PRN, Karthik Ford MD    methylPREDNISolone sodium succinate injection 40 mg, 40 mg, Intravenous, Once PRN, Karthik Ford MD    ondansetron disintegrating tablet 4 mg, 4 mg, Oral, Once PRN, Karthik Ford MD    sodium chloride 0.9% 500 mL flush bag, , Intravenous, PRN, Karthik Ford MD, Stopped at 01/19/22 1504    sodium chloride 0.9% flush 10 mL, 10 mL, Intravenous, PRN, Karthik Ford MD      PHYSICAL EXAMINATION  There were no vitals filed for this visit.  General: The patient is a well-developed, well-nourished looking of stated age in no acute distress.  NEUROLOGIC EXAM:  MENTAL: The patient is awake, alert and oriented times to time, place, location and situation. Intact recent memory, attention, concentration. Language and speech are normal. No aphasia, no dysarthria  CRANIAL NERVES:   EOMI, no facial asymmetry ,  MOTOR EXAM: 5/5 throughout in UE/LEs  CEREBELLAR EXAM: no observable dysmetria on UEs  DGAIT/STANCE: Standard gait was normal with normal stride, arm swing and turning. No gait ataxia noted     Impression:  Chronic tension type headaches with superimposed infrequent migraines with aura (life long) - despite symptoms have been improving significantly with tizanidine she kept a low grade 1/10 daily headache, but the mild to moderate intensity is about 2 days/week that she requires APAP for that resolves attacks. Given she continues to keep a daily headache and escalations up to moderate pain 2 times a week or so she was transitioned to nortriptyline. She had increase at 20mg nightly nortriptyline during time she was having URI. She did increase to 30mg nightly nortriptyline without much improvement in headache, but more dry eyes/mouth. At this point we will decrease back  to 20mg nightly of nortriptyline. For acute management Tylenol a few times a month has resolved headache.     Cervical spondylosis/cervical myofascial pain -  Improved     Migraine with aura- well controlled lately , denies any recurrence.     Comorbidities:  - History of breast cancer   - chronic microvascular disease in brain    Plan:   1- For preventive management:    Decrease notriptyline to 20mg nightly   Continue  magnesium to 400 mg to 500 mg daily     2- Acute management:               For breakthrough: tylenol as needed for headache treatment, no more than 14 days a month.     3- Keep headache diary      RTC in  3-6 months . Care will be transferred to my colleagues in headache clinic upon my departure from [facility]. Patient expressed understanding.       Radha Tejeda MD   Board Certified Neurologist   [facility] Certified Headache Medicine

## 2022-09-20 NOTE — PATIENT INSTRUCTIONS
1- For preventive management:    Decrease notriptyline to 20mg nightly   Continue  magnesium to 400 mg to 500 mg daily     2- Acute management:               For breakthrough: tylenol as needed for headache treatment, no more than 14 days a month.

## 2022-10-03 ENCOUNTER — LAB VISIT (OUTPATIENT)
Dept: LAB | Facility: HOSPITAL | Age: 77
End: 2022-10-03
Attending: INTERNAL MEDICINE
Payer: MEDICARE

## 2022-10-03 DIAGNOSIS — C50.411 MALIGNANT NEOPLASM OF UPPER-OUTER QUADRANT OF RIGHT FEMALE BREAST: Primary | ICD-10-CM

## 2022-10-03 LAB
ALBUMIN SERPL BCP-MCNC: 4.4 G/DL (ref 3.5–5.2)
ALP SERPL-CCNC: 88 U/L (ref 55–135)
ALT SERPL W/O P-5'-P-CCNC: 19 U/L (ref 10–44)
ANION GAP SERPL CALC-SCNC: 9 MMOL/L (ref 8–16)
AST SERPL-CCNC: 20 U/L (ref 10–40)
BASOPHILS # BLD AUTO: 0.04 K/UL (ref 0–0.2)
BASOPHILS NFR BLD: 0.5 % (ref 0–1.9)
BILIRUB SERPL-MCNC: 0.3 MG/DL (ref 0.1–1)
BUN SERPL-MCNC: 9 MG/DL (ref 8–23)
CALCIUM SERPL-MCNC: 9.5 MG/DL (ref 8.7–10.5)
CEA SERPL-MCNC: 1 NG/ML (ref 0–5)
CHLORIDE SERPL-SCNC: 101 MMOL/L (ref 95–110)
CO2 SERPL-SCNC: 25 MMOL/L (ref 23–29)
CREAT SERPL-MCNC: 0.5 MG/DL (ref 0.5–1.4)
DIFFERENTIAL METHOD: ABNORMAL
EOSINOPHIL # BLD AUTO: 0.1 K/UL (ref 0–0.5)
EOSINOPHIL NFR BLD: 1.2 % (ref 0–8)
ERYTHROCYTE [DISTWIDTH] IN BLOOD BY AUTOMATED COUNT: 12.2 % (ref 11.5–14.5)
EST. GFR  (NO RACE VARIABLE): >60 ML/MIN/1.73 M^2
GLUCOSE SERPL-MCNC: 99 MG/DL (ref 70–110)
HCT VFR BLD AUTO: 42.4 % (ref 37–48.5)
HGB BLD-MCNC: 14.3 G/DL (ref 12–16)
IMM GRANULOCYTES # BLD AUTO: 0.03 K/UL (ref 0–0.04)
IMM GRANULOCYTES NFR BLD AUTO: 0.4 % (ref 0–0.5)
LYMPHOCYTES # BLD AUTO: 2.4 K/UL (ref 1–4.8)
LYMPHOCYTES NFR BLD: 31.7 % (ref 18–48)
MCH RBC QN AUTO: 31.5 PG (ref 27–31)
MCHC RBC AUTO-ENTMCNC: 33.7 G/DL (ref 32–36)
MCV RBC AUTO: 93 FL (ref 82–98)
MONOCYTES # BLD AUTO: 0.6 K/UL (ref 0.3–1)
MONOCYTES NFR BLD: 7.9 % (ref 4–15)
NEUTROPHILS # BLD AUTO: 4.4 K/UL (ref 1.8–7.7)
NEUTROPHILS NFR BLD: 58.3 % (ref 38–73)
NRBC BLD-RTO: 0 /100 WBC
PLATELET # BLD AUTO: 328 K/UL (ref 150–450)
PMV BLD AUTO: 8.9 FL (ref 9.2–12.9)
POTASSIUM SERPL-SCNC: 4 MMOL/L (ref 3.5–5.1)
PROT SERPL-MCNC: 7.8 G/DL (ref 6–8.4)
RBC # BLD AUTO: 4.54 M/UL (ref 4–5.4)
SODIUM SERPL-SCNC: 135 MMOL/L (ref 136–145)
WBC # BLD AUTO: 7.51 K/UL (ref 3.9–12.7)

## 2022-10-03 PROCEDURE — 80053 COMPREHEN METABOLIC PANEL: CPT | Performed by: INTERNAL MEDICINE

## 2022-10-03 PROCEDURE — 36415 COLL VENOUS BLD VENIPUNCTURE: CPT | Performed by: INTERNAL MEDICINE

## 2022-10-03 PROCEDURE — 85025 COMPLETE CBC W/AUTO DIFF WBC: CPT | Performed by: INTERNAL MEDICINE

## 2022-10-03 PROCEDURE — 82378 CARCINOEMBRYONIC ANTIGEN: CPT | Performed by: INTERNAL MEDICINE

## 2022-10-03 PROCEDURE — 86300 IMMUNOASSAY TUMOR CA 15-3: CPT | Mod: 91 | Performed by: INTERNAL MEDICINE

## 2022-10-03 PROCEDURE — 86300 IMMUNOASSAY TUMOR CA 15-3: CPT | Performed by: INTERNAL MEDICINE

## 2022-10-05 LAB — CANCER AG15-3 SERPL-ACNC: 23.1 U/ML (ref 0–25)

## 2022-10-06 LAB — CANCER AG27-29 SERPL-ACNC: 28.6 U/ML (ref 0–38.6)

## 2022-12-01 ENCOUNTER — LAB VISIT (OUTPATIENT)
Dept: LAB | Facility: HOSPITAL | Age: 77
End: 2022-12-01
Attending: FAMILY MEDICINE
Payer: MEDICARE

## 2022-12-01 DIAGNOSIS — E78.2 MIXED DYSLIPIDEMIA: ICD-10-CM

## 2022-12-01 LAB
ANION GAP SERPL CALC-SCNC: 8 MMOL/L (ref 8–16)
BASOPHILS # BLD AUTO: 0.03 K/UL (ref 0–0.2)
BASOPHILS NFR BLD: 0.6 % (ref 0–1.9)
BUN SERPL-MCNC: 7 MG/DL (ref 8–23)
CALCIUM SERPL-MCNC: 9.5 MG/DL (ref 8.7–10.5)
CHLORIDE SERPL-SCNC: 105 MMOL/L (ref 95–110)
CHOLEST SERPL-MCNC: 179 MG/DL (ref 120–199)
CHOLEST/HDLC SERPL: 3.5 {RATIO} (ref 2–5)
CO2 SERPL-SCNC: 26 MMOL/L (ref 23–29)
CREAT SERPL-MCNC: 0.6 MG/DL (ref 0.5–1.4)
DIFFERENTIAL METHOD: ABNORMAL
EOSINOPHIL # BLD AUTO: 0.1 K/UL (ref 0–0.5)
EOSINOPHIL NFR BLD: 1.8 % (ref 0–8)
ERYTHROCYTE [DISTWIDTH] IN BLOOD BY AUTOMATED COUNT: 12.2 % (ref 11.5–14.5)
EST. GFR  (NO RACE VARIABLE): >60 ML/MIN/1.73 M^2
GLUCOSE SERPL-MCNC: 98 MG/DL (ref 70–110)
HCT VFR BLD AUTO: 42 % (ref 37–48.5)
HDLC SERPL-MCNC: 51 MG/DL (ref 40–75)
HDLC SERPL: 28.5 % (ref 20–50)
HGB BLD-MCNC: 13.4 G/DL (ref 12–16)
IMM GRANULOCYTES # BLD AUTO: 0.01 K/UL (ref 0–0.04)
IMM GRANULOCYTES NFR BLD AUTO: 0.2 % (ref 0–0.5)
LDLC SERPL CALC-MCNC: 95 MG/DL (ref 63–159)
LYMPHOCYTES # BLD AUTO: 1.7 K/UL (ref 1–4.8)
LYMPHOCYTES NFR BLD: 33.5 % (ref 18–48)
MCH RBC QN AUTO: 30.5 PG (ref 27–31)
MCHC RBC AUTO-ENTMCNC: 31.9 G/DL (ref 32–36)
MCV RBC AUTO: 96 FL (ref 82–98)
MONOCYTES # BLD AUTO: 0.4 K/UL (ref 0.3–1)
MONOCYTES NFR BLD: 8.7 % (ref 4–15)
NEUTROPHILS # BLD AUTO: 2.7 K/UL (ref 1.8–7.7)
NEUTROPHILS NFR BLD: 55.2 % (ref 38–73)
NONHDLC SERPL-MCNC: 128 MG/DL
NRBC BLD-RTO: 0 /100 WBC
PLATELET # BLD AUTO: 326 K/UL (ref 150–450)
PMV BLD AUTO: 9.9 FL (ref 9.2–12.9)
POTASSIUM SERPL-SCNC: 4.2 MMOL/L (ref 3.5–5.1)
RBC # BLD AUTO: 4.4 M/UL (ref 4–5.4)
SODIUM SERPL-SCNC: 139 MMOL/L (ref 136–145)
TRIGL SERPL-MCNC: 165 MG/DL (ref 30–150)
WBC # BLD AUTO: 4.95 K/UL (ref 3.9–12.7)

## 2022-12-01 PROCEDURE — 80048 BASIC METABOLIC PNL TOTAL CA: CPT | Performed by: FAMILY MEDICINE

## 2022-12-01 PROCEDURE — 36415 COLL VENOUS BLD VENIPUNCTURE: CPT | Mod: PO | Performed by: FAMILY MEDICINE

## 2022-12-01 PROCEDURE — 80061 LIPID PANEL: CPT | Performed by: FAMILY MEDICINE

## 2022-12-01 PROCEDURE — 85025 COMPLETE CBC W/AUTO DIFF WBC: CPT | Performed by: FAMILY MEDICINE

## 2022-12-05 ENCOUNTER — PATIENT MESSAGE (OUTPATIENT)
Dept: NEUROLOGY | Facility: CLINIC | Age: 77
End: 2022-12-05
Payer: MEDICARE

## 2022-12-06 DIAGNOSIS — G44.221 CHRONIC TENSION-TYPE HEADACHE, INTRACTABLE: Primary | ICD-10-CM

## 2022-12-06 RX ORDER — NORTRIPTYLINE HYDROCHLORIDE 10 MG/1
CAPSULE ORAL
Qty: 42 CAPSULE | Refills: 0 | Status: SHIPPED | OUTPATIENT
Start: 2022-12-06 | End: 2022-12-20 | Stop reason: SDUPTHER

## 2022-12-06 NOTE — TELEPHONE ENCOUNTER
----- Message from Jaquelin Thomas sent at 12/6/2022 10:13 AM CST -----  Regarding: refill  Contact: patient  Type:  RX Refill Request    Who Called:  patient  Refill or New Rx:  new  RX Name and Strength:  nortriptyline (PAMELOR) 10 MG capsule  How is the patient currently taking it? (ex. 1XDay):  2xday  Is this a 30 day or 90 day RX:  2 week supply  Preferred Pharmacy with phone number:    [id number] - [place] - [address] AT [facility] [address]  [address] [place] [address]  Phone: [phone] Fax: [phone]  Local or Mail Order:  local  Ordering Provider:  Deborah Suggs  Best Call Back Number:  [phone] (home)   Additional Information:  Patient has the prescription coming form [facility] but is out of medication. She needs a 2 week supply until the mail order comes in. Patient is a former patient of Dr Gage.  Please call patient to advise.Thanks!

## 2022-12-06 NOTE — TELEPHONE ENCOUNTER
----- Message from Jaquelin Thomas sent at 12/6/2022 10:13 AM CST -----  Regarding: refill  Contact: patient  Type:  RX Refill Request    Who Called:  patient  Refill or New Rx:  new  RX Name and Strength:  nortriptyline (PAMELOR) 10 MG capsule  How is the patient currently taking it? (ex. 1XDay):  2xday  Is this a 30 day or 90 day RX:  2 week supply  Preferred Pharmacy with phone number:    [facility][id number] - [place] - [address] [facility] [address]  [address] [place] [address]  Phone: [phone] Fax: [phone]  Local or Mail Order:  local  Ordering Provider:  Deborah Suggs  Best Call Back Number:  [phone] (home)   Additional Information:  Patient has the prescription coming form [facility] but is out of medication. She needs a 2 week supply until the mail order comes in. Patient is a former patient of Dr Gage.  Please call patient to advise.Thanks!

## 2022-12-09 ENCOUNTER — OFFICE VISIT (OUTPATIENT)
Dept: FAMILY MEDICINE | Facility: CLINIC | Age: 77
End: 2022-12-09
Payer: MEDICARE

## 2022-12-09 VITALS
WEIGHT: 137.56 LBS | OXYGEN SATURATION: 97 % | DIASTOLIC BLOOD PRESSURE: 62 MMHG | SYSTOLIC BLOOD PRESSURE: 118 MMHG | RESPIRATION RATE: 14 BRPM | BODY MASS INDEX: 23.49 KG/M2 | HEIGHT: 64 IN | TEMPERATURE: 98 F | HEART RATE: 73 BPM

## 2022-12-09 DIAGNOSIS — R04.0 EPISTAXIS: ICD-10-CM

## 2022-12-09 DIAGNOSIS — Z23 NEED FOR IMMUNIZATION AGAINST INFLUENZA: ICD-10-CM

## 2022-12-09 DIAGNOSIS — Z00.00 ANNUAL PHYSICAL EXAM: Primary | ICD-10-CM

## 2022-12-09 DIAGNOSIS — Z12.11 COLON CANCER SCREENING: ICD-10-CM

## 2022-12-09 DIAGNOSIS — Z00.00 ROUTINE HISTORY AND PHYSICAL EXAMINATION OF ADULT: ICD-10-CM

## 2022-12-09 PROCEDURE — 3288F PR FALLS RISK ASSESSMENT DOCUMENTED: ICD-10-PCS | Mod: CPTII,S$GLB,, | Performed by: FAMILY MEDICINE

## 2022-12-09 PROCEDURE — 1101F PR PT FALLS ASSESS DOC 0-1 FALLS W/OUT INJ PAST YR: ICD-10-PCS | Mod: CPTII,S$GLB,, | Performed by: FAMILY MEDICINE

## 2022-12-09 PROCEDURE — 99397 PR PREVENTIVE VISIT,EST,65 & OVER: ICD-10-PCS | Mod: S$GLB,,, | Performed by: FAMILY MEDICINE

## 2022-12-09 PROCEDURE — 3288F FALL RISK ASSESSMENT DOCD: CPT | Mod: CPTII,S$GLB,, | Performed by: FAMILY MEDICINE

## 2022-12-09 PROCEDURE — 99999 PR PBB SHADOW E&M-EST. PATIENT-LVL V: CPT | Mod: PBBFAC,,, | Performed by: FAMILY MEDICINE

## 2022-12-09 PROCEDURE — 1126F AMNT PAIN NOTED NONE PRSNT: CPT | Mod: CPTII,S$GLB,, | Performed by: FAMILY MEDICINE

## 2022-12-09 PROCEDURE — 99999 PR PBB SHADOW E&M-EST. PATIENT-LVL V: ICD-10-PCS | Mod: PBBFAC,,, | Performed by: FAMILY MEDICINE

## 2022-12-09 PROCEDURE — 3074F SYST BP LT 130 MM HG: CPT | Mod: CPTII,S$GLB,, | Performed by: FAMILY MEDICINE

## 2022-12-09 PROCEDURE — 3074F PR MOST RECENT SYSTOLIC BLOOD PRESSURE < 130 MM HG: ICD-10-PCS | Mod: CPTII,S$GLB,, | Performed by: FAMILY MEDICINE

## 2022-12-09 PROCEDURE — 1126F PR PAIN SEVERITY QUANTIFIED, NO PAIN PRESENT: ICD-10-PCS | Mod: CPTII,S$GLB,, | Performed by: FAMILY MEDICINE

## 2022-12-09 PROCEDURE — 1101F PT FALLS ASSESS-DOCD LE1/YR: CPT | Mod: CPTII,S$GLB,, | Performed by: FAMILY MEDICINE

## 2022-12-09 PROCEDURE — 99397 PER PM REEVAL EST PAT 65+ YR: CPT | Mod: S$GLB,,, | Performed by: FAMILY MEDICINE

## 2022-12-09 PROCEDURE — 3078F PR MOST RECENT DIASTOLIC BLOOD PRESSURE < 80 MM HG: ICD-10-PCS | Mod: CPTII,S$GLB,, | Performed by: FAMILY MEDICINE

## 2022-12-09 PROCEDURE — 3078F DIAST BP <80 MM HG: CPT | Mod: CPTII,S$GLB,, | Performed by: FAMILY MEDICINE

## 2022-12-09 RX ORDER — OXYMETAZOLINE HCL 0.05 %
SPRAY, NON-AEROSOL (ML) NASAL
Qty: 30 ML | Refills: 2 | Status: SHIPPED | OUTPATIENT
Start: 2022-12-09

## 2022-12-09 NOTE — PROGRESS NOTES
Subjective:       Patient ID: Liliana Hairston is a 77 y.o. female.    Chief Complaint: Annual Exam    .      Acute Epistaxis: Patient presents with a nosebleed from right nostril.  It is associated with nasal steroids, dry air.   No active bleeding for over 3 days.        Review of Systems   Constitutional:  Negative for fatigue and unexpected weight change.   HENT:  Positive for nosebleeds.    Respiratory:  Negative for chest tightness and shortness of breath.    Cardiovascular:  Negative for chest pain.   Gastrointestinal:  Negative for abdominal pain.   Genitourinary:  Negative for menstrual problem.   Allergic/Immunologic: Negative for food allergies.   Psychiatric/Behavioral:  Negative for dysphoric mood.      Patient Active Problem List   Diagnosis    Cancer    Primary osteoarthritis of right knee    Bilateral sensorineural hearing loss    Tinnitus of both ears    Diverticulosis of large intestine without hemorrhage    History of breast cancer    Acute midline low back pain without sciatica    Other hyperlipidemia    Intractable episodic cluster headache    Cough    Decreased range of motion of intervertebral discs of cervical spine    Tenderness    Decreased strength    Long term (current) use of aromatase inhibitors    Malignant neoplasm of upper-outer quadrant of right breast in female, estrogen receptor positive       Objective:      Physical Exam  Vitals and nursing note reviewed.   Constitutional:       Appearance: She is well-developed.   Neck:      Thyroid: No thyromegaly.   Cardiovascular:      Rate and Rhythm: Normal rate and regular rhythm.      Heart sounds: Normal heart sounds.   Pulmonary:      Effort: Pulmonary effort is normal.      Breath sounds: Normal breath sounds.   Chest:   Breasts:     Breasts are symmetrical.      Right: No inverted nipple, mass, nipple discharge, skin change or tenderness.      Left: No inverted nipple, mass, nipple discharge, skin change or tenderness.   Abdominal:       General: Bowel sounds are normal. There is no distension.      Palpations: Abdomen is soft.      Tenderness: There is no abdominal tenderness.   Genitourinary:     Exam position: Supine.      Labia:         Right: No rash, tenderness, lesion or injury.         Left: No rash, tenderness, lesion or injury.       Vagina: Normal. No vaginal discharge.      Cervix: No cervical motion tenderness, discharge or friability.      Adnexa:         Right: No mass, tenderness or fullness.          Left: No mass, tenderness or fullness.     Skin:     General: Skin is warm and dry.   Neurological:      Mental Status: She is alert and oriented to person, place, and time.       Lab Results   Component Value Date    WBC 4.95 12/01/2022    HGB 13.4 12/01/2022    HCT 42.0 12/01/2022     12/01/2022    CHOL 179 12/01/2022    TRIG 165 (H) 12/01/2022    HDL 51 12/01/2022    ALT 19 10/03/2022    AST 20 10/03/2022     12/01/2022    K 4.2 12/01/2022     12/01/2022    CREATININE 0.6 12/01/2022    BUN 7 (L) 12/01/2022    CO2 26 12/01/2022    TSH 0.946 11/16/2010     The 10-year ASCVD risk score (Carl FRAZIER, et al., 2019) is: 15.2%    Values used to calculate the score:      Age: 77 years      Sex: Female      Is Non- : No      Diabetic: No      Tobacco smoker: No      Systolic Blood Pressure: 118 mmHg      Is BP treated: No      HDL Cholesterol: 51 mg/dL      Total Cholesterol: 179 mg/dL    Assessment:       1. Annual physical exam    2. Epistaxis    3. Colon cancer screening          Plan:       Annual physical exam    Epistaxis  -     oxymetazoline (AFRIN, OXYMETAZOLINE,) 0.05 % nasal spray; 2 spray tid x 3 days for severe congestion or bleeding  Dispense: 30 mL; Refill: 2  Stable and controlled. Continue current treatment plan as previously prescribed with your PCP.     Colon cancer screening  -     Fecal Immunochemical Test (iFOBT); Future; Expected date: 12/09/2022      Patient readiness:  acceptance and barriers:none    During the course of the visit the patient was educated and counseled about the following:     Activity, immunization and Social interavtions encourage.    Goals Achived    Did patient meet goals/outcomes: Yes    The following self management tools provided: excercise log    Patient Instructions (the written plan) was given to the patient/family.     Time spent with patient: 30 minutes    Barriers to medications present (no )    Adverse reactions to current medications (no)    Over the counter medications reviewed (Yes)        30-minute visit. 10 minutes spent counseling patient on diet, exercise, and weight loss.  This has been fully explained to the patient, who indicates understanding.      Discussed health maintenance guidelines appropriate for age.

## 2022-12-20 ENCOUNTER — OFFICE VISIT (OUTPATIENT)
Dept: NEUROLOGY | Facility: CLINIC | Age: 77
End: 2022-12-20
Payer: MEDICARE

## 2022-12-20 VITALS
SYSTOLIC BLOOD PRESSURE: 138 MMHG | HEART RATE: 70 BPM | HEIGHT: 64 IN | WEIGHT: 139.44 LBS | DIASTOLIC BLOOD PRESSURE: 72 MMHG | BODY MASS INDEX: 23.81 KG/M2

## 2022-12-20 DIAGNOSIS — G44.221 CHRONIC TENSION-TYPE HEADACHE, INTRACTABLE: ICD-10-CM

## 2022-12-20 DIAGNOSIS — R51.9 CHRONIC DAILY HEADACHE: ICD-10-CM

## 2022-12-20 DIAGNOSIS — G44.221 CHRONIC TENSION-TYPE HEADACHE, INTRACTABLE: Primary | ICD-10-CM

## 2022-12-20 PROCEDURE — 3288F PR FALLS RISK ASSESSMENT DOCUMENTED: ICD-10-PCS | Mod: CPTII,S$GLB,, | Performed by: NURSE PRACTITIONER

## 2022-12-20 PROCEDURE — 99213 OFFICE O/P EST LOW 20 MIN: CPT | Mod: S$GLB,,, | Performed by: NURSE PRACTITIONER

## 2022-12-20 PROCEDURE — 3075F PR MOST RECENT SYSTOLIC BLOOD PRESS GE 130-139MM HG: ICD-10-PCS | Mod: CPTII,S$GLB,, | Performed by: NURSE PRACTITIONER

## 2022-12-20 PROCEDURE — 1101F PR PT FALLS ASSESS DOC 0-1 FALLS W/OUT INJ PAST YR: ICD-10-PCS | Mod: CPTII,S$GLB,, | Performed by: NURSE PRACTITIONER

## 2022-12-20 PROCEDURE — 3075F SYST BP GE 130 - 139MM HG: CPT | Mod: CPTII,S$GLB,, | Performed by: NURSE PRACTITIONER

## 2022-12-20 PROCEDURE — 1160F RVW MEDS BY RX/DR IN RCRD: CPT | Mod: CPTII,S$GLB,, | Performed by: NURSE PRACTITIONER

## 2022-12-20 PROCEDURE — 99999 PR PBB SHADOW E&M-EST. PATIENT-LVL IV: CPT | Mod: PBBFAC,,, | Performed by: NURSE PRACTITIONER

## 2022-12-20 PROCEDURE — 3078F DIAST BP <80 MM HG: CPT | Mod: CPTII,S$GLB,, | Performed by: NURSE PRACTITIONER

## 2022-12-20 PROCEDURE — 99999 PR PBB SHADOW E&M-EST. PATIENT-LVL IV: ICD-10-PCS | Mod: PBBFAC,,, | Performed by: NURSE PRACTITIONER

## 2022-12-20 PROCEDURE — 1160F PR REVIEW ALL MEDS BY PRESCRIBER/CLIN PHARMACIST DOCUMENTED: ICD-10-PCS | Mod: CPTII,S$GLB,, | Performed by: NURSE PRACTITIONER

## 2022-12-20 PROCEDURE — 1159F MED LIST DOCD IN RCRD: CPT | Mod: CPTII,S$GLB,, | Performed by: NURSE PRACTITIONER

## 2022-12-20 PROCEDURE — 99213 PR OFFICE/OUTPT VISIT, EST, LEVL III, 20-29 MIN: ICD-10-PCS | Mod: S$GLB,,, | Performed by: NURSE PRACTITIONER

## 2022-12-20 PROCEDURE — 3288F FALL RISK ASSESSMENT DOCD: CPT | Mod: CPTII,S$GLB,, | Performed by: NURSE PRACTITIONER

## 2022-12-20 PROCEDURE — 1159F PR MEDICATION LIST DOCUMENTED IN MEDICAL RECORD: ICD-10-PCS | Mod: CPTII,S$GLB,, | Performed by: NURSE PRACTITIONER

## 2022-12-20 PROCEDURE — 3078F PR MOST RECENT DIASTOLIC BLOOD PRESSURE < 80 MM HG: ICD-10-PCS | Mod: CPTII,S$GLB,, | Performed by: NURSE PRACTITIONER

## 2022-12-20 PROCEDURE — 1101F PT FALLS ASSESS-DOCD LE1/YR: CPT | Mod: CPTII,S$GLB,, | Performed by: NURSE PRACTITIONER

## 2022-12-20 RX ORDER — NORTRIPTYLINE HYDROCHLORIDE 10 MG/1
20 CAPSULE ORAL NIGHTLY
Qty: 180 CAPSULE | Refills: 3 | Status: SHIPPED | OUTPATIENT
Start: 2022-12-20 | End: 2023-12-26

## 2022-12-20 NOTE — PROGRESS NOTES
Date of service: 12/20/2022  Referring provider: No ref. provider found    Subjective:      Chief complaint: Headache       Patient ID: Liliana Hairston is a 77 y.o. female with bilateral hearing loss, personal history of breast cancer who presents for follow up of headache    She was previously followed by Dr. Gage and is transferring care to me.     History of Present Illness    INTERVAL HISTORY 12/20/22    Last visit was three months ago with Dr. Gage and at that time she was doing better. Plan was to decrease nortriptyline due to side effects.    Today she reports she is a little better. She reports a few headache days per month. Typically headaches are weather related. No longer having severe headaches at night. Current head pain 0 with range 0-3. She takes tylenol which has been effective.     ORIGINAL HEADACHE HISTORY - from 1/2021 with Dr. Gage  Age at onset and course over time: whole life (more frequent x2 year)  Location: left temple  Character: dull/pressure, and less throbbing  Worse during evenings   Intensity: 1-3 /10 on average and occasionally 9/10 (<1 time per month)  Frequency: daily  Duration: constant  Aura: [place] with only intense headaches (since young age, improved over the years, still having it every few months)  Associated symptoms: None, denies any light/sound/smell sensitivity, denies any physical activity aggravating headache or avoiding physical activity due to worsening headache  Precipitating factors: not unknown  Alleviating factors: medications  Aggravating factors: sneezing, sleep disturbance  Family history of headache: No  ER visits: 0   Sleep: no history of ADRIANO. Wakes up twice for bathroom. 6-7 hours of sleep/night    Current acute treatment:  Tizanidine    Current prevention:  Nortriptyline - 20 mg QHS    Previously tried/failed acute treatment:  None    Previously tried/failed preventative treatment:  None     Review of patient's allergies indicates:  No Known  Allergies  Current Outpatient Medications   Medication Sig Dispense Refill    anastrozole (ARIMIDEX) 1 mg Tab Take 1 tablet (1 mg total) by mouth once daily. 90 tablet 3    azelastine (ASTELIN) 137 mcg (0.1 %) nasal spray 1 spray (137 mcg total) by Nasal route 2 (two) times daily as needed for Rhinitis. 30 mL 4    CALCIUM CARBONATE/VITAMIN D3 (CALCIUM 500 + D ORAL) Take 1 each by mouth daily as needed.       dicyclomine (BENTYL) 20 mg tablet Take 1 tablet (20 mg total) by mouth 3 (three) times daily as needed (stomache). 90 tablet 1    magnesium 30 mg Tab Take 400 mg by mouth once.      meloxicam (MOBIC) 15 MG tablet Take 1 tablet (15 mg total) by mouth daily as needed for Pain. 90 tablet 3    METAMUCIL, SUGAR, Powd Take 1 Package by mouth once daily.  0    montelukast (SINGULAIR) 10 mg tablet Take 1 tablet (10 mg total) by mouth every evening. 90 tablet 3    multivitamin (THERAGRAN) per tablet Take 1 tablet by mouth once daily.      omega-3 fatty acids/fish oil (FISH OIL-OMEGA-3 FATTY ACIDS) 300-1,000 mg capsule Take by mouth once daily.      oxymetazoline (AFRIN, OXYMETAZOLINE,) 0.05 % nasal spray 2 spray tid x 3 days for severe congestion or bleeding 30 mL 2    tiZANidine (ZANAFLEX) 4 MG tablet Take 1 tablet (4 mg total) by mouth every evening. 90 tablet 2    triamcinolone acetonide 0.1% (KENALOG) 0.1 % cream Apply topically daily as needed.   3    nortriptyline (PAMELOR) 10 MG capsule Take 2 capsules (20 mg total) by mouth every evening. 180 capsule 3     Current Facility-Administered Medications   Medication Dose Route Frequency Provider Last Rate Last Admin    acetaminophen tablet 650 mg  650 mg Oral Once PRN Karthik Ford MD        diphenhydrAMINE injection 25 mg  25 mg Intravenous Once PRN Karthik Ford MD        EPINEPHrine (EPIPEN) 0.3 mg/0.3 mL pen injection 0.3 mg  0.3 mg Intramuscular PRN Karthik Ford MD        methylPREDNISolone sodium succinate injection 40 mg  40 mg Intravenous Once PRFAUSTO Angeles  MD Logan        ondansetron disintegrating tablet 4 mg  4 mg Oral Once PRN Karthik Ford MD        sodium chloride 0.9% 500 mL flush bag   Intravenous PRN Karthik Ford MD   Stopped at 01/19/22 1504    sodium chloride 0.9% flush 10 mL  10 mL Intravenous PRN Karthik Ford MD           Past Medical History  Past Medical History:   Diagnosis Date    Breast cancer     Cancer     breast, right '97    Cataract     Headache        Past Surgical History  Past Surgical History:   Procedure Laterality Date    APPENDECTOMY      BREAST SURGERY  1997    right mastectomy    COLONOSCOPY N/A 2/21/2018    Procedure: COLONOSCOPY;  Surgeon: Cristel Fritz MD;  Location: [facility];  Service: Endoscopy;  Laterality: N/A;    EYE SURGERY      HYSTERECTOMY      LUNG SURGERY  1998    remove cyst on top of right lobe    right mastectomy  2015       Family History  Family History   Problem Relation Age of Onset    Cancer Mother     Colon cancer Mother     No Known Problems Father     Breast cancer Maternal Aunt     Cancer Maternal Aunt     ALS Sister     No Known Problems Brother     Diverticulitis Son     Heart disease Maternal Uncle     Alzheimer's disease Maternal Grandmother     Ovarian cancer Neg Hx        Social History  Social History     Socioeconomic History    Marital status:    Tobacco Use    Smoking status: Never    Smokeless tobacco: Never   Substance and Sexual Activity    Alcohol use: Yes     Alcohol/week: 4.0 standard drinks     Types: 2 Glasses of wine, 2 Cans of beer per week     Comment: 2x per week wine or beer    Drug use: No        Review of Systems  14-point review of systems as follows:   No check kirill indicates NEGATIVE response   Constitutional: [] weight loss, [] change to appetite   Eyes: [] change in vision, [] double vision   Ears, nose, mouth, throat: [] frequent nose bleeds, [] ringing in the ears   Respiratory: [] cough, [] wheezing   Cardiovascular: [] chest pain, [] palpitations    Gastrointestinal: [] jaundice, [] nausea/vomiting   Genitourinary: [] incontinence, [] burning with urination   Hematologic/lymphatic: [] easy bruising/bleeding, [] night sweats   Neurological: [] numbness, [] weakness   Endocrine: [] fatigue, [] heat/cold intolerance   Allergy/Immunologic: [] fevers, [] chills   Musculoskeletal: [] muscle pain, [] joint pain   Psychiatric: [] thoughts of harming self/others, [] depression   Integumentary: [] rashes, [] sores that do not heal     Objective:        Vitals:    12/20/22 0931   BP: 138/72   Pulse: 70     Body mass index is 23.94 kg/m².    12/20/22   Constitutional:   She appears well-developed and well-nourished. She is well groomed     Neurological Exam:  General: well-developed, well-nourished, no distress  Mental status: Awake and alert  Speech language: No dysarthria or aphasia on conversation  Cranial nerves: Face symmetric  Motor: Moves all extremities well  Coordination: No ataxia. No tremor.    Data Review:     I have personally reviewed the referring provider's notes, labs, & imaging made available to me today.      RADIOLOGY STUDIES:  I have personally reviewed the pertinent images performed.       Results for orders placed or performed during the hospital encounter of 01/07/21   MRI Brain W WO Contrast    Narrative    EXAMINATION:  MRI BRAIN W WO CONTRAST    CLINICAL HISTORY:  new onset daily headache, history of breast CA;.  Headache, unspecified    TECHNIQUE:  Multiplanar multisequence MR imaging of the brain was performed before and after the administration of 6 mL Gadavist intravenous contrast.  Diffusion-weighted imaging was performed.  ADC map was generated.    COMPARISON:  None.    FINDINGS:  Intracranial compartment:    There is no acute or significant abnormality.  There is no intracranial hemorrhage.  There is no mass or mass effect.  There are no regions of restricted diffusion to suggest acute infarction.  There is only mild volume loss.  There  is no hydrocephalus or midline shift.  There is a mild burden of periventricular and scattered subcortical white matter FLAIR and T2 hyperintense signal.  These findings are nonspecific but in a patient of this age, likely reflect sequelae of chronic small vessel disease.  There is no abnormal enhancement in the brain.  There is no abnormal extra-axial fluid collection.  The basilar cisterns are open.  Flow voids indicating patency are present in the major vessels at the base of the brain.  The cerebellar tonsils are normal position.  Sellar structures are normal.  The patient has had bilateral lens replacement surgery.  Otherwise, the orbits are normal.    Skull/extracranial contents:    Baseline marrow signal is normal.  The paranasal sinuses and mastoid air cells are clear.      Impression    1. There is no acute or significant abnormality.  There is mild volume loss and nonspecific white matter change.  There is no intracranial hemorrhage, mass, mass effect, acute infarction or abnormal enhancement.      Electronically signed by: Kevin Garzon MD  Date:    01/07/2021  Time:    13:58       Lab Results   Component Value Date     12/01/2022     10/06/2017    K 4.2 12/01/2022     12/01/2022     10/06/2017    CO2 26 12/01/2022    BUN 7 (L) 12/01/2022    CREATININE 0.6 12/01/2022    CREATININE 0.50 (L) 10/06/2017    GLU 98 12/01/2022     (H) 10/06/2017    AST 20 10/03/2022    ALT 19 10/03/2022    ALBUMIN 4.4 10/03/2022    ALBUMIN 4.4 10/06/2017    PROT 7.8 10/03/2022    BILITOT 0.3 10/03/2022    CHOL 179 12/01/2022    HDL 51 12/01/2022    LDLCALC 95.0 12/01/2022    TRIG 165 (H) 12/01/2022       Lab Results   Component Value Date    WBC 4.95 12/01/2022    HGB 13.4 12/01/2022    HCT 42.0 12/01/2022    MCV 96 12/01/2022     12/01/2022       Lab Results   Component Value Date    TSH 0.946 11/16/2010           Assessment & Plan:       Problem List Items Addressed This Visit     None  Visit Diagnoses       Chronic tension-type headache, intractable    -  Primary    Significant improvement on nortriptyline. 20 mg is max dose tolerated.     Chronic daily headache        Resolved                Please call our clinic at [phone] or send a message on the [facility] portal if there are any changes to the plan described below, for example,if you are not contacted for the requested tests, referral(s) within one week, if you are unable to receive the medications prescribed, or if you feel you need to change the treatment course for any reason.     TESTING:  -- none    REFERRALS:  -- none    PREVENTION (use daily regardless of headache):  -- continue magnesium and nortriptyline    AS-NEEDED TREATMENT (use total no more than 10 days per month unless otherwise stated):  -- continue tylenol     Follow up in about 6 months (around 6/20/2023) for follow up with NEL.    Deborah Suggs NP

## 2022-12-20 NOTE — PATIENT INSTRUCTIONS
Please call our clinic at [phone] or send a message on the [facility] portal if there are any changes to the plan described below, for example,if you are not contacted for the requested tests, referral(s) within one week, if you are unable to receive the medications prescribed, or if you feel you need to change the treatment course for any reason.     TESTING:  -- none    REFERRALS:  -- none    PREVENTION (use daily regardless of headache):  -- continue magnesium and nortriptyline    AS-NEEDED TREATMENT (use total no more than 10 days per month unless otherwise stated):  -- continue tylenol

## 2023-01-04 ENCOUNTER — LAB VISIT (OUTPATIENT)
Dept: LAB | Facility: HOSPITAL | Age: 78
End: 2023-01-04
Attending: FAMILY MEDICINE
Payer: MEDICARE

## 2023-01-04 DIAGNOSIS — Z12.11 COLON CANCER SCREENING: ICD-10-CM

## 2023-01-04 PROCEDURE — 82274 ASSAY TEST FOR BLOOD FECAL: CPT | Performed by: FAMILY MEDICINE

## 2023-01-10 LAB — HEMOCCULT STL QL IA: NEGATIVE

## 2023-01-30 DIAGNOSIS — M25.562 ACUTE PAIN OF LEFT KNEE: Primary | ICD-10-CM

## 2023-02-01 ENCOUNTER — HOSPITAL ENCOUNTER (OUTPATIENT)
Dept: RADIOLOGY | Facility: HOSPITAL | Age: 78
Discharge: HOME | End: 2023-02-01
Attending: ORTHOPAEDIC SURGERY
Payer: MEDICARE

## 2023-02-01 ENCOUNTER — OFFICE VISIT (OUTPATIENT)
Dept: ORTHOPEDICS | Facility: CLINIC | Age: 78
End: 2023-02-01
Payer: MEDICARE

## 2023-02-01 VITALS — HEIGHT: 64 IN | WEIGHT: 139 LBS | RESPIRATION RATE: 18 BRPM | BODY MASS INDEX: 23.73 KG/M2

## 2023-02-01 DIAGNOSIS — M17.12 PRIMARY OSTEOARTHRITIS OF LEFT KNEE: Primary | ICD-10-CM

## 2023-02-01 DIAGNOSIS — M25.562 ACUTE PAIN OF LEFT KNEE: ICD-10-CM

## 2023-02-01 DIAGNOSIS — M17.11 PRIMARY OSTEOARTHRITIS OF RIGHT KNEE: ICD-10-CM

## 2023-02-01 PROCEDURE — 1101F PT FALLS ASSESS-DOCD LE1/YR: CPT | Mod: CPTII,S$GLB,, | Performed by: ORTHOPAEDIC SURGERY

## 2023-02-01 PROCEDURE — 1159F PR MEDICATION LIST DOCUMENTED IN MEDICAL RECORD: ICD-10-PCS | Mod: CPTII,S$GLB,, | Performed by: ORTHOPAEDIC SURGERY

## 2023-02-01 PROCEDURE — 1125F PR PAIN SEVERITY QUANTIFIED, PAIN PRESENT: ICD-10-PCS | Mod: CPTII,S$GLB,, | Performed by: ORTHOPAEDIC SURGERY

## 2023-02-01 PROCEDURE — 73560 X-RAY EXAM OF KNEE 1 OR 2: CPT | Mod: 59,TC,PN,RT

## 2023-02-01 PROCEDURE — 99213 OFFICE O/P EST LOW 20 MIN: CPT | Mod: 25,S$GLB,, | Performed by: ORTHOPAEDIC SURGERY

## 2023-02-01 PROCEDURE — 20610 DRAIN/INJ JOINT/BURSA W/O US: CPT | Mod: LT,S$GLB,, | Performed by: ORTHOPAEDIC SURGERY

## 2023-02-01 PROCEDURE — 73560 XR KNEE ORTHO LEFT: ICD-10-PCS | Mod: 26,RT,, | Performed by: RADIOLOGY

## 2023-02-01 PROCEDURE — 1159F MED LIST DOCD IN RCRD: CPT | Mod: CPTII,S$GLB,, | Performed by: ORTHOPAEDIC SURGERY

## 2023-02-01 PROCEDURE — 3288F FALL RISK ASSESSMENT DOCD: CPT | Mod: CPTII,S$GLB,, | Performed by: ORTHOPAEDIC SURGERY

## 2023-02-01 PROCEDURE — 99999 PR PBB SHADOW E&M-EST. PATIENT-LVL III: CPT | Mod: PBBFAC,,, | Performed by: ORTHOPAEDIC SURGERY

## 2023-02-01 PROCEDURE — 1101F PR PT FALLS ASSESS DOC 0-1 FALLS W/OUT INJ PAST YR: ICD-10-PCS | Mod: CPTII,S$GLB,, | Performed by: ORTHOPAEDIC SURGERY

## 2023-02-01 PROCEDURE — 73562 X-RAY EXAM OF KNEE 3: CPT | Mod: 26,LT,, | Performed by: RADIOLOGY

## 2023-02-01 PROCEDURE — 73560 X-RAY EXAM OF KNEE 1 OR 2: CPT | Mod: 26,RT,, | Performed by: RADIOLOGY

## 2023-02-01 PROCEDURE — 73562 XR KNEE ORTHO LEFT: ICD-10-PCS | Mod: 26,LT,, | Performed by: RADIOLOGY

## 2023-02-01 PROCEDURE — 1125F AMNT PAIN NOTED PAIN PRSNT: CPT | Mod: CPTII,S$GLB,, | Performed by: ORTHOPAEDIC SURGERY

## 2023-02-01 PROCEDURE — 99213 PR OFFICE/OUTPT VISIT, EST, LEVL III, 20-29 MIN: ICD-10-PCS | Mod: 25,S$GLB,, | Performed by: ORTHOPAEDIC SURGERY

## 2023-02-01 PROCEDURE — 3288F PR FALLS RISK ASSESSMENT DOCUMENTED: ICD-10-PCS | Mod: CPTII,S$GLB,, | Performed by: ORTHOPAEDIC SURGERY

## 2023-02-01 PROCEDURE — 20610 LARGE JOINT ASPIRATION/INJECTION: L KNEE: ICD-10-PCS | Mod: LT,S$GLB,, | Performed by: ORTHOPAEDIC SURGERY

## 2023-02-01 PROCEDURE — 99999 PR PBB SHADOW E&M-EST. PATIENT-LVL III: ICD-10-PCS | Mod: PBBFAC,,, | Performed by: ORTHOPAEDIC SURGERY

## 2023-02-01 RX ORDER — TRIAMCINOLONE ACETONIDE 40 MG/ML
60 INJECTION, SUSPENSION INTRA-ARTICULAR; INTRAMUSCULAR
Status: DISCONTINUED | OUTPATIENT
Start: 2023-02-01 | End: 2023-02-01 | Stop reason: HOSPADM

## 2023-02-01 RX ADMIN — TRIAMCINOLONE ACETONIDE 60 MG: 40 INJECTION, SUSPENSION INTRA-ARTICULAR; INTRAMUSCULAR at 09:02

## 2023-02-01 NOTE — PROCEDURES
Large Joint Aspiration/Injection: L knee    Date/Time: 2/1/2023 9:45 AM  Performed by: Gera Smith MD  Authorized by: Gera Smith MD     Indications:  Arthritis and pain  Local anesthetic:  Lidocaine 1% without epinephrine    Details:  Needle Size:  18 G  Approach:  Lateral  Location:  Knee  Site:  L knee  Medications:  60 mg triamcinolone acetonide 40 mg/mL

## 2023-02-01 NOTE — PROGRESS NOTES
2/1/2023    Past Medical History:   Diagnosis Date    Breast cancer     Cancer     breast, right '97    Cataract     Headache        Past Surgical History:   Procedure Laterality Date    APPENDECTOMY      BREAST SURGERY  1997    right mastectomy    COLONOSCOPY N/A 2/21/2018    Procedure: COLONOSCOPY;  Surgeon: Cristel Fritz MD;  Location: [facility];  Service: Endoscopy;  Laterality: N/A;    EYE SURGERY      HYSTERECTOMY      LUNG SURGERY  1998    remove cyst on top of right lobe    right mastectomy  2015       Current Outpatient Medications   Medication Sig    anastrozole (ARIMIDEX) 1 mg Tab Take 1 tablet (1 mg total) by mouth once daily.    azelastine (ASTELIN) 137 mcg (0.1 %) nasal spray 1 spray (137 mcg total) by Nasal route 2 (two) times daily as needed for Rhinitis.    CALCIUM CARBONATE/VITAMIN D3 (CALCIUM 500 + D ORAL) Take 1 each by mouth daily as needed.     dicyclomine (BENTYL) 20 mg tablet Take 1 tablet (20 mg total) by mouth 3 (three) times daily as needed (stomache).    magnesium 30 mg Tab Take 400 mg by mouth once.    meloxicam (MOBIC) 15 MG tablet Take 1 tablet (15 mg total) by mouth daily as needed for Pain.    montelukast (SINGULAIR) 10 mg tablet Take 1 tablet (10 mg total) by mouth every evening.    multivitamin (THERAGRAN) per tablet Take 1 tablet by mouth once daily.    nortriptyline (PAMELOR) 10 MG capsule Take 2 capsules (20 mg total) by mouth every evening.    omega-3 fatty acids/fish oil (FISH OIL-OMEGA-3 FATTY ACIDS) 300-1,000 mg capsule Take by mouth once daily.    oxymetazoline (AFRIN, OXYMETAZOLINE,) 0.05 % nasal spray 2 spray tid x 3 days for severe congestion or bleeding    triamcinolone acetonide 0.1% (KENALOG) 0.1 % cream Apply topically daily as needed.      Current Facility-Administered Medications   Medication    acetaminophen tablet 650 mg    diphenhydrAMINE injection 25 mg    methylPREDNISolone sodium succinate injection 40 mg       Review of patient's allergies  indicates:  No Known Allergies    Family History   Problem Relation Age of Onset    Cancer Mother     Colon cancer Mother     No Known Problems Father     Breast cancer Maternal Aunt     Cancer Maternal Aunt     ALS Sister     No Known Problems Brother     Diverticulitis Son     Heart disease Maternal Uncle     Alzheimer's disease Maternal Grandmother     Ovarian cancer Neg Hx        Social History     Socioeconomic History    Marital status:    Tobacco Use    Smoking status: Never    Smokeless tobacco: Never   Substance and Sexual Activity    Alcohol use: Yes     Alcohol/week: 4.0 standard drinks     Types: 2 Glasses of wine, 2 Cans of beer per week     Comment: 2x per week wine or beer    Drug use: No       Chief Complaint:   Chief Complaint   Patient presents with    Left Knee - Pain     PL: 3. Left knee pain. Ongoing pain for a while. No falls. Had a previous injection to R knee 4/19/21, the injection helped that knee a lot. Takes mobic sometimes for relief and also tylenol         History of present illness:    This is a 77 y.o. year old female who complains of patient is seeing me today for left knee pain she had been having chronic left knee pain for a while denies any history of any falls has had a previous injection to her right knee in April 2021 the injection helped her knee a lot she is presently on Mobic with some relief    Review of Systems:    Constitution: Denies chills, fever, and sweats.  HENT: Denies headaches or blurry vision.  Cardiovascular: Denies chest pain or irregular heart beat.  Respiratory: Denies cough or shortness of breath.  Gastrointestinal: Denies abdominal pain, nausea, or vomiting.  Musculoskeletal:  Denies muscle cramps.  Neurological: Denies dizziness or focal weakness.  Psychiatric/Behavioral: Normal mental status.  Hematologic/Lymphatic: Denies bleeding problem or easy bruising/bleeding.  Skin: Denies rash or suspicious lesions.    Examination:    Vital Signs:   "  Vitals:    02/01/23 0949   Resp: 18   Weight: 63 kg (139 lb)   Height: 5' 4" (1.626 m)   PainSc:   3   PainLoc: Knee       Body mass index is 23.86 kg/m².    This a well-developed, well nourished patient in no acute distress.    Alert and oriented x 3 and cooperative to examination.       Physical Exam:  Left knee-patient has no significant effusion in the left knee she has generalized discomfort on range of motion there is no warmth the cellulitis      Right knee-patient has no effusion right knee moves the knee well minimal complaints of any pain    Imaging:  X-ray of the left knee show some early arthritic changes with mild narrowing of the medial and lateral joint line and some early osteophyte formation x-ray of the right knee shows moderate to severe arthritic changes with more medial and lateral joint line narrowing and osteophyte formation       Assessment: Primary osteoarthritis of left knee    Primary osteoarthritis of right knee        Plan:  Patient appears to have early arthritic changes in the left knee she denies any locking or popping she has x-ray evidence of early arthritis also I am going to go ahead and aspirate and inject her left knee with Kenalog today and have her return in about 5 weeks if she shows no improvement we may consider getting an MRI of her left knee the patient has gotten relief in the past with arthroscopy of her right knee.      DISCLAIMER: This note may have been dictated using voice recognition software and may contain grammatical errors.     NOTE: Consult report sent to referring provider via [employer] EMR.    "

## 2023-03-30 ENCOUNTER — LAB VISIT (OUTPATIENT)
Dept: LAB | Facility: HOSPITAL | Age: 78
End: 2023-03-30
Attending: INTERNAL MEDICINE
Payer: MEDICARE

## 2023-03-30 DIAGNOSIS — I82.439: Primary | ICD-10-CM

## 2023-03-30 DIAGNOSIS — C50.412: ICD-10-CM

## 2023-03-30 DIAGNOSIS — Z17.0: ICD-10-CM

## 2023-03-30 DIAGNOSIS — C80.1 CANCER: ICD-10-CM

## 2023-03-30 DIAGNOSIS — C50.411: ICD-10-CM

## 2023-03-30 DIAGNOSIS — Z85.3 HISTORY OF BREAST CANCER: ICD-10-CM

## 2023-03-30 LAB
ALBUMIN SERPL BCP-MCNC: 4.1 G/DL (ref 3.5–5.2)
ALP SERPL-CCNC: 86 U/L (ref 55–135)
ALT SERPL W/O P-5'-P-CCNC: 18 U/L (ref 10–44)
ANION GAP SERPL CALC-SCNC: 10 MMOL/L (ref 8–16)
AST SERPL-CCNC: 17 U/L (ref 10–40)
BASOPHILS # BLD AUTO: 0.03 K/UL (ref 0–0.2)
BASOPHILS NFR BLD: 0.4 % (ref 0–1.9)
BILIRUB SERPL-MCNC: 0.6 MG/DL (ref 0.1–1)
BUN SERPL-MCNC: 13 MG/DL (ref 8–23)
CALCIUM SERPL-MCNC: 9.3 MG/DL (ref 8.7–10.5)
CEA SERPL-MCNC: 1.1 NG/ML (ref 0–5)
CHLORIDE SERPL-SCNC: 100 MMOL/L (ref 95–110)
CO2 SERPL-SCNC: 27 MMOL/L (ref 23–29)
CREAT SERPL-MCNC: 0.6 MG/DL (ref 0.5–1.4)
DIFFERENTIAL METHOD: ABNORMAL
EOSINOPHIL # BLD AUTO: 0.1 K/UL (ref 0–0.5)
EOSINOPHIL NFR BLD: 1.6 % (ref 0–8)
ERYTHROCYTE [DISTWIDTH] IN BLOOD BY AUTOMATED COUNT: 12.5 % (ref 11.5–14.5)
EST. GFR  (NO RACE VARIABLE): >60 ML/MIN/1.73 M^2
GLUCOSE SERPL-MCNC: 90 MG/DL (ref 70–110)
HCT VFR BLD AUTO: 39.5 % (ref 37–48.5)
HGB BLD-MCNC: 13.1 G/DL (ref 12–16)
IMM GRANULOCYTES # BLD AUTO: 0.03 K/UL (ref 0–0.04)
IMM GRANULOCYTES NFR BLD AUTO: 0.4 % (ref 0–0.5)
LYMPHOCYTES # BLD AUTO: 2.2 K/UL (ref 1–4.8)
LYMPHOCYTES NFR BLD: 27.9 % (ref 18–48)
MCH RBC QN AUTO: 31.2 PG (ref 27–31)
MCHC RBC AUTO-ENTMCNC: 33.2 G/DL (ref 32–36)
MCV RBC AUTO: 94 FL (ref 82–98)
MONOCYTES # BLD AUTO: 0.6 K/UL (ref 0.3–1)
MONOCYTES NFR BLD: 7.2 % (ref 4–15)
NEUTROPHILS # BLD AUTO: 5 K/UL (ref 1.8–7.7)
NEUTROPHILS NFR BLD: 62.5 % (ref 38–73)
NRBC BLD-RTO: 0 /100 WBC
PLATELET # BLD AUTO: 334 K/UL (ref 150–450)
PMV BLD AUTO: 9 FL (ref 9.2–12.9)
POTASSIUM SERPL-SCNC: 3.9 MMOL/L (ref 3.5–5.1)
PROT SERPL-MCNC: 7 G/DL (ref 6–8.4)
RBC # BLD AUTO: 4.2 M/UL (ref 4–5.4)
SODIUM SERPL-SCNC: 137 MMOL/L (ref 136–145)
WBC # BLD AUTO: 7.93 K/UL (ref 3.9–12.7)

## 2023-03-30 PROCEDURE — 82378 CARCINOEMBRYONIC ANTIGEN: CPT | Performed by: INTERNAL MEDICINE

## 2023-03-30 PROCEDURE — 86300 IMMUNOASSAY TUMOR CA 15-3: CPT | Performed by: INTERNAL MEDICINE

## 2023-03-30 PROCEDURE — 36415 COLL VENOUS BLD VENIPUNCTURE: CPT | Performed by: INTERNAL MEDICINE

## 2023-03-30 PROCEDURE — 80053 COMPREHEN METABOLIC PANEL: CPT | Performed by: INTERNAL MEDICINE

## 2023-03-30 PROCEDURE — 85025 COMPLETE CBC W/AUTO DIFF WBC: CPT | Performed by: INTERNAL MEDICINE

## 2023-03-30 PROCEDURE — 86300 IMMUNOASSAY TUMOR CA 15-3: CPT | Mod: 91 | Performed by: INTERNAL MEDICINE

## 2023-04-01 LAB
CANCER AG15-3 SERPL-ACNC: 24.5 U/ML (ref 0–25)
CANCER AG27-29 SERPL-ACNC: 33.3 U/ML (ref 0–38.6)

## 2023-04-18 ENCOUNTER — TELEPHONE (OUTPATIENT)
Dept: FAMILY MEDICINE | Facility: CLINIC | Age: 78
End: 2023-04-18
Payer: MEDICARE

## 2023-04-18 ENCOUNTER — OFFICE VISIT (OUTPATIENT)
Dept: FAMILY MEDICINE | Facility: CLINIC | Age: 78
End: 2023-04-18
Payer: MEDICARE

## 2023-04-18 VITALS
BODY MASS INDEX: 23.34 KG/M2 | RESPIRATION RATE: 16 BRPM | TEMPERATURE: 99 F | WEIGHT: 136.69 LBS | HEIGHT: 64 IN | DIASTOLIC BLOOD PRESSURE: 60 MMHG | SYSTOLIC BLOOD PRESSURE: 122 MMHG | OXYGEN SATURATION: 96 % | HEART RATE: 95 BPM

## 2023-04-18 DIAGNOSIS — J06.9 ACUTE URI: Primary | ICD-10-CM

## 2023-04-18 DIAGNOSIS — J40 BRONCHITIS: ICD-10-CM

## 2023-04-18 DIAGNOSIS — R06.2 WHEEZING: ICD-10-CM

## 2023-04-18 LAB
CTP QC/QA: YES
SARS-COV-2 RDRP RESP QL NAA+PROBE: NEGATIVE

## 2023-04-18 PROCEDURE — 87635: ICD-10-PCS | Mod: QW,S$GLB,, | Performed by: FAMILY MEDICINE

## 2023-04-18 PROCEDURE — 3288F FALL RISK ASSESSMENT DOCD: CPT | Mod: CPTII,S$GLB,, | Performed by: FAMILY MEDICINE

## 2023-04-18 PROCEDURE — 1101F PR PT FALLS ASSESS DOC 0-1 FALLS W/OUT INJ PAST YR: ICD-10-PCS | Mod: CPTII,S$GLB,, | Performed by: FAMILY MEDICINE

## 2023-04-18 PROCEDURE — 1160F RVW MEDS BY RX/DR IN RCRD: CPT | Mod: CPTII,S$GLB,, | Performed by: FAMILY MEDICINE

## 2023-04-18 PROCEDURE — 99213 OFFICE O/P EST LOW 20 MIN: CPT | Mod: 25,S$GLB,, | Performed by: FAMILY MEDICINE

## 2023-04-18 PROCEDURE — 96372 PR INJECTION,THERAP/PROPH/DIAG2ST, IM OR SUBCUT: ICD-10-PCS | Mod: S$GLB,,, | Performed by: FAMILY MEDICINE

## 2023-04-18 PROCEDURE — 3078F PR MOST RECENT DIASTOLIC BLOOD PRESSURE < 80 MM HG: ICD-10-PCS | Mod: CPTII,S$GLB,, | Performed by: FAMILY MEDICINE

## 2023-04-18 PROCEDURE — 1101F PT FALLS ASSESS-DOCD LE1/YR: CPT | Mod: CPTII,S$GLB,, | Performed by: FAMILY MEDICINE

## 2023-04-18 PROCEDURE — 1160F PR REVIEW ALL MEDS BY PRESCRIBER/CLIN PHARMACIST DOCUMENTED: ICD-10-PCS | Mod: CPTII,S$GLB,, | Performed by: FAMILY MEDICINE

## 2023-04-18 PROCEDURE — 1159F PR MEDICATION LIST DOCUMENTED IN MEDICAL RECORD: ICD-10-PCS | Mod: CPTII,S$GLB,, | Performed by: FAMILY MEDICINE

## 2023-04-18 PROCEDURE — 3288F PR FALLS RISK ASSESSMENT DOCUMENTED: ICD-10-PCS | Mod: CPTII,S$GLB,, | Performed by: FAMILY MEDICINE

## 2023-04-18 PROCEDURE — 99213 PR OFFICE/OUTPT VISIT, EST, LEVL III, 20-29 MIN: ICD-10-PCS | Mod: 25,S$GLB,, | Performed by: FAMILY MEDICINE

## 2023-04-18 PROCEDURE — 3074F PR MOST RECENT SYSTOLIC BLOOD PRESSURE < 130 MM HG: ICD-10-PCS | Mod: CPTII,S$GLB,, | Performed by: FAMILY MEDICINE

## 2023-04-18 PROCEDURE — 1159F MED LIST DOCD IN RCRD: CPT | Mod: CPTII,S$GLB,, | Performed by: FAMILY MEDICINE

## 2023-04-18 PROCEDURE — 1126F PR PAIN SEVERITY QUANTIFIED, NO PAIN PRESENT: ICD-10-PCS | Mod: CPTII,S$GLB,, | Performed by: FAMILY MEDICINE

## 2023-04-18 PROCEDURE — 87635 SARS-COV-2 COVID-19 AMP PRB: CPT | Mod: QW,S$GLB,, | Performed by: FAMILY MEDICINE

## 2023-04-18 PROCEDURE — 3078F DIAST BP <80 MM HG: CPT | Mod: CPTII,S$GLB,, | Performed by: FAMILY MEDICINE

## 2023-04-18 PROCEDURE — 99999 PR PBB SHADOW E&M-EST. PATIENT-LVL V: CPT | Mod: PBBFAC,,, | Performed by: FAMILY MEDICINE

## 2023-04-18 PROCEDURE — 99999 PR PBB SHADOW E&M-EST. PATIENT-LVL V: ICD-10-PCS | Mod: PBBFAC,,, | Performed by: FAMILY MEDICINE

## 2023-04-18 PROCEDURE — 96372 THER/PROPH/DIAG INJ SC/IM: CPT | Mod: S$GLB,,, | Performed by: FAMILY MEDICINE

## 2023-04-18 PROCEDURE — 1126F AMNT PAIN NOTED NONE PRSNT: CPT | Mod: CPTII,S$GLB,, | Performed by: FAMILY MEDICINE

## 2023-04-18 PROCEDURE — 3074F SYST BP LT 130 MM HG: CPT | Mod: CPTII,S$GLB,, | Performed by: FAMILY MEDICINE

## 2023-04-18 RX ORDER — ALBUTEROL SULFATE 90 UG/1
2 AEROSOL, METERED RESPIRATORY (INHALATION) 4 TIMES DAILY
Qty: 18 G | Refills: 0 | Status: SHIPPED | OUTPATIENT
Start: 2023-04-18

## 2023-04-18 RX ORDER — PROMETHAZINE HYDROCHLORIDE AND DEXTROMETHORPHAN HYDROBROMIDE 6.25; 15 MG/5ML; MG/5ML
SYRUP ORAL
COMMUNITY
Start: 2023-03-27

## 2023-04-18 RX ORDER — BETAMETHASONE SODIUM PHOSPHATE AND BETAMETHASONE ACETATE 3; 3 MG/ML; MG/ML
9 INJECTION, SUSPENSION INTRA-ARTICULAR; INTRALESIONAL; INTRAMUSCULAR; SOFT TISSUE
Status: COMPLETED | OUTPATIENT
Start: 2023-04-18 | End: 2023-04-18

## 2023-04-18 RX ORDER — DOXYCYCLINE 100 MG/1
100 CAPSULE ORAL 2 TIMES DAILY
Qty: 20 CAPSULE | Refills: 0 | Status: SHIPPED | OUTPATIENT
Start: 2023-04-18 | End: 2024-03-25 | Stop reason: ALTCHOICE

## 2023-04-18 RX ADMIN — BETAMETHASONE SODIUM PHOSPHATE AND BETAMETHASONE ACETATE 9 MG: 3; 3 INJECTION, SUSPENSION INTRA-ARTICULAR; INTRALESIONAL; INTRAMUSCULAR; SOFT TISSUE at 02:04

## 2023-04-18 NOTE — PROGRESS NOTES
Subjective:       Patient ID: Liliana Hairston is a 77 y.o. female.    Chief Complaint: No chief complaint on file.    Patient here for UC visit.  Initial onset 1 month ago and Rx 3/27 at an external UC and improved for a week and since again feels ill with cough, congestion, fatigue, scratchy throat and frontal HA's.   No fever or SOB or pl pain.  No GI symptoms.  Covid negative here today. Cough now producing some creamy sputum and she now has wheezing.    Review of Systems   Constitutional:  Negative for fever.   Respiratory:  Negative for shortness of breath.    Cardiovascular:  Negative for chest pain.   Gastrointestinal:  Negative for abdominal pain and nausea.   Skin:  Negative for rash.   All other systems reviewed and are negative.    Objective:      Physical Exam  Constitutional:       General: She is not in acute distress.     Appearance: She is well-developed.   HENT:      Right Ear: Tympanic membrane normal. Tympanic membrane is not erythematous.      Left Ear: Tympanic membrane normal. Tympanic membrane is not erythematous.      Nose: Mucosal edema present.      Right Sinus: No maxillary sinus tenderness.      Left Sinus: No maxillary sinus tenderness.      Mouth/Throat:      Pharynx: Posterior oropharyngeal erythema present.   Cardiovascular:      Rate and Rhythm: Normal rate and regular rhythm.      Heart sounds: No murmur heard.  Pulmonary:      Effort: Pulmonary effort is normal.      Breath sounds: Examination of the right-lower field reveals rhonchi. Wheezing and rhonchi present.   Musculoskeletal:      Cervical back: Neck supple.   Lymphadenopathy:      Cervical: No cervical adenopathy.   Skin:     General: Skin is warm and dry.       Assessment:       1. Acute URI    2. Bronchitis    3. Wheezing        Plan:       Acute URI  -     POCT COVID-19 Rapid Screening  -     betamethasone acetate-betamethasone sodium phosphate injection 9 mg    Bronchitis  -     doxycycline (MONODOX) 100 MG capsule;  Take 1 capsule (100 mg total) by mouth 2 (two) times daily.  Dispense: 20 capsule; Refill: 0  -     albuterol (PROVENTIL/VENTOLIN HFA) 90 mcg/actuation inhaler; Inhale 2 puffs into the lungs 4 (four) times daily.  Dispense: 18 g; Refill: 0  -     betamethasone acetate-betamethasone sodium phosphate injection 9 mg    Wheezing  -     betamethasone acetate-betamethasone sodium phosphate injection 9 mg      Patient Instructions   Push fluids intake.  Drink plenty of water.     Contact me or your PCP if any worsening or for any new concerns as we discussed.

## 2023-04-18 NOTE — TELEPHONE ENCOUNTER
----- Message from Aundrea Loredo sent at 4/18/2023 11:27 AM CDT -----  Contact: Pt at [phone]  Type:  Same Day Appointment Request    Caller is requesting a same day appointment.  Caller declined first available appointment listed below.      Name of Caller:  Pt  When is the first available appointment?  N/A  Symptoms:  Coughing feels as it is going down in chest, head ache and nose congested   Best Call Back Number:  [phone]  Additional Information:   Pt is calling for appt today.  Please call back to advise.

## 2023-04-20 ENCOUNTER — PATIENT MESSAGE (OUTPATIENT)
Dept: FAMILY MEDICINE | Facility: CLINIC | Age: 78
End: 2023-04-20
Payer: MEDICARE

## 2023-04-20 DIAGNOSIS — J40 BRONCHITIS: Primary | ICD-10-CM

## 2023-04-20 RX ORDER — PROMETHAZINE HYDROCHLORIDE AND DEXTROMETHORPHAN HYDROBROMIDE 6.25; 15 MG/5ML; MG/5ML
5 SYRUP ORAL 4 TIMES DAILY PRN
Qty: 240 ML | Refills: 0 | Status: SHIPPED | OUTPATIENT
Start: 2023-04-20

## 2023-04-25 ENCOUNTER — HOSPITAL ENCOUNTER (OUTPATIENT)
Dept: RADIOLOGY | Facility: CLINIC | Age: 78
Discharge: HOME | End: 2023-04-25
Attending: NURSE PRACTITIONER
Payer: MEDICARE

## 2023-04-25 ENCOUNTER — OFFICE VISIT (OUTPATIENT)
Dept: FAMILY MEDICINE | Facility: CLINIC | Age: 78
End: 2023-04-25
Payer: MEDICARE

## 2023-04-25 VITALS
HEIGHT: 64 IN | TEMPERATURE: 98 F | BODY MASS INDEX: 23.18 KG/M2 | DIASTOLIC BLOOD PRESSURE: 80 MMHG | OXYGEN SATURATION: 98 % | SYSTOLIC BLOOD PRESSURE: 110 MMHG | HEART RATE: 88 BPM | WEIGHT: 135.81 LBS

## 2023-04-25 DIAGNOSIS — R05.9 COUGH, UNSPECIFIED TYPE: Primary | ICD-10-CM

## 2023-04-25 DIAGNOSIS — J40 BRONCHITIS: ICD-10-CM

## 2023-04-25 DIAGNOSIS — R05.9 COUGH, UNSPECIFIED TYPE: ICD-10-CM

## 2023-04-25 PROCEDURE — 71046 X-RAY EXAM CHEST 2 VIEWS: CPT | Mod: 26,,, | Performed by: RADIOLOGY

## 2023-04-25 PROCEDURE — 1159F MED LIST DOCD IN RCRD: CPT | Mod: CPTII,S$GLB,, | Performed by: NURSE PRACTITIONER

## 2023-04-25 PROCEDURE — 3074F SYST BP LT 130 MM HG: CPT | Mod: CPTII,S$GLB,, | Performed by: NURSE PRACTITIONER

## 2023-04-25 PROCEDURE — 99213 OFFICE O/P EST LOW 20 MIN: CPT | Mod: S$GLB,,, | Performed by: NURSE PRACTITIONER

## 2023-04-25 PROCEDURE — 1160F RVW MEDS BY RX/DR IN RCRD: CPT | Mod: CPTII,S$GLB,, | Performed by: NURSE PRACTITIONER

## 2023-04-25 PROCEDURE — 3074F PR MOST RECENT SYSTOLIC BLOOD PRESSURE < 130 MM HG: ICD-10-PCS | Mod: CPTII,S$GLB,, | Performed by: NURSE PRACTITIONER

## 2023-04-25 PROCEDURE — 3288F FALL RISK ASSESSMENT DOCD: CPT | Mod: CPTII,S$GLB,, | Performed by: NURSE PRACTITIONER

## 2023-04-25 PROCEDURE — 3079F DIAST BP 80-89 MM HG: CPT | Mod: CPTII,S$GLB,, | Performed by: NURSE PRACTITIONER

## 2023-04-25 PROCEDURE — 99999 PR PBB SHADOW E&M-EST. PATIENT-LVL V: ICD-10-PCS | Mod: PBBFAC,,, | Performed by: NURSE PRACTITIONER

## 2023-04-25 PROCEDURE — 1125F PR PAIN SEVERITY QUANTIFIED, PAIN PRESENT: ICD-10-PCS | Mod: CPTII,S$GLB,, | Performed by: NURSE PRACTITIONER

## 2023-04-25 PROCEDURE — 1159F PR MEDICATION LIST DOCUMENTED IN MEDICAL RECORD: ICD-10-PCS | Mod: CPTII,S$GLB,, | Performed by: NURSE PRACTITIONER

## 2023-04-25 PROCEDURE — 1101F PR PT FALLS ASSESS DOC 0-1 FALLS W/OUT INJ PAST YR: ICD-10-PCS | Mod: CPTII,S$GLB,, | Performed by: NURSE PRACTITIONER

## 2023-04-25 PROCEDURE — 1125F AMNT PAIN NOTED PAIN PRSNT: CPT | Mod: CPTII,S$GLB,, | Performed by: NURSE PRACTITIONER

## 2023-04-25 PROCEDURE — 1160F PR REVIEW ALL MEDS BY PRESCRIBER/CLIN PHARMACIST DOCUMENTED: ICD-10-PCS | Mod: CPTII,S$GLB,, | Performed by: NURSE PRACTITIONER

## 2023-04-25 PROCEDURE — 99999 PR PBB SHADOW E&M-EST. PATIENT-LVL V: CPT | Mod: PBBFAC,,, | Performed by: NURSE PRACTITIONER

## 2023-04-25 PROCEDURE — 3288F PR FALLS RISK ASSESSMENT DOCUMENTED: ICD-10-PCS | Mod: CPTII,S$GLB,, | Performed by: NURSE PRACTITIONER

## 2023-04-25 PROCEDURE — 1101F PT FALLS ASSESS-DOCD LE1/YR: CPT | Mod: CPTII,S$GLB,, | Performed by: NURSE PRACTITIONER

## 2023-04-25 PROCEDURE — 99213 PR OFFICE/OUTPT VISIT, EST, LEVL III, 20-29 MIN: ICD-10-PCS | Mod: S$GLB,,, | Performed by: NURSE PRACTITIONER

## 2023-04-25 PROCEDURE — 71046 X-RAY EXAM CHEST 2 VIEWS: CPT | Mod: TC,FY,PO

## 2023-04-25 PROCEDURE — 71046 XR CHEST PA AND LATERAL: ICD-10-PCS | Mod: 26,,, | Performed by: RADIOLOGY

## 2023-04-25 PROCEDURE — 3079F PR MOST RECENT DIASTOLIC BLOOD PRESSURE 80-89 MM HG: ICD-10-PCS | Mod: CPTII,S$GLB,, | Performed by: NURSE PRACTITIONER

## 2023-04-25 RX ORDER — METHYLPREDNISOLONE 4 MG/1
TABLET ORAL
Qty: 21 EACH | Refills: 0 | Status: SHIPPED | OUTPATIENT
Start: 2023-04-25 | End: 2023-05-16

## 2023-04-25 RX ORDER — BENZONATATE 100 MG/1
100 CAPSULE ORAL 3 TIMES DAILY PRN
Qty: 30 CAPSULE | Refills: 0 | Status: SHIPPED | OUTPATIENT
Start: 2023-04-25 | End: 2023-05-05

## 2023-04-25 NOTE — PROGRESS NOTES
Subjective:       Patient ID: Liliana Hairston is a 77 y.o. female.    Chief Complaint: Cough, Sore Throat, and ear pain     HPI   76 y/o female patient with medical problems listed below presents for headache, right ear pain, sore throat, cough for one month. Patient was seen in the clinic on 4/18 for the problem. She was tested negative for Covid. She was provided doxycycline for 10 days, albuterol inhaler, steroid injection and promethazine dm. States cough is mostly dry to productive with clear phlegm. Symptoms are associated with nasal congestion, postnasal dripping. States had nose bleeding in the past. She states did not feel improved and symptoms are progressively worsening. She states has 2-3 more days to complete antibiotic. Denies chest pain, sob, nausea, abdominal pain, fever, chills, generalized body ache.     Patient Active Problem List   Diagnosis    Cancer    Primary osteoarthritis of right knee    Bilateral sensorineural hearing loss    Tinnitus of both ears    Diverticulosis of large intestine without hemorrhage    History of breast cancer    Acute midline low back pain without sciatica    Other hyperlipidemia    Intractable episodic cluster headache    Cough    Decreased range of motion of intervertebral discs of cervical spine    Tenderness    Decreased strength    Long term (current) use of aromatase inhibitors    Malignant neoplasm of upper-outer quadrant of right breast in female, estrogen receptor positive      Review of patient's allergies indicates:  No Known Allergies    Past Surgical History:   Procedure Laterality Date    APPENDECTOMY      BREAST SURGERY  1997    right mastectomy    COLONOSCOPY N/A 2/21/2018    Procedure: COLONOSCOPY;  Surgeon: Cristel Fritz MD;  [facility];  Service: Endoscopy;  Laterality: N/A;    EYE SURGERY      HYSTERECTOMY      LUNG SURGERY  1998    remove cyst on top of right lobe    right mastectomy  2015          Current Outpatient Medications:      albuterol (PROVENTIL/VENTOLIN HFA) 90 mcg/actuation inhaler, Inhale 2 puffs into the lungs 4 (four) times daily., Disp: 18 g, Rfl: 0    anastrozole (ARIMIDEX) 1 mg Tab, Take 1 tablet (1 mg total) by mouth once daily., Disp: 90 tablet, Rfl: 3    azelastine (ASTELIN) 137 mcg (0.1 %) nasal spray, 1 spray (137 mcg total) by Nasal route 2 (two) times daily as needed for Rhinitis., Disp: 30 mL, Rfl: 4    CALCIUM CARBONATE/VITAMIN D3 (CALCIUM 500 + D ORAL), Take 1 each by mouth daily as needed. , Disp: , Rfl:     dicyclomine (BENTYL) 20 mg tablet, Take 1 tablet (20 mg total) by mouth 3 (three) times daily as needed (stomache)., Disp: 90 tablet, Rfl: 1    doxycycline (MONODOX) 100 MG capsule, Take 1 capsule (100 mg total) by mouth 2 (two) times daily., Disp: 20 capsule, Rfl: 0    magnesium 30 mg Tab, Take 400 mg by mouth once., Disp: , Rfl:     meloxicam (MOBIC) 15 MG tablet, Take 1 tablet (15 mg total) by mouth daily as needed for Pain., Disp: 90 tablet, Rfl: 3    montelukast (SINGULAIR) 10 mg tablet, Take 1 tablet (10 mg total) by mouth every evening., Disp: 90 tablet, Rfl: 3    multivitamin (THERAGRAN) per tablet, Take 1 tablet by mouth once daily., Disp: , Rfl:     nortriptyline (PAMELOR) 10 MG capsule, Take 2 capsules (20 mg total) by mouth every evening., Disp: 180 capsule, Rfl: 3    omega-3 fatty acids/fish oil (FISH OIL-OMEGA-3 FATTY ACIDS) 300-1,000 mg capsule, Take by mouth once daily., Disp: , Rfl:     oxymetazoline (AFRIN, OXYMETAZOLINE,) 0.05 % nasal spray, 2 spray tid x 3 days for severe congestion or bleeding, Disp: 30 mL, Rfl: 2    promethazine-dextromethorphan (PROMETHAZINE-DM) 6.25-15 mg/5 mL Syrp, TAKE 5 TO 10 ML BY MOUTH EVERY 8 HOURS AS NEEDED FOR COUGH. MAY CAUSE DROWSINESS, Disp: , Rfl:     promethazine-dextromethorphan (PROMETHAZINE-DM) 6.25-15 mg/5 mL Syrp, Take 5 mLs by mouth 4 (four) times daily as needed (cough)., Disp: 240 mL, Rfl: 0    triamcinolone acetonide 0.1% (KENALOG) 0.1 % cream, Apply  "topically daily as needed. , Disp: , Rfl: 3    (Magic mouthwash) 1:1:1 diphenhydrAMINE(Benadryl) 12.5mg/5ml liq, aluminum & magnesium hydroxide-simethicone (Maalox), LIDOcaine viscous 2%, Swish and spit 5 mLs 4 (four) times daily as needed (sore throat). for mouth sores, Disp: 360 mL, Rfl: 0    benzonatate (TESSALON) 100 MG capsule, Take 1 capsule (100 mg total) by mouth 3 (three) times daily as needed for Cough., Disp: 30 capsule, Rfl: 0    methylPREDNISolone (MEDROL DOSEPACK) 4 mg tablet, use as directed, Disp: 21 each, Rfl: 0    Current Facility-Administered Medications:     acetaminophen tablet 650 mg, 650 mg, Oral, Once PRN, Karthik Ford MD    diphenhydrAMINE injection 25 mg, 25 mg, Intravenous, Once PRN, Karthik Ford MD    methylPREDNISolone sodium succinate injection 40 mg, 40 mg, Intravenous, Once PRN, Karthik Ford MD    Lab Results   Component Value Date    WBC 7.93 03/30/2023    HGB 13.1 03/30/2023    HCT 39.5 03/30/2023     03/30/2023    CHOL 179 12/01/2022    TRIG 165 (H) 12/01/2022    HDL 51 12/01/2022    ALT 18 03/30/2023    AST 17 03/30/2023     03/30/2023    K 3.9 03/30/2023     03/30/2023    CREATININE 0.6 03/30/2023    BUN 13 03/30/2023    CO2 27 03/30/2023    TSH 0.946 11/16/2010     Review of Systems   Constitutional:  Negative for chills and fever.   HENT:  Positive for congestion, ear pain, postnasal drip and sore throat. Negative for ear discharge, sinus pressure and sinus pain.    Respiratory:  Positive for cough. Negative for chest tightness and shortness of breath.    Cardiovascular:  Negative for chest pain and palpitations.   Gastrointestinal:  Negative for abdominal pain.   Neurological:  Negative for dizziness and headaches.       Objective:   /80 (BP Location: Right arm, Patient Position: Sitting, BP Method: Medium (Manual))   Pulse 88   Temp 98.2 °F (36.8 °C) (Oral)   Ht 5' 4" (1.626 m)   Wt 61.6 kg (135 lb 12.9 oz)   SpO2 98%   BMI 23.31 kg/m²     "     Physical Exam  Constitutional:       General: She is not in acute distress.     Appearance: Normal appearance.   HENT:      Head: Atraumatic.   Cardiovascular:      Rate and Rhythm: Normal rate and regular rhythm.      Pulses: Normal pulses.      Heart sounds: Normal heart sounds.   Pulmonary:      Effort: Pulmonary effort is normal.      Breath sounds: Normal breath sounds.   Abdominal:      General: Abdomen is flat. Bowel sounds are normal.      Palpations: Abdomen is soft.   Skin:     General: Skin is warm and dry.   Neurological:      General: No focal deficit present.      Mental Status: She is alert and oriented to person, place, and time.   Psychiatric:         Mood and Affect: Mood normal.       Assessment:       1. Cough, unspecified type    2. Bronchitis        Plan:       1. Cough, unspecified type  - X-Ray Chest PA And Lateral; Future  - benzonatate (TESSALON) 100 MG capsule; Take 1 capsule (100 mg total) by mouth 3 (three) times daily as needed for Cough.  Dispense: 30 capsule; Refill: 0    2. Bronchitis  - Patient is NAD, VSS  - Continue with Doxycycline   - Continue with nasal spray and/or Claritin   - methylPREDNISolone (MEDROL DOSEPACK) 4 mg tablet; use as directed  Dispense: 21 each; Refill: 0  - (Magic mouthwash) 1:1:1 diphenhydrAMINE(Benadryl) 12.5mg/5ml liq, aluminum & magnesium hydroxide-simethicone (Maalox), LIDOcaine viscous 2%; Swish and spit 5 mLs 4 (four) times daily as needed (sore throat). for mouth sores  Dispense: 360 mL; Refill: 0   - Advised to call us if no improvement or worsening symptoms     Yousuf SANCHEZ

## 2023-06-09 ENCOUNTER — OFFICE VISIT (OUTPATIENT)
Dept: FAMILY MEDICINE | Facility: CLINIC | Age: 78
End: 2023-06-09
Payer: MEDICARE

## 2023-06-09 VITALS
BODY MASS INDEX: 23.18 KG/M2 | WEIGHT: 135.81 LBS | HEART RATE: 60 BPM | HEIGHT: 64 IN | SYSTOLIC BLOOD PRESSURE: 110 MMHG | DIASTOLIC BLOOD PRESSURE: 78 MMHG | OXYGEN SATURATION: 98 % | TEMPERATURE: 98 F

## 2023-06-09 DIAGNOSIS — Z85.3 HISTORY OF BREAST CANCER: ICD-10-CM

## 2023-06-09 DIAGNOSIS — H93.13 TINNITUS OF BOTH EARS: Primary | ICD-10-CM

## 2023-06-09 DIAGNOSIS — G44.209 TENSION-TYPE HEADACHE, NOT INTRACTABLE, UNSPECIFIED CHRONICITY PATTERN: ICD-10-CM

## 2023-06-09 DIAGNOSIS — E78.2 MIXED DYSLIPIDEMIA: ICD-10-CM

## 2023-06-09 PROCEDURE — 3074F SYST BP LT 130 MM HG: CPT | Mod: CPTII,S$GLB,, | Performed by: NURSE PRACTITIONER

## 2023-06-09 PROCEDURE — 1101F PR PT FALLS ASSESS DOC 0-1 FALLS W/OUT INJ PAST YR: ICD-10-PCS | Mod: CPTII,S$GLB,, | Performed by: NURSE PRACTITIONER

## 2023-06-09 PROCEDURE — 3078F DIAST BP <80 MM HG: CPT | Mod: CPTII,S$GLB,, | Performed by: NURSE PRACTITIONER

## 2023-06-09 PROCEDURE — 1101F PT FALLS ASSESS-DOCD LE1/YR: CPT | Mod: CPTII,S$GLB,, | Performed by: NURSE PRACTITIONER

## 2023-06-09 PROCEDURE — 99999 PR PBB SHADOW E&M-EST. PATIENT-LVL V: CPT | Mod: PBBFAC,,, | Performed by: NURSE PRACTITIONER

## 2023-06-09 PROCEDURE — 3288F FALL RISK ASSESSMENT DOCD: CPT | Mod: CPTII,S$GLB,, | Performed by: NURSE PRACTITIONER

## 2023-06-09 PROCEDURE — 1160F RVW MEDS BY RX/DR IN RCRD: CPT | Mod: CPTII,S$GLB,, | Performed by: NURSE PRACTITIONER

## 2023-06-09 PROCEDURE — 3074F PR MOST RECENT SYSTOLIC BLOOD PRESSURE < 130 MM HG: ICD-10-PCS | Mod: CPTII,S$GLB,, | Performed by: NURSE PRACTITIONER

## 2023-06-09 PROCEDURE — 99213 PR OFFICE/OUTPT VISIT, EST, LEVL III, 20-29 MIN: ICD-10-PCS | Mod: S$GLB,,, | Performed by: NURSE PRACTITIONER

## 2023-06-09 PROCEDURE — 1126F AMNT PAIN NOTED NONE PRSNT: CPT | Mod: CPTII,S$GLB,, | Performed by: NURSE PRACTITIONER

## 2023-06-09 PROCEDURE — 99213 OFFICE O/P EST LOW 20 MIN: CPT | Mod: S$GLB,,, | Performed by: NURSE PRACTITIONER

## 2023-06-09 PROCEDURE — 99999 PR PBB SHADOW E&M-EST. PATIENT-LVL V: ICD-10-PCS | Mod: PBBFAC,,, | Performed by: NURSE PRACTITIONER

## 2023-06-09 PROCEDURE — 1159F PR MEDICATION LIST DOCUMENTED IN MEDICAL RECORD: ICD-10-PCS | Mod: CPTII,S$GLB,, | Performed by: NURSE PRACTITIONER

## 2023-06-09 PROCEDURE — 1126F PR PAIN SEVERITY QUANTIFIED, NO PAIN PRESENT: ICD-10-PCS | Mod: CPTII,S$GLB,, | Performed by: NURSE PRACTITIONER

## 2023-06-09 PROCEDURE — 3078F PR MOST RECENT DIASTOLIC BLOOD PRESSURE < 80 MM HG: ICD-10-PCS | Mod: CPTII,S$GLB,, | Performed by: NURSE PRACTITIONER

## 2023-06-09 PROCEDURE — 1160F PR REVIEW ALL MEDS BY PRESCRIBER/CLIN PHARMACIST DOCUMENTED: ICD-10-PCS | Mod: CPTII,S$GLB,, | Performed by: NURSE PRACTITIONER

## 2023-06-09 PROCEDURE — 3288F PR FALLS RISK ASSESSMENT DOCUMENTED: ICD-10-PCS | Mod: CPTII,S$GLB,, | Performed by: NURSE PRACTITIONER

## 2023-06-09 PROCEDURE — 1159F MED LIST DOCD IN RCRD: CPT | Mod: CPTII,S$GLB,, | Performed by: NURSE PRACTITIONER

## 2023-06-09 NOTE — PROGRESS NOTES
Subjective:       Patient ID: Liliana Hairston is a 77 y.o. female.    Chief Complaint: Follow-up     HPI   78 y/o female patient with medical problems listed below presents for follow up. She states coughing resolved but still has right ear pain and progressively worsening tinnitus in b/l ear. Patient states is followed by dermatology for rosasea, seen a month ago, and also given topical cream for itching in right outer ear.     Patient is followed by med/oncology for hx of breast cancer- on arimidex  Patient is followed by neurology for headache- on magnesium and nortriptyline    Labs reviewed from 12/2022    Patient Active Problem List   Diagnosis    Cancer    Primary osteoarthritis of right knee    Bilateral sensorineural hearing loss    Tinnitus of both ears    Diverticulosis of large intestine without hemorrhage    History of breast cancer    Acute midline low back pain without sciatica    Other hyperlipidemia    Intractable episodic cluster headache    Cough    Decreased range of motion of intervertebral discs of cervical spine    Tenderness    Decreased strength    Long term (current) use of aromatase inhibitors    Malignant neoplasm of upper-outer quadrant of right breast in female, estrogen receptor positive      Review of patient's allergies indicates:  No Known Allergies    Past Surgical History:   Procedure Laterality Date    APPENDECTOMY      BREAST SURGERY  1997    right mastectomy    COLONOSCOPY N/A 2/21/2018    Procedure: COLONOSCOPY;  Surgeon: Cristel Fritz MD;  Location: [facility];  Service: Endoscopy;  Laterality: N/A;    EYE SURGERY      HYSTERECTOMY      LUNG SURGERY  1998    remove cyst on top of right lobe    right mastectomy  2015        Current Outpatient Medications:     (Magic mouthwash) 1:1:1 diphenhydrAMINE(Benadryl) 12.5mg/5ml liq, aluminum & magnesium hydroxide-simethicone (Maalox), LIDOcaine viscous 2%, Swish and spit 5 mLs 4 (four) times daily as needed (sore throat). for mouth  sores, Disp: 360 mL, Rfl: 0    albuterol (PROVENTIL/VENTOLIN HFA) 90 mcg/actuation inhaler, Inhale 2 puffs into the lungs 4 (four) times daily., Disp: 18 g, Rfl: 0    anastrozole (ARIMIDEX) 1 mg Tab, Take 1 tablet (1 mg total) by mouth once daily., Disp: 90 tablet, Rfl: 3    azelastine (ASTELIN) 137 mcg (0.1 %) nasal spray, 1 spray (137 mcg total) by Nasal route 2 (two) times daily as needed for Rhinitis., Disp: 30 mL, Rfl: 4    CALCIUM CARBONATE/VITAMIN D3 (CALCIUM 500 + D ORAL), Take 1 each by mouth daily as needed. , Disp: , Rfl:     dicyclomine (BENTYL) 20 mg tablet, Take 1 tablet (20 mg total) by mouth 3 (three) times daily as needed (stomache)., Disp: 90 tablet, Rfl: 1    doxycycline (MONODOX) 100 MG capsule, Take 1 capsule (100 mg total) by mouth 2 (two) times daily., Disp: 20 capsule, Rfl: 0    magnesium 30 mg Tab, Take 400 mg by mouth once., Disp: , Rfl:     meloxicam (MOBIC) 15 MG tablet, Take 1 tablet (15 mg total) by mouth daily as needed for Pain., Disp: 90 tablet, Rfl: 3    montelukast (SINGULAIR) 10 mg tablet, Take 1 tablet (10 mg total) by mouth every evening., Disp: 90 tablet, Rfl: 3    multivitamin (THERAGRAN) per tablet, Take 1 tablet by mouth once daily., Disp: , Rfl:     nortriptyline (PAMELOR) 10 MG capsule, Take 2 capsules (20 mg total) by mouth every evening., Disp: 180 capsule, Rfl: 3    omega-3 fatty acids/fish oil (FISH OIL-OMEGA-3 FATTY ACIDS) 300-1,000 mg capsule, Take by mouth once daily., Disp: , Rfl:     oxymetazoline (AFRIN, OXYMETAZOLINE,) 0.05 % nasal spray, 2 spray tid x 3 days for severe congestion or bleeding, Disp: 30 mL, Rfl: 2    promethazine-dextromethorphan (PROMETHAZINE-DM) 6.25-15 mg/5 mL Syrp, TAKE 5 TO 10 ML BY MOUTH EVERY 8 HOURS AS NEEDED FOR COUGH. MAY CAUSE DROWSINESS, Disp: , Rfl:     promethazine-dextromethorphan (PROMETHAZINE-DM) 6.25-15 mg/5 mL Syrp, Take 5 mLs by mouth 4 (four) times daily as needed (cough)., Disp: 240 mL, Rfl: 0    triamcinolone acetonide 0.1%  "(KENALOG) 0.1 % cream, Apply topically daily as needed. , Disp: , Rfl: 3    Current Facility-Administered Medications:     acetaminophen tablet 650 mg, 650 mg, Oral, Once PRN, Karthik Ford MD    diphenhydrAMINE injection 25 mg, 25 mg, Intravenous, Once PRN, Karthik Ford MD    methylPREDNISolone sodium succinate injection 40 mg, 40 mg, Intravenous, Once PRN, Karthik Ford MD    Lab Results   Component Value Date    WBC 7.93 03/30/2023    HGB 13.1 03/30/2023    HCT 39.5 03/30/2023     03/30/2023    CHOL 179 12/01/2022    TRIG 165 (H) 12/01/2022    HDL 51 12/01/2022    ALT 18 03/30/2023    AST 17 03/30/2023     03/30/2023    K 3.9 03/30/2023     03/30/2023    CREATININE 0.6 03/30/2023    BUN 13 03/30/2023    CO2 27 03/30/2023    TSH 0.946 11/16/2010     Review of Systems   Constitutional:  Negative for chills and fever.   HENT:  Positive for ear pain and tinnitus. Negative for congestion, ear discharge, postnasal drip and sinus pain.    Respiratory:  Negative for cough, chest tightness and shortness of breath.    Cardiovascular:  Negative for chest pain and palpitations.   Gastrointestinal:  Negative for abdominal pain.   Neurological:  Negative for dizziness and headaches.       Objective:   /78 (BP Location: Left arm, Patient Position: Sitting, BP Method: Medium (Manual))   Pulse 60   Temp 97.8 °F (36.6 °C) (Oral)   Ht 5' 4" (1.626 m)   Wt 61.6 kg (135 lb 12.9 oz)   SpO2 98%   BMI 23.31 kg/m²         Physical Exam  Constitutional:       General: She is not in acute distress.     Appearance: Normal appearance.   HENT:      Head: Atraumatic.   Cardiovascular:      Rate and Rhythm: Normal rate and regular rhythm.      Pulses: Normal pulses.      Heart sounds: Normal heart sounds.   Pulmonary:      Effort: Pulmonary effort is normal.      Breath sounds: Normal breath sounds.   Abdominal:      General: Abdomen is flat. Bowel sounds are normal.      Palpations: Abdomen is soft.   Skin:     " General: Skin is warm and dry.   Neurological:      General: No focal deficit present.      Mental Status: She is alert and oriented to person, place, and time.   Psychiatric:         Mood and Affect: Mood normal.       Assessment:       1. Tinnitus of both ears    2. Mixed dyslipidemia    3. History of breast cancer    4. Tension-type headache, not intractable, unspecified chronicity pattern        Plan:       1. Mixed dyslipidemia  - Hemoglobin A1C; Future  - CBC Auto Differential; Future  - Comprehensive Metabolic Panel; Future  - Lipid Panel; Future  - TSH; Future    2. History of breast cancer  - continue to f/u with med/oncology    3. Tinnitus of both ears  - Ambulatory referral/consult to ENT; Future    4. Tension-type headache, not intractable, unspecified chronicity pattern  - stable and continue with current regimen     Patient with be reevaluated in  as scheduled in 11/2023  or sooner ray To NP

## 2023-06-15 ENCOUNTER — OFFICE VISIT (OUTPATIENT)
Dept: OTOLARYNGOLOGY | Facility: CLINIC | Age: 78
End: 2023-06-15
Payer: MEDICARE

## 2023-06-15 VITALS
HEART RATE: 65 BPM | BODY MASS INDEX: 23.18 KG/M2 | WEIGHT: 135.81 LBS | DIASTOLIC BLOOD PRESSURE: 76 MMHG | HEIGHT: 64 IN | SYSTOLIC BLOOD PRESSURE: 124 MMHG

## 2023-06-15 DIAGNOSIS — M26.629 TMJPDS (TEMPOROMANDIBULAR JOINT PAIN DYSFUNCTION SYNDROME): ICD-10-CM

## 2023-06-15 DIAGNOSIS — H93.13 TINNITUS OF BOTH EARS: ICD-10-CM

## 2023-06-15 DIAGNOSIS — H93.8X1 EAR FULLNESS, RIGHT: Primary | ICD-10-CM

## 2023-06-15 DIAGNOSIS — L29.9 ITCHING OF EAR: ICD-10-CM

## 2023-06-15 DIAGNOSIS — H91.13 PRESBYCUSIS, BILATERAL: ICD-10-CM

## 2023-06-15 PROCEDURE — 3074F SYST BP LT 130 MM HG: CPT | Mod: CPTII,S$GLB,, | Performed by: OTOLARYNGOLOGY

## 2023-06-15 PROCEDURE — 1101F PT FALLS ASSESS-DOCD LE1/YR: CPT | Mod: CPTII,S$GLB,, | Performed by: OTOLARYNGOLOGY

## 2023-06-15 PROCEDURE — 1160F PR REVIEW ALL MEDS BY PRESCRIBER/CLIN PHARMACIST DOCUMENTED: ICD-10-PCS | Mod: CPTII,S$GLB,, | Performed by: OTOLARYNGOLOGY

## 2023-06-15 PROCEDURE — 1126F AMNT PAIN NOTED NONE PRSNT: CPT | Mod: CPTII,S$GLB,, | Performed by: OTOLARYNGOLOGY

## 2023-06-15 PROCEDURE — 1126F PR PAIN SEVERITY QUANTIFIED, NO PAIN PRESENT: ICD-10-PCS | Mod: CPTII,S$GLB,, | Performed by: OTOLARYNGOLOGY

## 2023-06-15 PROCEDURE — 99214 PR OFFICE/OUTPT VISIT, EST, LEVL IV, 30-39 MIN: ICD-10-PCS | Mod: S$GLB,,, | Performed by: OTOLARYNGOLOGY

## 2023-06-15 PROCEDURE — 3078F PR MOST RECENT DIASTOLIC BLOOD PRESSURE < 80 MM HG: ICD-10-PCS | Mod: CPTII,S$GLB,, | Performed by: OTOLARYNGOLOGY

## 2023-06-15 PROCEDURE — 3078F DIAST BP <80 MM HG: CPT | Mod: CPTII,S$GLB,, | Performed by: OTOLARYNGOLOGY

## 2023-06-15 PROCEDURE — 3288F PR FALLS RISK ASSESSMENT DOCUMENTED: ICD-10-PCS | Mod: CPTII,S$GLB,, | Performed by: OTOLARYNGOLOGY

## 2023-06-15 PROCEDURE — 99999 PR PBB SHADOW E&M-EST. PATIENT-LVL V: ICD-10-PCS | Mod: PBBFAC,,, | Performed by: OTOLARYNGOLOGY

## 2023-06-15 PROCEDURE — 3288F FALL RISK ASSESSMENT DOCD: CPT | Mod: CPTII,S$GLB,, | Performed by: OTOLARYNGOLOGY

## 2023-06-15 PROCEDURE — 1159F MED LIST DOCD IN RCRD: CPT | Mod: CPTII,S$GLB,, | Performed by: OTOLARYNGOLOGY

## 2023-06-15 PROCEDURE — 1101F PR PT FALLS ASSESS DOC 0-1 FALLS W/OUT INJ PAST YR: ICD-10-PCS | Mod: CPTII,S$GLB,, | Performed by: OTOLARYNGOLOGY

## 2023-06-15 PROCEDURE — 3074F PR MOST RECENT SYSTOLIC BLOOD PRESSURE < 130 MM HG: ICD-10-PCS | Mod: CPTII,S$GLB,, | Performed by: OTOLARYNGOLOGY

## 2023-06-15 PROCEDURE — 1159F PR MEDICATION LIST DOCUMENTED IN MEDICAL RECORD: ICD-10-PCS | Mod: CPTII,S$GLB,, | Performed by: OTOLARYNGOLOGY

## 2023-06-15 PROCEDURE — 1160F RVW MEDS BY RX/DR IN RCRD: CPT | Mod: CPTII,S$GLB,, | Performed by: OTOLARYNGOLOGY

## 2023-06-15 PROCEDURE — 99214 OFFICE O/P EST MOD 30 MIN: CPT | Mod: S$GLB,,, | Performed by: OTOLARYNGOLOGY

## 2023-06-15 PROCEDURE — 99999 PR PBB SHADOW E&M-EST. PATIENT-LVL V: CPT | Mod: PBBFAC,,, | Performed by: OTOLARYNGOLOGY

## 2023-06-15 RX ORDER — FLUOCINOLONE ACETONIDE 0.11 MG/ML
OIL AURICULAR (OTIC)
Qty: 20 ML | Refills: 0 | Status: SHIPPED | OUTPATIENT
Start: 2023-06-15 | End: 2024-03-25 | Stop reason: SDUPTHER

## 2023-06-15 NOTE — PROGRESS NOTES
[facility]    Subjective:      Patient: Liliana Hairston Patient PCP: Karthik Ford MD         :  1945     Sex:  female      MRN:  [id number]          Date of Visit: 06/15/2023      Chief Complaint: Tinnitus (C/o chronic bilateral ringing in ears for several years, which has progressed in the last 2 months. Right >Left. Describes as a constant high pitched noise. Also reports itchiness to right ear and + night pain when lying on right side for about 2 months now. No h/o being a smoker. Not currently taking anything for symptom relief with the exception of Betamethasone 0.05% topically to right ear BID q.day for itchiness, which offers her relief from itching. Audio not done.)      Patient ID: Liliana Hairston is a 77 y.o. female lifelong NON-smoker with a prior visit with our office in [place] in  which showed significant bilateral sloping sensorineural hearing loss consistent with presbycusis bilateral without asymmetry who returns today with complaints of worsening years long tinnitus most notable in just last 2 months without any focal change in hearing or asymmetry.  She does report some itchiness and even pain in the right side which is not completely resolved with betamethasone to the right ear.  No audiogram today.  Audiogram from 2020 reviewed as above.    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  She feels there has been some worsening of her right ear fullness.  She was prescribed betamethasone valerate for itching which he is used some without resolution.  No sudden change in hearing.  Had a URI 6 or so weeks ago but did not have any specific change in hearing or fullness with that episode.        Review of Systems     Past Medical History  She has a past medical history of Breast cancer, Cancer, Cataract, and Headache.    Family / Surgical / Social History  Her family history includes ALS in her sister; Alzheimer's disease in her maternal grandmother; Breast  cancer in her maternal aunt; Cancer in her maternal aunt and mother; Colon cancer in her mother; Diverticulitis in her son; Heart disease in her maternal uncle; No Known Problems in her brother and father.    Past Surgical History:   Procedure Laterality Date    APPENDECTOMY      BREAST SURGERY  1997    right mastectomy    COLONOSCOPY N/A 2/21/2018    Procedure: COLONOSCOPY;  Surgeon: Cristel Fritz MD;  Location: [facility];  Service: Endoscopy;  Laterality: N/A;    EYE SURGERY      HYSTERECTOMY      LUNG SURGERY  1998    remove cyst on top of right lobe    right mastectomy  2015       Social History     Tobacco Use    Smoking status: Never    Smokeless tobacco: Never   Substance and Sexual Activity    Alcohol use: Yes     Alcohol/week: 4.0 standard drinks     Types: 2 Glasses of wine, 2 Cans of beer per week     Comment: 2x per week wine or beer    Drug use: No    Sexual activity: Not on file       Medications  She has a current medication list which includes the following prescription(s): diphenhydramine hcl, albuterol, anastrozole, azelastine, calcium carbonate/vitamin d3, dicyclomine, doxycycline, magnesium, meloxicam, montelukast, multivitamin, nortriptyline, fish oil-omega-3 fatty acids, oxymetazoline, promethazine-dextromethorphan, promethazine-dextromethorphan, and triamcinolone acetonide 0.1%, and the following Facility-Administered Medications: acetaminophen, diphenhydramine, and methylprednisolone sodium succinate.      Allergies  Review of patient's allergies indicates:  No Known Allergies    All medications, allergies, and past history have been reviewed.    Objective:      Vitals:  Vitals - 1 value per visit 6/9/2023 6/15/2023 6/15/2023   SYSTOLIC 110 - 124   DIASTOLIC 78 - 76   Pulse 60 - 65   Temp 97.8 - -   Resp - - -   SPO2 98 - -   Weight (lb) 135.8 - 135.8   Weight (kg) 61.6 - 61.6   Height 64 - 64   BMI (Calculated) 23.3 - 23.3   VISIT REPORT - - -   Pain Score  - 0 -   Some recent data  might be hidden       Body surface area is 1.67 meters squared.    Physical Exam:    GENERAL  APPEARANCE -  alert, appears stated age, and cooperative  BARRIER(S) TO COMMUNICATION -  none VOICE - appropriate for age and gender    INTEGUMENTARY  no suspicious head and neck lesions    HEENT  HEAD: Normocephalic, without obvious abnormality, atraumatic  FACE: INSPECTION - Symmetric, no signs of trauma, no suspicious lesion(s)  PALPATION -  No masses TMJ with minimal right crepitus w/o gross pain or pop, some asymmetry SALIVARY GLANDS - non-tender with no appreciable mass  STRENGTH - facial symmetry  NECK/THYROID: normal atraumatic, no neck masses, normal thyroid, no jvd    EYES  Normal occular alignment and mobility with no visible nystagmus at rest    EARS/NOSE/MOUTH/THROAT  EARS  PINNAE AND EXTERNAL EARS - no suspicious lesion OTOSCOPIC EXAM (surgical microscopy was used for visualization/instrumentation): EAR EXAM - Normal ear canals, tympanic membranes and mobility, and middle ear spaces bilaterally.  HEARING - grossly intact to voice/finger rub    NOSE AND SINUSES  EXTERNAL NOSE - Grossly normal for age/sex  SEPTUM - normal/no obstruction on anterior exam without decongestion TURBINATES - within normal limits MUCOSA - within normal limits     MOUTH AND THROAT   ORAL CAVITY, LIPS, TEETH, GUMS & TONGUE - moist, no suspicious lesions  OROPHARYNX /TONSILS/PHARYNGEAL WALLS/HYPOPHARYNX - no erythema or exudates  NASOPHARYNX - limited mirror exam - unable to visualize due to anatomy/gag  LARYNX -  - limited mirror exam - unable to visualize due to anatomy/gag      CHEST AND LUNG   INSPECTION & AUSCULTATION - normal effort, no stridor    CARDIOVASCULAR  AUSCULTATION & PERIPHERAL VASCULAR - regular rate and rhythm.    NEUROLOGIC  MENTAL STATUS - alert, interactive CRANIAL NERVES - normal    LYMPHATIC  HEAD AND NECK - non-palpable      Procedure(s):  None    Labs:  WBC   Date Value Ref Range Status   03/30/2023 7.93 3.90 -  12.70 K/uL Final     Hemoglobin   Date Value Ref Range Status   03/30/2023 13.1 12.0 - 16.0 g/dL Final     Platelets   Date Value Ref Range Status   03/30/2023 334 150 - 450 K/uL Final     Creatinine   Date Value Ref Range Status   03/30/2023 0.6 0.5 - 1.4 mg/dL Final     TSH   Date Value Ref Range Status   11/16/2010 0.946 0.4 - 4.0 uIU/ml Final     Glucose   Date Value Ref Range Status   03/30/2023 90 70 - 110 mg/dL Final         Assessment:      Problem List Items Addressed This Visit          ENT    Tinnitus of both ears     Other Visit Diagnoses       Ear fullness, right    -  Primary    Itching of ear        TMJPDS (temporomandibular joint pain dysfunction syndrome)        Presbycusis, bilateral                     Plan:      Discontinue the betamethasone instead use the derm otic oil prescribed twice daily for a week no more than 2 as outlined.  If not effective for control of itching call for alternative therapy.  If affective then move on to routine ear care as outlined with nightly mineral oil.    Some fullness of the right ear discomfort with a known history of bruxism is favored to be musculoskeletal/TMJ related as discussed.  We will get a full hearing test on return to determine if there has been worsening of hearing who explain the asymmetry of symptoms in terms of discomfort/fullness or the worsening of tinnitus.  Hearing aids may very well be appropriate depending upon findings at that time.      TMJ care instructions provided as well as a referral to physical therapy.  This should be discussed further with a dental professional as per outlined instructions.

## 2023-06-15 NOTE — PATIENT INSTRUCTIONS
Discontinue the betamethasone instead use the derm otic oil prescribed twice daily for a week no more than 2 as outlined.  If not effective for control of itching call for alternative therapy.  If affective then move on to routine ear care as outlined with nightly mineral oil.    Some fullness of the right ear discomfort with a known history of bruxism is favored to be musculoskeletal/TMJ related as discussed.  We will get a full hearing test on return to determine if there has been worsening of hearing who explain the asymmetry of symptoms in terms of discomfort/fullness or the worsening of tinnitus.  Hearing aids may very well be appropriate depending upon findings at that time.      TMJ care instructions provided as well as a referral to physical therapy.  This should be discussed further with a dental professional as per outlined instructions.      DERMOTIC/FLUOCINOLONE OIL    Use prescribed fluocinolone/derm otic oil to both ears smearing around the outer ear scaling areas as well as down in the ear canal twice daily for a week no more than 2. At this point follow a routine ear care as outlined.  If not improved with this dose of steroid a higher concentration steroid may prove necessary.    ROUTINE EAR CARE    Keep the ears dry in general.  Water and soap dry the ears increases scaling by stripping away the natural oils of the ears.    A twisted single ply of facial tissue can be used to wick out moisture on the rare occasion when water gets stuck in the ear; or the water can be displaced with concentrated alcohol like OTC SwimEar or a few drops of 72-95% isopropyl alcohol to fill the ear canal.  Regular use will cause excess drying of the ears.    The ear should be kept moist in general with mineral oil. Three to four drops into the ear canal 2 to 3 times a week or even every night.    If the ears need to be irrigated use either a 50 50 mixture of white vinegar and distilled water or 50 50 mixture of alcohol  and white vinegar.    Painful draining ears that do not resolve with conservative care could represent infection and may need microscopic office debridement/clearing of the wax, and topical antibiotic drops with or without steroids may need to be prescribed.        TMJ Syndrome / Sprain    This is a condition with chronic or recurrent pain in the joint of the jaw (in front of the ear). Just like all other joints in the body (knees, hips, etc.) the jaw joint can be sprained or chronically injured over time. The jaw joint capsule can wear out just like other joints in the body.    The pain may cause limited motion of the jaw, a locking or catching sensation, clicking, popping, or grinding sounds from the joint with movement. It is a very common cause of headache, earache or neck pain. Other symptoms include ringing in the ear, and pressure/fullness of the ear.    The nerve that gives sensation to the jaw joint also gives sensation to the ear and part of the face. This is why pain in the face or ear can be coming from the jaw joint. It is sometimes caused by inflammation in the joint, injury or wear-and-tear of the cartilage in the joint, involuntary grinding of the teeth or poorly fitting dentures. Emotional stress and tension are often a factor. Most cases resolve completely within a few months with proper treatment.    Home Care:  1) Rest the jaw by avoiding crunchy or hard foods to chew. Do not eat hard or sticky candies. Soft foods and liquids are easier on the jaw. Protect your jaw while yawning.  2) Hot compress (small towel soaked in hot water or heating pad) applied to the jaw may give relief by reducing muscle spasm. Some people get relief with cold packs, so try both and see which one works best for you.  3) You may use ibuprofen (Motrin, Advil) to control pain, unless another medicine was prescribed.   If you weight between 130 and 150 lb, you may take 600 mg of Ibuprofen every 6 hours as needed for a few  weeks, then less frequently following this. If you weight > 150 lb, you may take 800 mg every 6 hours for the same time period. Extended use of Ibuprofen is typically OK, but not every 6 hours (usually one or two times per day.)   [ NOTE : If you have chronic liver or kidney disease or ever had a stomach ulcer or GI bleeding, talk with your doctor before using these medicines.]  4) If you suspect emotional stress is related to your condition.  a) Try to identify the sources of stress in your life. It may not be obvious! These may include:  -- Daily hassles of life that pile up (traffic jams, missed appointments, car troubles)  -- Major life changes, both good (new baby, job promotion) and bad (loss of job, loss of loved one)  -- Overload: feeling that you have too many responsibilities and can't take care of everything at once  -- Helplessness: feeling like your problems are more than you can solve  b) When possible, do something about the source of your stress: avoid hassles, limit the amount of change that is happening in your life at one time and take a break when you feel overloaded.  c) Unfortunately, many stressful situations cannot be avoided. Therefore, it is necessary to learn HOW TO MANAGE STRESS better. There are many proven methods that work and will reduce your anxiety. These include simple things like exercise, good nutrition and adequate rest. Also, there are certain techniques that are helpful: relaxation and breathing exercises, visualization, biofeedback, meditation or simply taking some time-out to clear your mind. For more information about this, consult your doctor or go to a local bookstore and review the many books and tapes available on this subject.    Mouthguards for Grinding:  Some TMJ issues are worsened by nightly teeth grinding. This can create muscle tension and strain on the jaw joint. Most mouthguards protect the teeth and do NOT reduce the clenching. If the goal is to reduce  "clenching, I recommend trying an over the counter nightguard called "SmartGuard."  This can be purchased over-the-counter and is available through vendors such as [employer] and [employer].   This mouthguard fits only on the front teeth. As a result, it prevents your molars from contacting, preventing a forceful clench. This should be worn for brief period of time (weeks or months) as it can alter the bite with prolonged use. Most people will notice improvement during this time.    "

## 2023-06-21 ENCOUNTER — OFFICE VISIT (OUTPATIENT)
Dept: NEUROLOGY | Facility: CLINIC | Age: 78
End: 2023-06-21
Payer: MEDICARE

## 2023-06-21 VITALS
RESPIRATION RATE: 17 BRPM | BODY MASS INDEX: 23.31 KG/M2 | SYSTOLIC BLOOD PRESSURE: 119 MMHG | WEIGHT: 136.56 LBS | HEART RATE: 85 BPM | HEIGHT: 64 IN | DIASTOLIC BLOOD PRESSURE: 71 MMHG

## 2023-06-21 DIAGNOSIS — M26.609 TMJ (TEMPOROMANDIBULAR JOINT SYNDROME): ICD-10-CM

## 2023-06-21 DIAGNOSIS — G44.221 CHRONIC TENSION-TYPE HEADACHE, INTRACTABLE: Primary | ICD-10-CM

## 2023-06-21 PROCEDURE — 99213 PR OFFICE/OUTPT VISIT, EST, LEVL III, 20-29 MIN: ICD-10-PCS | Mod: S$GLB,,, | Performed by: NURSE PRACTITIONER

## 2023-06-21 PROCEDURE — 3288F FALL RISK ASSESSMENT DOCD: CPT | Mod: CPTII,S$GLB,, | Performed by: NURSE PRACTITIONER

## 2023-06-21 PROCEDURE — 99999 PR PBB SHADOW E&M-EST. PATIENT-LVL V: ICD-10-PCS | Mod: PBBFAC,,, | Performed by: NURSE PRACTITIONER

## 2023-06-21 PROCEDURE — 3074F SYST BP LT 130 MM HG: CPT | Mod: CPTII,S$GLB,, | Performed by: NURSE PRACTITIONER

## 2023-06-21 PROCEDURE — 3074F PR MOST RECENT SYSTOLIC BLOOD PRESSURE < 130 MM HG: ICD-10-PCS | Mod: CPTII,S$GLB,, | Performed by: NURSE PRACTITIONER

## 2023-06-21 PROCEDURE — 1159F PR MEDICATION LIST DOCUMENTED IN MEDICAL RECORD: ICD-10-PCS | Mod: CPTII,S$GLB,, | Performed by: NURSE PRACTITIONER

## 2023-06-21 PROCEDURE — 1159F MED LIST DOCD IN RCRD: CPT | Mod: CPTII,S$GLB,, | Performed by: NURSE PRACTITIONER

## 2023-06-21 PROCEDURE — 1160F PR REVIEW ALL MEDS BY PRESCRIBER/CLIN PHARMACIST DOCUMENTED: ICD-10-PCS | Mod: CPTII,S$GLB,, | Performed by: NURSE PRACTITIONER

## 2023-06-21 PROCEDURE — 1125F PR PAIN SEVERITY QUANTIFIED, PAIN PRESENT: ICD-10-PCS | Mod: CPTII,S$GLB,, | Performed by: NURSE PRACTITIONER

## 2023-06-21 PROCEDURE — 3078F PR MOST RECENT DIASTOLIC BLOOD PRESSURE < 80 MM HG: ICD-10-PCS | Mod: CPTII,S$GLB,, | Performed by: NURSE PRACTITIONER

## 2023-06-21 PROCEDURE — 3078F DIAST BP <80 MM HG: CPT | Mod: CPTII,S$GLB,, | Performed by: NURSE PRACTITIONER

## 2023-06-21 PROCEDURE — 1125F AMNT PAIN NOTED PAIN PRSNT: CPT | Mod: CPTII,S$GLB,, | Performed by: NURSE PRACTITIONER

## 2023-06-21 PROCEDURE — 3288F PR FALLS RISK ASSESSMENT DOCUMENTED: ICD-10-PCS | Mod: CPTII,S$GLB,, | Performed by: NURSE PRACTITIONER

## 2023-06-21 PROCEDURE — 1101F PT FALLS ASSESS-DOCD LE1/YR: CPT | Mod: CPTII,S$GLB,, | Performed by: NURSE PRACTITIONER

## 2023-06-21 PROCEDURE — 1160F RVW MEDS BY RX/DR IN RCRD: CPT | Mod: CPTII,S$GLB,, | Performed by: NURSE PRACTITIONER

## 2023-06-21 PROCEDURE — 99213 OFFICE O/P EST LOW 20 MIN: CPT | Mod: S$GLB,,, | Performed by: NURSE PRACTITIONER

## 2023-06-21 PROCEDURE — 99999 PR PBB SHADOW E&M-EST. PATIENT-LVL V: CPT | Mod: PBBFAC,,, | Performed by: NURSE PRACTITIONER

## 2023-06-21 PROCEDURE — 1101F PR PT FALLS ASSESS DOC 0-1 FALLS W/OUT INJ PAST YR: ICD-10-PCS | Mod: CPTII,S$GLB,, | Performed by: NURSE PRACTITIONER

## 2023-06-21 NOTE — PROGRESS NOTES
Date of service: 6/21/2023  Referring provider: No ref. provider found    Subjective:      Chief complaint: Headache       Patient ID: Liliana Hairston is a 77 y.o. female with bilateral hearing loss, personal history of breast cancer who presents for follow up of headache    History of Present Illness    INTERVAL HISTORY 6/21/23    Last visit was six months ago and at that time she was doing better.    Today she reports she is doing well. Headaches occur front, forehead. Current pain 2 with range 0-8. She has headaches 2-3 days per week. She takes tylenol occasionally. She has had four nosebleeds since winter. Once in the shower, once while folding clothes.  Otherwise information below is reviewed and verified with no changes made     INTERVAL HISTORY 12/20/22    Last visit was three months ago with Dr. Gage and at that time she was doing better. Plan was to decrease nortriptyline due to side effects.    Today she reports she is a little better. She reports a few headache days per month. Typically headaches are weather related. No longer having severe headaches at night. Current head pain 0 with range 0-3. She takes tylenol which has been effective.     ORIGINAL HEADACHE HISTORY - from 1/2021 with Dr. Gage  Age at onset and course over time: whole life (more frequent x2 year)  Location: left temple  Character: dull/pressure, and less throbbing  Worse during evenings   Intensity: 1-3 /10 on average and occasionally 9/10 (<1 time per month)  Frequency: daily  Duration: constant  Aura: [place] with only intense headaches (since young age, improved over the years, still having it every few months)  Associated symptoms: None, denies any light/sound/smell sensitivity, denies any physical activity aggravating headache or avoiding physical activity due to worsening headache  Precipitating factors: not unknown  Alleviating factors: medications  Aggravating factors: sneezing, sleep disturbance  Family history of  headache: No  ER visits: 0   Sleep: no history of ADRIANO. Wakes up twice for bathroom. 6-7 hours of sleep/night    Current acute treatment:  Tizanidine    Current prevention:  Nortriptyline - 20 mg QHS    Previously tried/failed acute treatment:  None    Previously tried/failed preventative treatment:  None     Review of patient's allergies indicates:  No Known Allergies  Current Outpatient Medications   Medication Sig Dispense Refill    (Magic mouthwash) 1:1:1 diphenhydrAMINE(Benadryl) 12.5mg/5ml liq, aluminum & magnesium hydroxide-simethicone (Maalox), LIDOcaine viscous 2% Swish and spit 5 mLs 4 (four) times daily as needed (sore throat). for mouth sores 360 mL 0    albuterol (PROVENTIL/VENTOLIN HFA) 90 mcg/actuation inhaler Inhale 2 puffs into the lungs 4 (four) times daily. 18 g 0    anastrozole (ARIMIDEX) 1 mg Tab Take 1 tablet (1 mg total) by mouth once daily. 90 tablet 3    CALCIUM CARBONATE/VITAMIN D3 (CALCIUM 500 + D ORAL) Take 1 each by mouth daily as needed.       dicyclomine (BENTYL) 20 mg tablet Take 1 tablet (20 mg total) by mouth 3 (three) times daily as needed (stomache). 90 tablet 1    doxycycline (MONODOX) 100 MG capsule Take 1 capsule (100 mg total) by mouth 2 (two) times daily. 20 capsule 0    fluocinolone acetonide oiL 0.01 % Drop 3-4 drops to the affected ear(s) twice daily no more than a week at a time as needed for itching and scaling 20 mL 0    magnesium 30 mg Tab Take 400 mg by mouth once.      meloxicam (MOBIC) 15 MG tablet Take 1 tablet (15 mg total) by mouth daily as needed for Pain. 90 tablet 3    montelukast (SINGULAIR) 10 mg tablet Take 1 tablet (10 mg total) by mouth every evening. 90 tablet 3    multivitamin (THERAGRAN) per tablet Take 1 tablet by mouth once daily.      nortriptyline (PAMELOR) 10 MG capsule Take 2 capsules (20 mg total) by mouth every evening. 180 capsule 3    omega-3 fatty acids/fish oil (FISH OIL-OMEGA-3 FATTY ACIDS) 300-1,000 mg capsule Take by mouth once daily.       oxymetazoline (AFRIN, OXYMETAZOLINE,) 0.05 % nasal spray 2 spray tid x 3 days for severe congestion or bleeding 30 mL 2    promethazine-dextromethorphan (PROMETHAZINE-DM) 6.25-15 mg/5 mL Syrp TAKE 5 TO 10 ML BY MOUTH EVERY 8 HOURS AS NEEDED FOR COUGH. MAY CAUSE DROWSINESS      promethazine-dextromethorphan (PROMETHAZINE-DM) 6.25-15 mg/5 mL Syrp Take 5 mLs by mouth 4 (four) times daily as needed (cough). 240 mL 0    triamcinolone acetonide 0.1% (KENALOG) 0.1 % cream Apply topically daily as needed.   3    azelastine (ASTELIN) 137 mcg (0.1 %) nasal spray 1 spray (137 mcg total) by Nasal route 2 (two) times daily as needed for Rhinitis. 30 mL 4     Current Facility-Administered Medications   Medication Dose Route Frequency Provider Last Rate Last Admin    acetaminophen tablet 650 mg  650 mg Oral Once PRN Karthik Ford MD        diphenhydrAMINE injection 25 mg  25 mg Intravenous Once PRN Karthik Ford MD        methylPREDNISolone sodium succinate injection 40 mg  40 mg Intravenous Once PRN Karthik Ford MD           Past Medical History  Past Medical History:   Diagnosis Date    Breast cancer     Cancer     breast, right '97    Cataract     Headache        Past Surgical History  Past Surgical History:   Procedure Laterality Date    APPENDECTOMY      BREAST SURGERY  1997    right mastectomy    COLONOSCOPY N/A 2/21/2018    Procedure: COLONOSCOPY;  Surgeon: Cristel Fritz MD;  [facility];  Service: Endoscopy;  Laterality: N/A;    EYE SURGERY      HYSTERECTOMY      LUNG SURGERY  1998    remove cyst on top of right lobe    right mastectomy  2015       Family History  Family History   Problem Relation Age of Onset    Cancer Mother     Colon cancer Mother     No Known Problems Father     Breast cancer Maternal Aunt     Cancer Maternal Aunt     ALS Sister     No Known Problems Brother     Diverticulitis Son     Heart disease Maternal Uncle     Alzheimer's disease Maternal Grandmother     Ovarian cancer Neg Hx         Social History  Social History     Socioeconomic History    Marital status:    Tobacco Use    Smoking status: Never    Smokeless tobacco: Never   Substance and Sexual Activity    Alcohol use: Yes     Alcohol/week: 4.0 standard drinks     Types: 2 Glasses of wine, 2 Cans of beer per week     Comment: 2x per week wine or beer    Drug use: No        Review of Systems  14-point review of systems as follows:   No check kirill indicates NEGATIVE response   Constitutional: [] weight loss, [] change to appetite   Eyes: [] change in vision, [] double vision   Ears, nose, mouth, throat: [x] frequent nose bleeds, [x] ringing in the ears   Respiratory: [] cough, [] wheezing   Cardiovascular: [] chest pain, [] palpitations   Gastrointestinal: [] jaundice, [] nausea/vomiting   Genitourinary: [] incontinence, [] burning with urination   Hematologic/lymphatic: [] easy bruising/bleeding, [x] night sweats   Neurological: [] numbness, [] weakness   Endocrine: [] fatigue, [x] heat/cold intolerance   Allergy/Immunologic: [] fevers, [] chills   Musculoskeletal: [] muscle pain, [] joint pain   Psychiatric: [] thoughts of harming self/others, [] depression   Integumentary: [] rashes, [] sores that do not heal     Objective:        Vitals:    06/21/23 1449   BP: 119/71   Pulse: 85   Resp: 17       Body mass index is 23.44 kg/m².    6/21/23  Constitutional:   She appears well-developed and well-nourished. She is well groomed     Neurological Exam:  General: well-developed, well-nourished, no distress  Mental status: Awake and alert  Speech language: No dysarthria or aphasia on conversation  Cranial nerves: Face symmetric  Motor: Moves all extremities well  Coordination: No ataxia. No tremor.    Data Review:     I have personally reviewed the referring provider's notes, labs, & imaging made available to me today.      RADIOLOGY STUDIES:  I have personally reviewed the pertinent images performed.       Results for orders placed or  performed during the hospital encounter of 01/07/21   MRI Brain W WO Contrast    Narrative    EXAMINATION:  MRI BRAIN W WO CONTRAST    CLINICAL HISTORY:  new onset daily headache, history of breast CA;.  Headache, unspecified    TECHNIQUE:  Multiplanar multisequence MR imaging of the brain was performed before and after the administration of 6 mL Gadavist intravenous contrast.  Diffusion-weighted imaging was performed.  ADC map was generated.    COMPARISON:  None.    FINDINGS:  Intracranial compartment:    There is no acute or significant abnormality.  There is no intracranial hemorrhage.  There is no mass or mass effect.  There are no regions of restricted diffusion to suggest acute infarction.  There is only mild volume loss.  There is no hydrocephalus or midline shift.  There is a mild burden of periventricular and scattered subcortical white matter FLAIR and T2 hyperintense signal.  These findings are nonspecific but in a patient of this age, likely reflect sequelae of chronic small vessel disease.  There is no abnormal enhancement in the brain.  There is no abnormal extra-axial fluid collection.  The basilar cisterns are open.  Flow voids indicating patency are present in the major vessels at the base of the brain.  The cerebellar tonsils are normal position.  Sellar structures are normal.  The patient has had bilateral lens replacement surgery.  Otherwise, the orbits are normal.    Skull/extracranial contents:    Baseline marrow signal is normal.  The paranasal sinuses and mastoid air cells are clear.      Impression    1. There is no acute or significant abnormality.  There is mild volume loss and nonspecific white matter change.  There is no intracranial hemorrhage, mass, mass effect, acute infarction or abnormal enhancement.      Electronically signed by: Kevin Garzon MD  Date:    01/07/2021  Time:    13:58       Lab Results   Component Value Date     03/30/2023     10/06/2017    K 3.9  03/30/2023     03/30/2023     10/06/2017    CO2 27 03/30/2023    BUN 13 03/30/2023    CREATININE 0.6 03/30/2023    CREATININE 0.50 (L) 10/06/2017    GLU 90 03/30/2023     (H) 10/06/2017    AST 17 03/30/2023    ALT 18 03/30/2023    ALBUMIN 4.1 03/30/2023    ALBUMIN 4.4 10/06/2017    PROT 7.0 03/30/2023    BILITOT 0.6 03/30/2023    CHOL 179 12/01/2022    HDL 51 12/01/2022    LDLCALC 95.0 12/01/2022    TRIG 165 (H) 12/01/2022       Lab Results   Component Value Date    WBC 7.93 03/30/2023    HGB 13.1 03/30/2023    HCT 39.5 03/30/2023    MCV 94 03/30/2023     03/30/2023       Lab Results   Component Value Date    TSH 0.946 11/16/2010           Assessment & Plan:       Problem List Items Addressed This Visit    None  Visit Diagnoses       Chronic tension-type headache, intractable    -  Primary    Continues to do well on nortriptyline. Continue tylenol as needed     TMJ (temporomandibular joint syndrome)        Offered PT referral but she wishes to hold off for now                   Please call our clinic at [phone] or send a message on the [employer] portal if there are any changes to the plan described below, for example,if you are not contacted for the requested tests, referral(s) within one week, if you are unable to receive the medications prescribed, or if you feel you need to change the treatment course for any reason.     TESTING:  -- none    REFERRALS:  -- none    PREVENTION (use daily regardless of headache):  -- continue magnesium and nortriptyline    AS-NEEDED TREATMENT (use total no more than 10 days per month unless otherwise stated):  -- continue tylenol     Follow up in about 1 year (around 6/21/2024).    Deborah Suggs NP

## 2023-06-21 NOTE — PATIENT INSTRUCTIONS
Please call our clinic at [phone] or send a message on the [facility] portal if there are any changes to the plan described below, for example,if you are not contacted for the requested tests, referral(s) within one week, if you are unable to receive the medications prescribed, or if you feel you need to change the treatment course for any reason.     TESTING:  -- none    REFERRALS:  -- none but can refer to PT for TMJ symptoms     PREVENTION (use daily regardless of headache):  -- continue magnesium and nortriptyline  -- you can try taking Singulair at bedtime for sinus headaches     AS-NEEDED TREATMENT (use total no more than 10 days per month unless otherwise stated):  -- continue tylenol   -- you can try sinus flushes for nosebleeds

## 2023-06-22 ENCOUNTER — PATIENT MESSAGE (OUTPATIENT)
Dept: NEUROLOGY | Facility: CLINIC | Age: 78
End: 2023-06-22
Payer: MEDICARE

## 2023-06-22 RX ORDER — MONTELUKAST SODIUM 10 MG/1
10 TABLET ORAL NIGHTLY
Qty: 90 TABLET | Refills: 3 | Status: SHIPPED | OUTPATIENT
Start: 2023-06-22 | End: 2024-03-25 | Stop reason: SDUPTHER

## 2023-07-06 ENCOUNTER — CLINICAL SUPPORT (OUTPATIENT)
Dept: AUDIOLOGY | Facility: CLINIC | Age: 78
End: 2023-07-06
Payer: MEDICARE

## 2023-07-06 ENCOUNTER — OFFICE VISIT (OUTPATIENT)
Dept: OTOLARYNGOLOGY | Facility: CLINIC | Age: 78
End: 2023-07-06
Payer: MEDICARE

## 2023-07-06 DIAGNOSIS — H93.13 TINNITUS OF BOTH EARS: Primary | ICD-10-CM

## 2023-07-06 DIAGNOSIS — H93.13 TINNITUS OF BOTH EARS: ICD-10-CM

## 2023-07-06 DIAGNOSIS — H90.3 BILATERAL SENSORINEURAL HEARING LOSS: ICD-10-CM

## 2023-07-06 DIAGNOSIS — M26.629 TMJPDS (TEMPOROMANDIBULAR JOINT PAIN DYSFUNCTION SYNDROME): ICD-10-CM

## 2023-07-06 DIAGNOSIS — L29.9 ITCHING OF EAR: ICD-10-CM

## 2023-07-06 DIAGNOSIS — H93.8X1 EAR FULLNESS, RIGHT: ICD-10-CM

## 2023-07-06 DIAGNOSIS — H91.13 PRESBYCUSIS, BILATERAL: ICD-10-CM

## 2023-07-06 DIAGNOSIS — H93.8X1 EAR FULLNESS, RIGHT: Primary | ICD-10-CM

## 2023-07-06 PROCEDURE — 99214 PR OFFICE/OUTPT VISIT, EST, LEVL IV, 30-39 MIN: ICD-10-PCS | Mod: S$GLB,,, | Performed by: OTOLARYNGOLOGY

## 2023-07-06 PROCEDURE — 92557 PR COMPREHENSIVE HEARING TEST: ICD-10-PCS | Mod: S$GLB,,, | Performed by: AUDIOLOGIST

## 2023-07-06 PROCEDURE — 99999 PR PBB SHADOW E&M-EST. PATIENT-LVL II: CPT | Mod: PBBFAC,,, | Performed by: OTOLARYNGOLOGY

## 2023-07-06 PROCEDURE — 99999 PR PBB SHADOW E&M-EST. PATIENT-LVL II: ICD-10-PCS | Mod: PBBFAC,,, | Performed by: OTOLARYNGOLOGY

## 2023-07-06 PROCEDURE — 92557 COMPREHENSIVE HEARING TEST: CPT | Mod: S$GLB,,, | Performed by: AUDIOLOGIST

## 2023-07-06 PROCEDURE — 1160F RVW MEDS BY RX/DR IN RCRD: CPT | Mod: CPTII,S$GLB,, | Performed by: OTOLARYNGOLOGY

## 2023-07-06 PROCEDURE — 1160F PR REVIEW ALL MEDS BY PRESCRIBER/CLIN PHARMACIST DOCUMENTED: ICD-10-PCS | Mod: CPTII,S$GLB,, | Performed by: OTOLARYNGOLOGY

## 2023-07-06 PROCEDURE — 92567 TYMPANOMETRY: CPT | Mod: S$GLB,,, | Performed by: AUDIOLOGIST

## 2023-07-06 PROCEDURE — 1159F MED LIST DOCD IN RCRD: CPT | Mod: CPTII,S$GLB,, | Performed by: OTOLARYNGOLOGY

## 2023-07-06 PROCEDURE — 1159F PR MEDICATION LIST DOCUMENTED IN MEDICAL RECORD: ICD-10-PCS | Mod: CPTII,S$GLB,, | Performed by: OTOLARYNGOLOGY

## 2023-07-06 PROCEDURE — 92567 PR TYMPA2METRY: ICD-10-PCS | Mod: S$GLB,,, | Performed by: AUDIOLOGIST

## 2023-07-06 PROCEDURE — 99214 OFFICE O/P EST MOD 30 MIN: CPT | Mod: S$GLB,,, | Performed by: OTOLARYNGOLOGY

## 2023-07-06 NOTE — PROGRESS NOTES
Liliana Hairston was seen 07/06/2023 for an audiological evaluation. Pt was alone during today's visit. Pertinent complaints today include hearing loss and tinnitus. Pt denies history of loud noise exposure and denies early onset of genetic family history of hearing loss. Otoscopy revealed no cerumen in both ears. The tympanic membrane was visualized AU prior to proceeding with the hearing testing.     Results reveal a mild-to-severe sensorineural hearing loss for the right ear and  mild-to-severe sensorineural hearing loss for the left ear.    Speech Reception Thresholds were  25 dBHL for the right ear and 25 dBHL for the left ear.    Word recognition scores were excellent for the right ear and excellent for the left ear.   Tympanograms were Type A for the right ear and Type A for the left ear.    Recommendations: 1) Follow up with ENT     2) Annual hearing evaluation     3) Hearing aid consult when ready    Audiogram results were reviewed in detail with patient and all questions were answered. Results will be reviewed by the referring provider at the completion of this note. All complaints were addressed during this visit to the patient's satisfaction.

## 2023-07-06 NOTE — PROGRESS NOTES
[facility]    Subjective:      Patient: Liliana Hairston Patient PCP: Karthik Ford MD         :  1945     Sex:  female      MRN:  [id number]          Date of Visit: 2023      Chief Complaint: Follow-up (3 week follow up on ear fullness. Audiogram done today. )      2023 follow-up visit: Liliana Hairston is a 77 y.o. female lifelong NON-smoker with a prior visit with our office in [place] in  which showed significant bilateral sloping sensorineural hearing loss consistent with presbycusis bilateral without asymmetry who returns today after initial consultation 06/15/2023 with complaints of bilateral tinnitus with previously documented sloping sensorineural hearing loss without marked asymmetry in  as well as some complaints of ear pain which responded to topical steroids physical findings and history consistent with some ear fullness associated to bruxism/TMJ PDS.  Information and treatment options discussed and provided.  Audiogram with essentially stable pattern of hearing loss some slight worsening bilaterally at all frequencies with an increased symmetry.  Normal tympanometry and no evidence of conductive hearing loss.  Patient did use feel tinnitus perhaps even worse.  She only really hears it in quiet environments.  It is not causing anxiety or depression.  She has not discussed her TMJ with her dental professional.  She is wearing her mouth guard.  She is not undergone physical therapy.  It remains unchanged in terms of her right ear fullness.  She has had some relief of itching/discomfort with her topical steroids.           Patient ID: Liliana Hairston is a 77 y.o. female lifelong NON-smoker with a prior visit with our office in [place] in  which showed significant bilateral sloping sensorineural hearing loss consistent with presbycusis bilateral without asymmetry who returns today with complaints of worsening years long tinnitus most notable in just last 2 months without  any focal change in hearing or asymmetry.  She does report some itchiness and even pain in the right side which is not completely resolved with betamethasone to the right ear.  No audiogram today.  Audiogram from 2020 reviewed as above.    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  She feels there has been some worsening of her right ear fullness.  She was prescribed betamethasone valerate for itching which he is used some without resolution.  No sudden change in hearing.  Had a URI 6 or so weeks ago but did not have any specific change in hearing or fullness with that episode.        Review of Systems     Past Medical History  She has a past medical history of Breast cancer, Cancer, Cataract, and Headache.    Family / Surgical / Social History  Her family history includes ALS in her sister; Alzheimer's disease in her maternal grandmother; Breast cancer in her maternal aunt; Cancer in her maternal aunt and mother; Colon cancer in her mother; Diverticulitis in her son; Heart disease in her maternal uncle; No Known Problems in her brother and father.    Past Surgical History:   Procedure Laterality Date    APPENDECTOMY      BREAST SURGERY  1997    right mastectomy    COLONOSCOPY N/A 2/21/2018    Procedure: COLONOSCOPY;  Surgeon: Cristel Fritz MD;  Location: [facility];  Service: Endoscopy;  Laterality: N/A;    EYE SURGERY      HYSTERECTOMY      LUNG SURGERY  1998    remove cyst on top of right lobe    right mastectomy  2015       Social History     Tobacco Use    Smoking status: Never    Smokeless tobacco: Never   Substance and Sexual Activity    Alcohol use: Yes     Alcohol/week: 4.0 standard drinks     Types: 2 Glasses of wine, 2 Cans of beer per week     Comment: 2x per week wine or beer    Drug use: No    Sexual activity: Not on file       Medications  She has a current medication list which includes the following prescription(s): diphenhydramine hcl, albuterol, anastrozole,  azelastine, calcium carbonate/vitamin d3, dicyclomine, doxycycline, fluocinolone acetonide oil, magnesium, meloxicam, montelukast, multivitamin, nortriptyline, fish oil-omega-3 fatty acids, oxymetazoline, promethazine-dextromethorphan, promethazine-dextromethorphan, and triamcinolone acetonide 0.1%, and the following Facility-Administered Medications: acetaminophen, diphenhydramine, and methylprednisolone sodium succinate.      Allergies  Review of patient's allergies indicates:  No Known Allergies    All medications, allergies, and past history have been reviewed.    Objective:      Vitals:  Vitals - 1 value per visit 6/15/2023 6/21/2023 6/21/2023   SYSTOLIC 124 - 119   DIASTOLIC 76 - 71   Pulse 65 - 85   Temp - - -   Resp - - 17   SPO2 - - -   Weight (lb) 135.8 - 136.58   Weight (kg) 61.6 - 61.95   Height 64 - 64   BMI (Calculated) 23.3 - 23.4   VISIT REPORT - - -   Pain Score  - 2 -   Some recent data might be hidden       There is no height or weight on file to calculate BSA.    Physical Exam:    GENERAL  APPEARANCE -  alert, appears stated age, and cooperative  BARRIER(S) TO COMMUNICATION -  none VOICE - appropriate for age and gender    INTEGUMENTARY  no suspicious head and neck lesions    HEENT  HEAD: Normocephalic, without obvious abnormality, atraumatic  FACE: INSPECTION - Symmetric, no signs of trauma, no suspicious lesion(s)  PALPATION -  No masses TMJ with minimal right crepitus w/o gross pain or pop, some asymmetry SALIVARY GLANDS - non-tender with no appreciable mass  STRENGTH - facial symmetry  NECK/THYROID: normal atraumatic, no neck masses, normal thyroid, no jvd    EYES  Normal occular alignment and mobility with no visible nystagmus at rest    EARS/NOSE/MOUTH/THROAT  EARS  PINNAE AND EXTERNAL EARS - no suspicious lesion OTOSCOPIC EXAM (surgical microscopy was not used for visualization/instrumentation): EAR EXAM - Normal ear canals, tympanic membranes and mobility, and middle ear spaces  bilaterally.  HEARING - grossly intact to voice/finger rub    NOSE AND SINUSES  EXTERNAL NOSE - Grossly normal for age/sex  SEPTUM - normal/no obstruction on anterior exam without decongestion TURBINATES - within normal limits MUCOSA - within normal limits     MOUTH AND THROAT   ORAL CAVITY, LIPS, TEETH, GUMS & TONGUE - moist, no suspicious lesions  OROPHARYNX /TONSILS/PHARYNGEAL WALLS/HYPOPHARYNX - no erythema or exudates  NASOPHARYNX - limited mirror exam - unable to visualize due to anatomy/gag  LARYNX -  - limited mirror exam - unable to visualize due to anatomy/gag      CHEST AND LUNG   INSPECTION & AUSCULTATION - normal effort, no stridor    CARDIOVASCULAR  AUSCULTATION & PERIPHERAL VASCULAR - regular rate and rhythm.    NEUROLOGIC  MENTAL STATUS - alert, interactive CRANIAL NERVES - normal    LYMPHATIC  HEAD AND NECK - non-palpable      Procedure(s):  None    Labs:  WBC   Date Value Ref Range Status   03/30/2023 7.93 3.90 - 12.70 K/uL Final     Hemoglobin   Date Value Ref Range Status   03/30/2023 13.1 12.0 - 16.0 g/dL Final     Platelets   Date Value Ref Range Status   03/30/2023 334 150 - 450 K/uL Final     Creatinine   Date Value Ref Range Status   03/30/2023 0.6 0.5 - 1.4 mg/dL Final     TSH   Date Value Ref Range Status   11/16/2010 0.946 0.4 - 4.0 uIU/ml Final     Glucose   Date Value Ref Range Status   03/30/2023 90 70 - 110 mg/dL Final         Assessment:      Problem List Items Addressed This Visit          ENT    Tinnitus of both ears     Other Visit Diagnoses       Ear fullness, right    -  Primary    Itching of ear        Presbycusis, bilateral        TMJPDS (temporomandibular joint pain dysfunction syndrome)                       Plan:      Routine ear care as outlined with as needed derm otic oil for more significant itching.    Some fullness of the right ear discomfort with a known history of bruxism is favored to be musculoskeletal/TMJ related as discussed.      Consider hearing aid  consultation specifically for masking as discussed.  Consider using a white noise generator or janett on the phone including with the calm janett which may also benefit with guided meditation/mindfulness exercises.    TMJ care instructions provided as well as a referral to physical therapy.  This should be discussed further with a dental professional as per outlined instructions.      Annual audiogram. Return with any worsening of symptoms, failure to improve, or any other concerns for further evaluation and treatment.

## 2023-07-06 NOTE — PATIENT INSTRUCTIONS
Routine ear care as outlined with as needed derm otic oil for more significant itching.    Some fullness of the right ear discomfort with a known history of bruxism is favored to be musculoskeletal/TMJ related as discussed.      Consider hearing aid consultation specifically for masking as discussed.  Consider using a white noise generator or janett on the phone including with the calm janett which may also benefit with guided meditation/mindfulness exercises.    TMJ care instructions provided as well as a referral to physical therapy.  This should be discussed further with a dental professional as per outlined instructions.      Annual audiogram. Return with any worsening of symptoms, failure to improve, or any other concerns for further evaluation and treatment.    ROUTINE EAR CARE    Keep the ears dry in general.  Water and soap dry the ears increases scaling by stripping away the natural oils of the ears.    A twisted single ply of facial tissue can be used to wick out moisture on the rare occasion when water gets stuck in the ear; or the water can be displaced with concentrated alcohol like OTC SwimEar or a few drops of 72-95% isopropyl alcohol to fill the ear canal.  Regular use will cause excess drying of the ears.    The ear should be kept moist in general with mineral oil. Three to four drops into the ear canal 2 to 3 times a week or even every night.    If the ears need to be irrigated use either a 50 50 mixture of white vinegar and distilled water or 50 50 mixture of alcohol and white vinegar.    Painful draining ears that do not resolve with conservative care could represent infection and may need microscopic office debridement/clearing of the wax, and topical antibiotic drops with or without steroids may need to be prescribed.      TMJ Syndrome / Sprain    This is a condition with chronic or recurrent pain in the joint of the jaw (in front of the ear). Just like all other joints in the body (knees, hips,  etc.) the jaw joint can be sprained or chronically injured over time. The jaw joint capsule can wear out just like other joints in the body.    The pain may cause limited motion of the jaw, a locking or catching sensation, clicking, popping, or grinding sounds from the joint with movement. It is a very common cause of headache, earache or neck pain. Other symptoms include ringing in the ear, and pressure/fullness of the ear.    The nerve that gives sensation to the jaw joint also gives sensation to the ear and part of the face. This is why pain in the face or ear can be coming from the jaw joint. It is sometimes caused by inflammation in the joint, injury or wear-and-tear of the cartilage in the joint, involuntary grinding of the teeth or poorly fitting dentures. Emotional stress and tension are often a factor. Most cases resolve completely within a few months with proper treatment.    Home Care:  1) Rest the jaw by avoiding crunchy or hard foods to chew. Do not eat hard or sticky candies. Soft foods and liquids are easier on the jaw. Protect your jaw while yawning.  2) Hot compress (small towel soaked in hot water or heating pad) applied to the jaw may give relief by reducing muscle spasm. Some people get relief with cold packs, so try both and see which one works best for you.  3) You may use ibuprofen (Motrin, Advil) to control pain, unless another medicine was prescribed.   If you weight between 130 and 150 lb, you may take 600 mg of Ibuprofen every 6 hours as needed for a few weeks, then less frequently following this. If you weight > 150 lb, you may take 800 mg every 6 hours for the same time period. Extended use of Ibuprofen is typically OK, but not every 6 hours (usually one or two times per day.)   [ NOTE : If you have chronic liver or kidney disease or ever had a stomach ulcer or GI bleeding, talk with your doctor before using these medicines.]  4) If you suspect emotional stress is related to your  "condition.  a) Try to identify the sources of stress in your life. It may not be obvious! These may include:  -- Daily hassles of life that pile up (traffic jams, missed appointments, car troubles)  -- Major life changes, both good (new baby, job promotion) and bad (loss of job, loss of loved one)  -- Overload: feeling that you have too many responsibilities and can't take care of everything at once  -- Helplessness: feeling like your problems are more than you can solve  b) When possible, do something about the source of your stress: avoid hassles, limit the amount of change that is happening in your life at one time and take a break when you feel overloaded.  c) Unfortunately, many stressful situations cannot be avoided. Therefore, it is necessary to learn HOW TO MANAGE STRESS better. There are many proven methods that work and will reduce your anxiety. These include simple things like exercise, good nutrition and adequate rest. Also, there are certain techniques that are helpful: relaxation and breathing exercises, visualization, biofeedback, meditation or simply taking some time-out to clear your mind. For more information about this, consult your doctor or go to a local bookstore and review the many books and tapes available on this subject.    Mouthguards for Grinding:  Some TMJ issues are worsened by nightly teeth grinding. This can create muscle tension and strain on the jaw joint. Most mouthguards protect the teeth and do NOT reduce the clenching. If the goal is to reduce clenching, I recommend trying an over the counter nightguard called "SmartGuard."  This can be purchased over-the-counter and is available through vendors such as [employer] and [employer]   This mouthguard fits only on the front teeth. As a result, it prevents your molars from contacting, preventing a forceful clench. This should be worn for brief period of time (weeks or months) as it can alter the bite with prolonged use. Most people will " notice improvement during this time.

## 2023-09-05 ENCOUNTER — TELEPHONE (OUTPATIENT)
Dept: OBSTETRICS AND GYNECOLOGY | Facility: CLINIC | Age: 78
End: 2023-09-05
Payer: MEDICARE

## 2023-09-05 NOTE — TELEPHONE ENCOUNTER
Returned call, pt states she would like to discuss a few issues with the dr. Pt scheduled for 9/28 at 1000. Patient verbalized understanding.

## 2023-09-05 NOTE — TELEPHONE ENCOUNTER
----- Message from Louise Schaefer sent at 9/5/2023  9:34 AM CDT -----  Regarding: appt for well check  Contact: pt  Type:  Sooner Apoointment Request    Caller is requesting a sooner appointment.  Caller declined first available appointment listed below.  Caller will not accept being placed on the waitlist and is requesting a message be sent to doctor.  Name of Caller:pt  When is the first available appointment?too far out    Would the patient rather a call back or a response via [facility]? Call back  Best Call Back Number:[phone]    Additional Information: sts she would like to get an appt for her annual well check some time in October --please advise  and thank you

## 2023-11-14 ENCOUNTER — LAB VISIT (OUTPATIENT)
Dept: LAB | Facility: HOSPITAL | Age: 78
End: 2023-11-14
Attending: NURSE PRACTITIONER
Payer: MEDICARE

## 2023-11-14 DIAGNOSIS — E78.2 MIXED DYSLIPIDEMIA: ICD-10-CM

## 2023-11-14 LAB
ALBUMIN SERPL BCP-MCNC: 4 G/DL (ref 3.5–5.2)
ALP SERPL-CCNC: 85 U/L (ref 55–135)
ALT SERPL W/O P-5'-P-CCNC: 17 U/L (ref 10–44)
ANION GAP SERPL CALC-SCNC: 10 MMOL/L (ref 8–16)
AST SERPL-CCNC: 16 U/L (ref 10–40)
BASOPHILS # BLD AUTO: 0.03 K/UL (ref 0–0.2)
BASOPHILS NFR BLD: 0.5 % (ref 0–1.9)
BILIRUB SERPL-MCNC: 0.3 MG/DL (ref 0.1–1)
BUN SERPL-MCNC: 11 MG/DL (ref 8–23)
CALCIUM SERPL-MCNC: 9.8 MG/DL (ref 8.7–10.5)
CHLORIDE SERPL-SCNC: 104 MMOL/L (ref 95–110)
CHOLEST SERPL-MCNC: 202 MG/DL (ref 120–199)
CHOLEST/HDLC SERPL: 3.7 {RATIO} (ref 2–5)
CO2 SERPL-SCNC: 27 MMOL/L (ref 23–29)
CREAT SERPL-MCNC: 0.6 MG/DL (ref 0.5–1.4)
DIFFERENTIAL METHOD: ABNORMAL
EOSINOPHIL # BLD AUTO: 0.1 K/UL (ref 0–0.5)
EOSINOPHIL NFR BLD: 2.1 % (ref 0–8)
ERYTHROCYTE [DISTWIDTH] IN BLOOD BY AUTOMATED COUNT: 12.8 % (ref 11.5–14.5)
EST. GFR  (NO RACE VARIABLE): >60 ML/MIN/1.73 M^2
ESTIMATED AVG GLUCOSE: 105 MG/DL (ref 68–131)
GLUCOSE SERPL-MCNC: 97 MG/DL (ref 70–110)
HBA1C MFR BLD: 5.3 % (ref 4–5.6)
HCT VFR BLD AUTO: 43.1 % (ref 37–48.5)
HDLC SERPL-MCNC: 55 MG/DL (ref 40–75)
HDLC SERPL: 27.2 % (ref 20–50)
HGB BLD-MCNC: 14.2 G/DL (ref 12–16)
IMM GRANULOCYTES # BLD AUTO: 0.01 K/UL (ref 0–0.04)
IMM GRANULOCYTES NFR BLD AUTO: 0.2 % (ref 0–0.5)
LDLC SERPL CALC-MCNC: 108.8 MG/DL (ref 63–159)
LYMPHOCYTES # BLD AUTO: 2.1 K/UL (ref 1–4.8)
LYMPHOCYTES NFR BLD: 33.2 % (ref 18–48)
MCH RBC QN AUTO: 31.3 PG (ref 27–31)
MCHC RBC AUTO-ENTMCNC: 32.9 G/DL (ref 32–36)
MCV RBC AUTO: 95 FL (ref 82–98)
MONOCYTES # BLD AUTO: 0.5 K/UL (ref 0.3–1)
MONOCYTES NFR BLD: 8.5 % (ref 4–15)
NEUTROPHILS # BLD AUTO: 3.5 K/UL (ref 1.8–7.7)
NEUTROPHILS NFR BLD: 55.5 % (ref 38–73)
NONHDLC SERPL-MCNC: 147 MG/DL
NRBC BLD-RTO: 0 /100 WBC
PLATELET # BLD AUTO: 344 K/UL (ref 150–450)
PMV BLD AUTO: 10 FL (ref 9.2–12.9)
POTASSIUM SERPL-SCNC: 4.2 MMOL/L (ref 3.5–5.1)
PROT SERPL-MCNC: 7.2 G/DL (ref 6–8.4)
RBC # BLD AUTO: 4.53 M/UL (ref 4–5.4)
SODIUM SERPL-SCNC: 141 MMOL/L (ref 136–145)
TRIGL SERPL-MCNC: 191 MG/DL (ref 30–150)
WBC # BLD AUTO: 6.32 K/UL (ref 3.9–12.7)

## 2023-11-14 PROCEDURE — 84443 ASSAY THYROID STIM HORMONE: CPT | Performed by: NURSE PRACTITIONER

## 2023-11-14 PROCEDURE — 36415 COLL VENOUS BLD VENIPUNCTURE: CPT | Mod: PO | Performed by: NURSE PRACTITIONER

## 2023-11-14 PROCEDURE — 80053 COMPREHEN METABOLIC PANEL: CPT | Performed by: NURSE PRACTITIONER

## 2023-11-14 PROCEDURE — 83036 HEMOGLOBIN GLYCOSYLATED A1C: CPT | Performed by: NURSE PRACTITIONER

## 2023-11-14 PROCEDURE — 80061 LIPID PANEL: CPT | Performed by: NURSE PRACTITIONER

## 2023-11-14 PROCEDURE — 85025 COMPLETE CBC W/AUTO DIFF WBC: CPT | Performed by: NURSE PRACTITIONER

## 2023-11-15 LAB — TSH SERPL DL<=0.005 MIU/L-ACNC: 1.47 UIU/ML (ref 0.4–4)

## 2023-11-16 ENCOUNTER — HOSPITAL ENCOUNTER (OUTPATIENT)
Dept: RADIOLOGY | Facility: HOSPITAL | Age: 78
Discharge: HOME | End: 2023-11-16
Attending: INTERNAL MEDICINE
Payer: MEDICARE

## 2023-11-16 DIAGNOSIS — Z79.811 LONG TERM (CURRENT) USE OF AROMATASE INHIBITORS: ICD-10-CM

## 2023-11-16 DIAGNOSIS — C50.411 MALIGNANT NEOPLASM OF UPPER-OUTER QUADRANT OF RIGHT BREAST IN FEMALE, ESTROGEN RECEPTOR POSITIVE: ICD-10-CM

## 2023-11-16 DIAGNOSIS — Z17.0 MALIGNANT NEOPLASM OF UPPER-OUTER QUADRANT OF RIGHT BREAST IN FEMALE, ESTROGEN RECEPTOR POSITIVE: ICD-10-CM

## 2023-11-16 PROCEDURE — 77080 DXA BONE DENSITY AXIAL: CPT | Mod: TC,PO

## 2023-11-17 ENCOUNTER — TELEPHONE (OUTPATIENT)
Dept: FAMILY MEDICINE | Facility: CLINIC | Age: 78
End: 2023-11-17
Payer: MEDICARE

## 2023-11-22 ENCOUNTER — LAB VISIT (OUTPATIENT)
Dept: LAB | Facility: HOSPITAL | Age: 78
End: 2023-11-22
Attending: INTERNAL MEDICINE
Payer: MEDICARE

## 2023-11-22 DIAGNOSIS — I42.0 DILATED CARDIOMYOPATHY SECONDARY TO MALIGNANCY: ICD-10-CM

## 2023-11-22 DIAGNOSIS — Z85.3 PERSONAL HISTORY OF MALIGNANT NEOPLASM OF BREAST: Primary | ICD-10-CM

## 2023-11-22 DIAGNOSIS — M65.9 SAPHO SYNDROME: ICD-10-CM

## 2023-11-22 DIAGNOSIS — I82.439: ICD-10-CM

## 2023-11-22 DIAGNOSIS — C80.1 DILATED CARDIOMYOPATHY SECONDARY TO MALIGNANCY: ICD-10-CM

## 2023-11-22 DIAGNOSIS — I82.419: ICD-10-CM

## 2023-11-22 DIAGNOSIS — C50.411 MALIGNANT NEOPLASM OF UPPER-OUTER QUADRANT OF RIGHT FEMALE BREAST: ICD-10-CM

## 2023-11-22 DIAGNOSIS — L70.9 SAPHO SYNDROME: ICD-10-CM

## 2023-11-22 DIAGNOSIS — L40.3 SAPHO SYNDROME: ICD-10-CM

## 2023-11-22 DIAGNOSIS — M86.9 SAPHO SYNDROME: ICD-10-CM

## 2023-11-22 DIAGNOSIS — M85.80 SAPHO SYNDROME: ICD-10-CM

## 2023-11-22 LAB
ALBUMIN SERPL BCP-MCNC: 4.5 G/DL (ref 3.5–5.2)
ALP SERPL-CCNC: 83 U/L (ref 55–135)
ALT SERPL W/O P-5'-P-CCNC: 13 U/L (ref 10–44)
ANION GAP SERPL CALC-SCNC: 10 MMOL/L (ref 8–16)
AST SERPL-CCNC: 15 U/L (ref 10–40)
BASOPHILS # BLD AUTO: 0.04 K/UL (ref 0–0.2)
BASOPHILS NFR BLD: 0.6 % (ref 0–1.9)
BILIRUB SERPL-MCNC: 0.4 MG/DL (ref 0.1–1)
BUN SERPL-MCNC: 11 MG/DL (ref 8–23)
CALCIUM SERPL-MCNC: 9.9 MG/DL (ref 8.7–10.5)
CEA SERPL-MCNC: 0.9 NG/ML (ref 0–5)
CHLORIDE SERPL-SCNC: 103 MMOL/L (ref 95–110)
CO2 SERPL-SCNC: 25 MMOL/L (ref 23–29)
CREAT SERPL-MCNC: 0.5 MG/DL (ref 0.5–1.4)
DIFFERENTIAL METHOD BLD: ABNORMAL
EOSINOPHIL # BLD AUTO: 0.1 K/UL (ref 0–0.5)
EOSINOPHIL NFR BLD: 1.4 % (ref 0–8)
ERYTHROCYTE [DISTWIDTH] IN BLOOD BY AUTOMATED COUNT: 12.6 % (ref 11.5–14.5)
EST. GFR  (NO RACE VARIABLE): >60 ML/MIN/1.73 M^2
GLUCOSE SERPL-MCNC: 92 MG/DL (ref 70–110)
HCT VFR BLD AUTO: 42.4 % (ref 37–48.5)
HGB BLD-MCNC: 13.8 G/DL (ref 12–16)
IMM GRANULOCYTES # BLD AUTO: 0.02 K/UL (ref 0–0.04)
IMM GRANULOCYTES NFR BLD AUTO: 0.3 % (ref 0–0.5)
LYMPHOCYTES # BLD AUTO: 1.9 K/UL (ref 1–4.8)
LYMPHOCYTES NFR BLD: 26.5 % (ref 18–48)
MCH RBC QN AUTO: 30.9 PG (ref 27–31)
MCHC RBC AUTO-ENTMCNC: 32.5 G/DL (ref 32–36)
MCV RBC AUTO: 95 FL (ref 82–98)
MONOCYTES # BLD AUTO: 0.7 K/UL (ref 0.3–1)
MONOCYTES NFR BLD: 9.1 % (ref 4–15)
NEUTROPHILS # BLD AUTO: 4.5 K/UL (ref 1.8–7.7)
NEUTROPHILS NFR BLD: 62.1 % (ref 38–73)
NRBC BLD-RTO: 0 /100 WBC
PLATELET # BLD AUTO: 320 K/UL (ref 150–450)
PMV BLD AUTO: 9 FL (ref 9.2–12.9)
POTASSIUM SERPL-SCNC: 4 MMOL/L (ref 3.5–5.1)
PROT SERPL-MCNC: 7.3 G/DL (ref 6–8.4)
RBC # BLD AUTO: 4.47 M/UL (ref 4–5.4)
SODIUM SERPL-SCNC: 138 MMOL/L (ref 136–145)
WBC # BLD AUTO: 7.27 K/UL (ref 3.9–12.7)

## 2023-11-22 PROCEDURE — 82378 CARCINOEMBRYONIC ANTIGEN: CPT | Performed by: INTERNAL MEDICINE

## 2023-11-22 PROCEDURE — 86300 IMMUNOASSAY TUMOR CA 15-3: CPT | Mod: 91 | Performed by: INTERNAL MEDICINE

## 2023-11-22 PROCEDURE — 86300 IMMUNOASSAY TUMOR CA 15-3: CPT | Performed by: INTERNAL MEDICINE

## 2023-11-22 PROCEDURE — 85025 COMPLETE CBC W/AUTO DIFF WBC: CPT | Performed by: INTERNAL MEDICINE

## 2023-11-22 PROCEDURE — 80053 COMPREHEN METABOLIC PANEL: CPT | Performed by: INTERNAL MEDICINE

## 2023-11-22 PROCEDURE — 36415 COLL VENOUS BLD VENIPUNCTURE: CPT | Performed by: INTERNAL MEDICINE

## 2023-11-25 LAB
CANCER AG15-3 SERPL-ACNC: 23 U/ML (ref 0–25)
CANCER AG27-29 SERPL-ACNC: 26.4 U/ML (ref 0–38.6)

## 2023-11-27 ENCOUNTER — OFFICE VISIT (OUTPATIENT)
Dept: FAMILY MEDICINE | Facility: CLINIC | Age: 78
End: 2023-11-27
Payer: MEDICARE

## 2023-11-27 VITALS
OXYGEN SATURATION: 95 % | DIASTOLIC BLOOD PRESSURE: 80 MMHG | HEART RATE: 73 BPM | BODY MASS INDEX: 23.71 KG/M2 | WEIGHT: 138.88 LBS | HEIGHT: 64 IN | SYSTOLIC BLOOD PRESSURE: 118 MMHG | TEMPERATURE: 99 F

## 2023-11-27 DIAGNOSIS — E78.49 OTHER HYPERLIPIDEMIA: Primary | ICD-10-CM

## 2023-11-27 PROCEDURE — 1101F PT FALLS ASSESS-DOCD LE1/YR: CPT | Mod: CPTII,S$GLB,, | Performed by: FAMILY MEDICINE

## 2023-11-27 PROCEDURE — 99999 PR PBB SHADOW E&M-EST. PATIENT-LVL IV: ICD-10-PCS | Mod: PBBFAC,,, | Performed by: FAMILY MEDICINE

## 2023-11-27 PROCEDURE — 1159F PR MEDICATION LIST DOCUMENTED IN MEDICAL RECORD: ICD-10-PCS | Mod: CPTII,S$GLB,, | Performed by: FAMILY MEDICINE

## 2023-11-27 PROCEDURE — 99999 PR PBB SHADOW E&M-EST. PATIENT-LVL IV: CPT | Mod: PBBFAC,,, | Performed by: FAMILY MEDICINE

## 2023-11-27 PROCEDURE — 3074F SYST BP LT 130 MM HG: CPT | Mod: CPTII,S$GLB,, | Performed by: FAMILY MEDICINE

## 2023-11-27 PROCEDURE — 1160F RVW MEDS BY RX/DR IN RCRD: CPT | Mod: CPTII,S$GLB,, | Performed by: FAMILY MEDICINE

## 2023-11-27 PROCEDURE — 1101F PR PT FALLS ASSESS DOC 0-1 FALLS W/OUT INJ PAST YR: ICD-10-PCS | Mod: CPTII,S$GLB,, | Performed by: FAMILY MEDICINE

## 2023-11-27 PROCEDURE — 3079F PR MOST RECENT DIASTOLIC BLOOD PRESSURE 80-89 MM HG: ICD-10-PCS | Mod: CPTII,S$GLB,, | Performed by: FAMILY MEDICINE

## 2023-11-27 PROCEDURE — 3288F FALL RISK ASSESSMENT DOCD: CPT | Mod: CPTII,S$GLB,, | Performed by: FAMILY MEDICINE

## 2023-11-27 PROCEDURE — 99213 PR OFFICE/OUTPT VISIT, EST, LEVL III, 20-29 MIN: ICD-10-PCS | Mod: S$GLB,,, | Performed by: FAMILY MEDICINE

## 2023-11-27 PROCEDURE — 1159F MED LIST DOCD IN RCRD: CPT | Mod: CPTII,S$GLB,, | Performed by: FAMILY MEDICINE

## 2023-11-27 PROCEDURE — 3074F PR MOST RECENT SYSTOLIC BLOOD PRESSURE < 130 MM HG: ICD-10-PCS | Mod: CPTII,S$GLB,, | Performed by: FAMILY MEDICINE

## 2023-11-27 PROCEDURE — 1160F PR REVIEW ALL MEDS BY PRESCRIBER/CLIN PHARMACIST DOCUMENTED: ICD-10-PCS | Mod: CPTII,S$GLB,, | Performed by: FAMILY MEDICINE

## 2023-11-27 PROCEDURE — 99213 OFFICE O/P EST LOW 20 MIN: CPT | Mod: S$GLB,,, | Performed by: FAMILY MEDICINE

## 2023-11-27 PROCEDURE — 3079F DIAST BP 80-89 MM HG: CPT | Mod: CPTII,S$GLB,, | Performed by: FAMILY MEDICINE

## 2023-11-27 PROCEDURE — 1126F AMNT PAIN NOTED NONE PRSNT: CPT | Mod: CPTII,S$GLB,, | Performed by: FAMILY MEDICINE

## 2023-11-27 PROCEDURE — 3288F PR FALLS RISK ASSESSMENT DOCUMENTED: ICD-10-PCS | Mod: CPTII,S$GLB,, | Performed by: FAMILY MEDICINE

## 2023-11-27 PROCEDURE — 1126F PR PAIN SEVERITY QUANTIFIED, NO PAIN PRESENT: ICD-10-PCS | Mod: CPTII,S$GLB,, | Performed by: FAMILY MEDICINE

## 2023-11-27 NOTE — PATIENT INSTRUCTIONS
DASH diet for Hypertension and Healthy Eating  Provided by the [facility]    Healthy Lifestyle   Nutrition and healthy eating  The DASH diet emphasizes portion size, eating a variety of foods and getting the right amount of nutrients. Discover how DASH can improve your health and lower your blood pressure.  By [facility] Staff   DASH stands for Dietary Approaches to Stop Hypertension. The DASH diet is a lifelong approach to healthy eating that's designed to help treat or prevent high blood pressure (hypertension). The DASH diet encourages you to reduce the sodium in your diet and eat a variety of foods rich in nutrients that help lower blood pressure, such as potassium, calcium and magnesium.  By following the DASH diet, you may be able to reduce your blood pressure by a few points in just two weeks. Over time, your systolic blood pressure could drop by eight to 14 points, which can make a significant difference in your health risks.  Because the DASH diet is a healthy way of eating, it offers health benefits besides just lowering blood pressure. The DASH diet is also in line with dietary recommendations to prevent osteoporosis, cancer, heart disease, stroke and diabetes.  The DASH diet emphasizes vegetables, fruits and low-fat dairy foods -- and moderate amounts of whole grains, fish, poultry and nuts.  In addition to the standard DASH diet, there is also a lower sodium version of the diet. You can choose the version of the diet that meets your health needs:  Standard DASH diet. You can consume up to 2,300 milligrams (mg) of sodium a day.   Lower sodium DASH diet. You can consume up to 1,500 mg of sodium a day.  Both versions of the DASH diet aim to reduce the amount of sodium in your diet compared with what you might get in a typical American diet, which can amount to a whopping 3,400 mg of sodium a day or more.  The standard DASH diet meets the recommendation from the Dietary Guidelines for Americans to keep  "daily sodium intake to less than 2,300 mg a day.  The American Heart Association recommends 1,500 mg a day of sodium as an upper limit for all adults. If you aren't sure what sodium level is right for you, talk to your doctor.  Both versions of the DASH diet include lots of whole grains, fruits, vegetables and low-fat dairy products. The DASH diet also includes some fish, poultry and legumes, and encourages a small amount of nuts and seeds a few times a week.   You can eat red meat, sweets and fats in small amounts. The DASH diet is low in saturated fat, cholesterol and total fat.  Here's a look at the recommended servings from each food group for the 2,773-[id number]-[id number]-day DASH diet.  Grains: 6 to 8 servings a day  Grains include bread, cereal, rice and pasta. Examples of one serving of grains include 1 slice whole-wheat bread, 1 ounce dry cereal, or 1/2 cup cooked cereal, rice or pasta.  Focus on whole grains because they have more fiber and nutrients than do refined grains. For instance, use brown rice instead of white rice, whole-wheat pasta instead of regular pasta and whole-grain bread instead of white bread. Look for products labeled "100 percent whole grain" or "100 percent whole wheat."   Grains are naturally low in fat. Keep them this way by avoiding butter, cream and cheese sauces.  Vegetables: 4 to 5 servings a day  Tomatoes, carrots, broccoli, sweet potatoes, greens and other vegetables are full of fiber, vitamins, and such minerals as potassium and magnesium. Examples of one serving include 1 cup raw leafy green vegetables or 1/2 cup cut-up raw or cooked vegetables.  Don't think of vegetables only as side dishes -- a hearty blend of vegetables served over brown rice or whole-wheat noodles can serve as the main dish for a meal.   Fresh and frozen vegetables are both good choices. When buying frozen and canned vegetables, choose those labeled as low sodium or without added salt.   To increase the number of " servings you fit in daily, be creative. In a stir-jreome, for instance, cut the amount of meat in half and double up on the vegetables.  Fruits: 4 to 5 servings a day  Many fruits need little preparation to become a healthy part of a meal or snack. Like vegetables, they're packed with fiber, potassium and magnesium and are typically low in fat -- coconuts are an exception. Examples of one serving include one medium fruit, 1/2 cup fresh, frozen or canned fruit, or 4 ounces of juice.  Have a piece of fruit with meals and one as a snack, then round out your day with a dessert of fresh fruits topped with a dollop of low-fat yogurt.   Leave on edible peels whenever possible. The peels of apples, pears and most fruits with pits add interesting texture to recipes and contain healthy nutrients and fiber.   Remember that citrus fruits and juices, such as grapefruit, can interact with certain medications, so check with your doctor or pharmacist to see if they're OK for you.   If you choose canned fruit or juice, make sure no sugar is added.  Dairy: 2 to 3 servings a day  Milk, yogurt, cheese and other dairy products are major sources of calcium, vitamin D and protein. But the key is to make sure that you choose dairy products that are low fat or fat-free because otherwise they can be a major source of fat -- and most of it is saturated. Examples of one serving include 1 cup skim or 1 percent milk, 1 cup low fat yogurt, or 1 1/2 ounces part-skim cheese.  Low-fat or fat-free frozen yogurt can help you boost the amount of dairy products you eat while offering a sweet treat. Add fruit for a healthy twist.   If you have trouble digesting dairy products, choose lactose-free products or consider taking an over-the-counter product that contains the enzyme lactase, which can reduce or prevent the symptoms of lactose intolerance.   Go easy on regular and even fat-free cheeses because they are typically high in sodium.  Lean meat, poultry  and fish: 6 servings or fewer a day  Meat can be a rich source of protein, B vitamins, iron and zinc. Choose lean varieties and aim for no more than 6 ounces a day. Cutting back on your meat portion will allow room for more vegetables.  Trim away skin and fat from poultry and meat and then bake, broil, grill or roast instead of frying in fat.   Eat heart-healthy fish, such as salmon, herring and tuna. These types of fish are high in omega-3 fatty acids, which can help lower your total cholesterol.  Nuts, seeds and legumes: 4 to 5 servings a week  Almonds, sunflower seeds, kidney beans, peas, lentils and other foods in this family are good sources of magnesium, potassium and protein. They're also full of fiber and phytochemicals, which are plant compounds that may protect against some cancers and cardiovascular disease.  Serving sizes are small and are intended to be consumed only a few times a week because these foods are high in calories. Examples of one serving include 1/3 cup nuts, 2 tablespoons seeds, or 1/2 cup cooked beans or peas.   Nuts sometimes get a bad rap because of their fat content, but they contain healthy types of fat -- monounsaturated fat and omega-3 fatty acids. They're high in calories, however, so eat them in moderation. Try adding them to stir-fries, salads or cereals.   Soybean-based products, such as tofu and tempeh, can be a good alternative to meat because they contain all of the amino acids your body needs to make a complete protein, just like meat.  Fats and oils: 2 to 3 servings a day  Fat helps your body absorb essential vitamins and helps your body's immune system. But too much fat increases your risk of heart disease, diabetes and obesity. The DASH diet strives for a healthy balance by limiting total fat to less than 30 percent of daily calories from fat, with a focus on the healthier monounsaturated fats.  Examples of one serving include 1 teaspoon soft margarine, 1 tablespoon  mayonnaise or 2 tablespoons salad dressing.  Saturated fat and trans fat are the main dietary culprits in increasing your risk of coronary artery disease. DASH helps keep your daily saturated fat to less than 6 percent of your total calories by limiting use of meat, butter, cheese, whole milk, cream and eggs in your diet, along with foods made from lard, solid shortenings, and palm and coconut oils.   Avoid trans fat, commonly found in such processed foods as crackers, baked goods and fried items.   Read food labels on margarine and salad dressing so that you can choose those that are lowest in saturated fat and free of trans fat.  Sweets: 5 servings or fewer a week  You don't have to banish sweets entirely while following the DASH diet -- just go easy on them. Examples of one serving include 1 tablespoon sugar, jelly or jam, 1/2 cup sorbet, or 1 cup lemonade.  When you eat sweets, choose those that are fat-free or low-fat, such as sorbets, fruit ices, jelly beans, hard candy, freda crackers or low-fat cookies.   Artificial sweeteners such as aspartame (NutraSweet, Equal) and sucralose (Splenda) may help satisfy your sweet tooth while sparing the sugar. But remember that you still must use them sensibly. It's OK to swap a diet cola for a regular cola, but not in place of a more nutritious beverage such as low-fat milk or even plain water.   Cut back on added sugar, which has no nutritional value but can pack on calories.  Drinking too much alcohol can increase blood pressure. The Dietary Guidelines for Americans recommends that men limit alcohol to no more than two drinks a day and women to one or less.  The DASH diet doesn't address caffeine consumption. The influence of caffeine on blood pressure remains unclear. But caffeine can cause your blood pressure to rise at least temporarily. If you already have high blood pressure or if you think caffeine is affecting your blood pressure, talk to your doctor about your  "caffeine consumption.  While the DASH diet is not a weight-loss program, you may indeed lose unwanted pounds because it can help guide you toward healthier food choices.  The DASH diet generally includes about 2,000 calories a day. If you're trying to lose weight, you may need to eat fewer calories. You may also need to adjust your serving goals based on your individual circumstances -- something your health care team can help you decide.  The foods at the core of the DASH diet are naturally low in sodium. So just by following the DASH diet, you're likely to reduce your sodium intake. You also reduce sodium further by:  Using sodium-free spices or flavorings with your food instead of salt   Not adding salt when cooking rice, pasta or hot cereal   Rinsing canned foods to remove some of the sodium   Buying foods labeled "no salt added," "sodium-free," "low sodium" or "very low sodium"  One teaspoon of table salt has 2,325 mg of sodium. When you read food labels, you may be surprised at just how much sodium some processed foods contain. Even low-fat soups, canned vegetables, ready-to-eat cereals and sliced turkey from the local deli -- foods you may have considered healthy -- often have lots of sodium.  You may notice a difference in taste when you choose low-sodium food and beverages. If things seem too bland, gradually introduce low-sodium foods and cut back on table salt until you reach your sodium goal. That'll give your palate time to adjust.  Using salt-free seasoning blends or herbs and spices may also ease the transition. It can take several weeks for your taste buds to get used to less salty foods.  Try these strategies to get started on the DASH diet:   Change gradually. If you now eat only one or two servings of fruits or vegetables a day, try to add a serving at lunch and one at dinner. Rather than switching to all whole grains, start by making one or two of your grain servings whole grains. Increasing " fruits, vegetables and whole grains gradually can also help prevent bloating or diarrhea that may occur if you aren't used to eating a diet with lots of fiber. You can also try over-the-counter products to help reduce gas from beans and vegetables.   Reward successes and forgive slip-ups. Reward yourself with a nonfood treat for your accomplishments -- rent a movie, purchase a book or get together with a friend. Everyone slips, especially when learning something new. Remember that changing your lifestyle is a long-term process. Find out what triggered your setback and then just  where you left off with the DASH diet.   Add physical activity. To boost your blood pressure lowering efforts even more, consider increasing your physical activity in addition to following the DASH diet. Combining both the DASH diet and physical activity makes it more likely that you'll reduce your blood pressure.   Get support if you need it. If you're having trouble sticking to your diet, talk to your doctor or dietitian about it. You might get some tips that will help you stick to the DASH diet.  Remember, healthy eating isn't an all-or-nothing proposition. What's most important is that, on average, you eat healthier foods with plenty of variety -- both to keep your diet nutritious and to avoid boredom or extremes. And with the DASH diet, you can have both.

## 2023-11-27 NOTE — PROGRESS NOTES
Subjective:       Patient ID: Liliana Hairston is a 78 y.o. female.    Chief Complaint: No chief complaint on file.    .      Annual exam  Past Medical History:  No date: Breast cancer  No date: Cancer      Comment:  breast, right '97  No date: Cataract  No date: Headache        Review of Systems   Constitutional:  Negative for fatigue and unexpected weight change.   HENT:  Positive for nosebleeds.    Respiratory:  Negative for chest tightness and shortness of breath.    Cardiovascular:  Negative for chest pain.   Gastrointestinal:  Negative for abdominal pain.   Genitourinary:  Negative for menstrual problem.   Allergic/Immunologic: Negative for food allergies.   Psychiatric/Behavioral:  Negative for dysphoric mood.      Patient Active Problem List   Diagnosis    Cancer    Primary osteoarthritis of right knee    Bilateral sensorineural hearing loss    Tinnitus of both ears    Diverticulosis of large intestine without hemorrhage    History of breast cancer    Acute midline low back pain without sciatica    Other hyperlipidemia    Intractable episodic cluster headache    Cough    Decreased range of motion of intervertebral discs of cervical spine    Tenderness    Decreased strength    Long term (current) use of aromatase inhibitors    Malignant neoplasm of upper-outer quadrant of right breast in female, estrogen receptor positive       Objective:      Physical Exam  Vitals and nursing note reviewed.   Constitutional:       Appearance: She is well-developed.   Neck:      Thyroid: No thyromegaly.   Cardiovascular:      Rate and Rhythm: Normal rate and regular rhythm.      Heart sounds: Normal heart sounds.   Pulmonary:      Effort: Pulmonary effort is normal.      Breath sounds: Normal breath sounds.   Chest:   Breasts:     Breasts are symmetrical.      Right: No inverted nipple, mass, nipple discharge, skin change or tenderness.      Left: No inverted nipple, mass, nipple discharge, skin change or  tenderness.   Abdominal:      General: Bowel sounds are normal. There is no distension.      Palpations: Abdomen is soft.      Tenderness: There is no abdominal tenderness.   Genitourinary:     Exam position: Supine.      Labia:         Right: No rash, tenderness, lesion or injury.         Left: No rash, tenderness, lesion or injury.       Vagina: Normal. No vaginal discharge.      Cervix: No cervical motion tenderness, discharge or friability.      Adnexa:         Right: No mass, tenderness or fullness.          Left: No mass, tenderness or fullness.     Skin:     General: Skin is warm and dry.   Neurological:      Mental Status: She is alert and oriented to person, place, and time.       Lab Results   Component Value Date    WBC 7.27 11/22/2023    HGB 13.8 11/22/2023    HCT 42.4 11/22/2023     11/22/2023    CHOL 202 (H) 11/14/2023    TRIG 191 (H) 11/14/2023    HDL 55 11/14/2023    ALT 13 11/22/2023    AST 15 11/22/2023     11/22/2023    K 4.0 11/22/2023     11/22/2023    CREATININE 0.5 11/22/2023    BUN 11 11/22/2023    CO2 25 11/22/2023    TSH 1.467 11/14/2023    HGBA1C 5.3 11/14/2023     The 10-year ASCVD risk score (Carl FRAZIER, et al., 2019) is: 17.1%    Values used to calculate the score:      Age: 78 years      Sex: Female      Is Non- : No      Diabetic: No      Tobacco smoker: No      Systolic Blood Pressure: 118 mmHg      Is BP treated: No      HDL Cholesterol: 55 mg/dL      Total Cholesterol: 202 mg/dL    Assessment:       1. Other hyperlipidemia          Plan:       Annual physical exam    Epistaxis  -     oxymetazoline (AFRIN, OXYMETAZOLINE,) 0.05 % nasal spray; 2 spray tid x 3 days for severe congestion or bleeding  Dispense: 30 mL; Refill: 2  Stable and controlled. Continue current treatment plan as previously prescribed with your PCP.     Colon cancer screening  -     Fecal Immunochemical Test (iFOBT); Future; Expected date: 12/09/2022      Patient readiness:  acceptance and barriers:none    During the course of the visit the patient was educated and counseled about the following:     Activity, immunization and Social interavtions encourage.    Goals Achived    Did patient meet goals/outcomes: Yes    The following self management tools provided: excercise log    Patient Instructions (the written plan) was given to the patient/family.     Time spent with patient: 30 minutes    Barriers to medications present (no )    Adverse reactions to current medications (no)    Over the counter medications reviewed (Yes)        30-minute visit. 10 minutes spent counseling patient on diet, exercise, and weight loss.  This has been fully explained to the patient, who indicates understanding.      Discussed health maintenance guidelines appropriate for age.  Discussed health maintenance guidelines appropriate for age.

## 2023-11-30 ENCOUNTER — OFFICE VISIT (OUTPATIENT)
Dept: OBSTETRICS AND GYNECOLOGY | Facility: CLINIC | Age: 78
End: 2023-11-30
Payer: MEDICARE

## 2023-11-30 VITALS
HEIGHT: 64 IN | RESPIRATION RATE: 18 BRPM | DIASTOLIC BLOOD PRESSURE: 70 MMHG | SYSTOLIC BLOOD PRESSURE: 118 MMHG | WEIGHT: 139.56 LBS | BODY MASS INDEX: 23.82 KG/M2

## 2023-11-30 DIAGNOSIS — C50.911 BREAST CANCER, STAGE 1, ESTROGEN RECEPTOR POSITIVE, RIGHT: ICD-10-CM

## 2023-11-30 DIAGNOSIS — N89.8 VAGINAL DRYNESS: ICD-10-CM

## 2023-11-30 DIAGNOSIS — Z01.419 ROUTINE GYNECOLOGICAL EXAMINATION: Primary | ICD-10-CM

## 2023-11-30 DIAGNOSIS — Z90.11 S/P MASTECTOMY, RIGHT: ICD-10-CM

## 2023-11-30 DIAGNOSIS — Z90.710 S/P HYSTERECTOMY: ICD-10-CM

## 2023-11-30 DIAGNOSIS — Z17.0 BREAST CANCER, STAGE 1, ESTROGEN RECEPTOR POSITIVE, RIGHT: ICD-10-CM

## 2023-11-30 PROCEDURE — 99999 PR PBB SHADOW E&M-EST. PATIENT-LVL IV: CPT | Mod: PBBFAC,,, | Performed by: OBSTETRICS & GYNECOLOGY

## 2023-11-30 PROCEDURE — G0101 PR CA SCREEN;PELVIC/BREAST EXAM: ICD-10-PCS | Mod: S$GLB,,, | Performed by: OBSTETRICS & GYNECOLOGY

## 2023-11-30 PROCEDURE — 1126F PR PAIN SEVERITY QUANTIFIED, NO PAIN PRESENT: ICD-10-PCS | Mod: CPTII,S$GLB,, | Performed by: OBSTETRICS & GYNECOLOGY

## 2023-11-30 PROCEDURE — 3078F PR MOST RECENT DIASTOLIC BLOOD PRESSURE < 80 MM HG: ICD-10-PCS | Mod: CPTII,S$GLB,, | Performed by: OBSTETRICS & GYNECOLOGY

## 2023-11-30 PROCEDURE — 1159F MED LIST DOCD IN RCRD: CPT | Mod: CPTII,S$GLB,, | Performed by: OBSTETRICS & GYNECOLOGY

## 2023-11-30 PROCEDURE — 3078F DIAST BP <80 MM HG: CPT | Mod: CPTII,S$GLB,, | Performed by: OBSTETRICS & GYNECOLOGY

## 2023-11-30 PROCEDURE — 3288F PR FALLS RISK ASSESSMENT DOCUMENTED: ICD-10-PCS | Mod: CPTII,S$GLB,, | Performed by: OBSTETRICS & GYNECOLOGY

## 2023-11-30 PROCEDURE — 1159F PR MEDICATION LIST DOCUMENTED IN MEDICAL RECORD: ICD-10-PCS | Mod: CPTII,S$GLB,, | Performed by: OBSTETRICS & GYNECOLOGY

## 2023-11-30 PROCEDURE — 3074F SYST BP LT 130 MM HG: CPT | Mod: CPTII,S$GLB,, | Performed by: OBSTETRICS & GYNECOLOGY

## 2023-11-30 PROCEDURE — 1101F PT FALLS ASSESS-DOCD LE1/YR: CPT | Mod: CPTII,S$GLB,, | Performed by: OBSTETRICS & GYNECOLOGY

## 2023-11-30 PROCEDURE — G0101 CA SCREEN;PELVIC/BREAST EXAM: HCPCS | Mod: S$GLB,,, | Performed by: OBSTETRICS & GYNECOLOGY

## 2023-11-30 PROCEDURE — 1126F AMNT PAIN NOTED NONE PRSNT: CPT | Mod: CPTII,S$GLB,, | Performed by: OBSTETRICS & GYNECOLOGY

## 2023-11-30 PROCEDURE — 99999 PR PBB SHADOW E&M-EST. PATIENT-LVL IV: ICD-10-PCS | Mod: PBBFAC,,, | Performed by: OBSTETRICS & GYNECOLOGY

## 2023-11-30 PROCEDURE — 1101F PR PT FALLS ASSESS DOC 0-1 FALLS W/OUT INJ PAST YR: ICD-10-PCS | Mod: CPTII,S$GLB,, | Performed by: OBSTETRICS & GYNECOLOGY

## 2023-11-30 PROCEDURE — 3074F PR MOST RECENT SYSTOLIC BLOOD PRESSURE < 130 MM HG: ICD-10-PCS | Mod: CPTII,S$GLB,, | Performed by: OBSTETRICS & GYNECOLOGY

## 2023-11-30 PROCEDURE — 3288F FALL RISK ASSESSMENT DOCD: CPT | Mod: CPTII,S$GLB,, | Performed by: OBSTETRICS & GYNECOLOGY

## 2023-11-30 NOTE — PROGRESS NOTES
Chief Complaint   Patient presents with    Vaginal Atrophy    Annual Exam     History of Present Illness: Liliana Hairston is a 78 y.o. female that presents today 11/30/2023 for well gyn visit.    Past Medical History:   Diagnosis Date    Breast cancer     Cancer     breast, right '97    Cataract     Headache        Past Surgical History:   Procedure Laterality Date    APPENDECTOMY      BREAST SURGERY  1997    right mastectomy    COLONOSCOPY N/A 2/21/2018    Procedure: COLONOSCOPY;  Surgeon: Cristel Fritz MD;  Location: [facility];  Service: Endoscopy;  Laterality: N/A;    EYE SURGERY      HYSTERECTOMY      LUNG SURGERY  1998    remove cyst on top of right lobe    right mastectomy  2015       Outpatient Medications Prior to Visit   Medication Sig Dispense Refill    (Magic mouthwash) 1:1:1 diphenhydrAMINE(Benadryl) 12.5mg/5ml liq, aluminum & magnesium hydroxide-simethicone (Maalox), LIDOcaine viscous 2% Swish and spit 5 mLs 4 (four) times daily as needed (sore throat). for mouth sores 360 mL 0    albuterol (PROVENTIL/VENTOLIN HFA) 90 mcg/actuation inhaler Inhale 2 puffs into the lungs 4 (four) times daily. 18 g 0    anastrozole (ARIMIDEX) 1 mg Tab Take 1 tablet (1 mg total) by mouth once daily. 90 tablet 3    azelastine (ASTELIN) 137 mcg (0.1 %) nasal spray 1 spray (137 mcg total) by Nasal route 2 (two) times daily as needed for Rhinitis. 30 mL 4    CALCIUM CARBONATE/VITAMIN D3 (CALCIUM 500 + D ORAL) Take 1 each by mouth daily as needed.       dicyclomine (BENTYL) 20 mg tablet Take 1 tablet (20 mg total) by mouth 3 (three) times daily as needed (stomache). 90 tablet 1    doxycycline (MONODOX) 100 MG capsule Take 1 capsule (100 mg total) by mouth 2 (two) times daily. 20 capsule 0    fluocinolone acetonide oiL 0.01 % Drop 3-4 drops to the affected ear(s) twice daily no more than a week at a time as needed for itching and scaling 20 mL 0    magnesium 30 mg Tab Take 400 mg by mouth once.      meloxicam (MOBIC) 15 MG  tablet Take 1 tablet (15 mg total) by mouth daily as needed for Pain. 90 tablet 3    montelukast (SINGULAIR) 10 mg tablet Take 1 tablet (10 mg total) by mouth every evening. 90 tablet 3    multivitamin (THERAGRAN) per tablet Take 1 tablet by mouth once daily.      nortriptyline (PAMELOR) 10 MG capsule Take 2 capsules (20 mg total) by mouth every evening. 180 capsule 3    omega-3 fatty acids/fish oil (FISH OIL-OMEGA-3 FATTY ACIDS) 300-1,000 mg capsule Take by mouth once daily.      oxymetazoline (AFRIN, OXYMETAZOLINE,) 0.05 % nasal spray 2 spray tid x 3 days for severe congestion or bleeding 30 mL 2    promethazine-dextromethorphan (PROMETHAZINE-DM) 6.25-15 mg/5 mL Syrp TAKE 5 TO 10 ML BY MOUTH EVERY 8 HOURS AS NEEDED FOR COUGH. MAY CAUSE DROWSINESS      promethazine-dextromethorphan (PROMETHAZINE-DM) 6.25-15 mg/5 mL Syrp Take 5 mLs by mouth 4 (four) times daily as needed (cough). 240 mL 0    triamcinolone acetonide 0.1% (KENALOG) 0.1 % cream Apply topically daily as needed.   3     Facility-Administered Medications Prior to Visit   Medication Dose Route Frequency Provider Last Rate Last Admin    acetaminophen tablet 650 mg  650 mg Oral Once PRN Karthik Ford MD        diphenhydrAMINE injection 25 mg  25 mg Intravenous Once PRN Karthik Ford MD        methylPREDNISolone sodium succinate injection 40 mg  40 mg Intravenous Once PRN Karthik Ford MD           Review of patient's allergies indicates:  No Known Allergies    Family History   Problem Relation Age of Onset    Cancer Mother     Colon cancer Mother     No Known Problems Father     Breast cancer Maternal Aunt     Cancer Maternal Aunt     ALS Sister     No Known Problems Brother     Diverticulitis Son     Heart disease Maternal Uncle     Alzheimer's disease Maternal Grandmother     Ovarian cancer Neg Hx        Social History     Socioeconomic History    Marital status:    Tobacco Use    Smoking status: Never    Smokeless tobacco: Never   Substance and  "Sexual Activity    Alcohol use: Yes     Alcohol/week: 4.0 standard drinks of alcohol     Types: 2 Glasses of wine, 2 Cans of beer per week     Comment: 2x per week wine or beer    Drug use: No       OB History    Para Term  AB Living   3 3 3         SAB IAB Ectopic Multiple Live Births                  # Outcome Date GA Lbr Celestine/2nd Weight Sex Delivery Anes PTL Lv   3 Term            2 Term            1 Term                Review of Symptoms:  GENERAL: Denies weight gain or weight loss. Feeling well overall.   SKIN: Denies rash or lesions.   HEAD: Denies head injury or headache.   NODES: Denies enlarged lymph nodes.   CHEST: Denies chest pain or shortness of breath.   CARDIOVASCULAR: Denies palpitations or left sided chest pain.   ABDOMEN: No abdominal pain, constipation, diarrhea, nausea, vomiting or rectal bleeding.   URINARY: No frequency, dysuria, hematuria, or burning on urination.  HEMATOLOGIC: No easy bruisability or excessive bleeding.   MUSCULOSKELETAL: Denies joint pain or swelling.     /70   Resp 18   Ht 5' 4" (1.626 m)   Wt 63.3 kg (139 lb 8.8 oz)   Physical Exam:  APPEARANCE: Well nourished, well developed, in no acute distress.  SKIN: Normal skin turgor, no lesions.  NECK: Neck symmetric without masses   RESPIRATORY: Normal respiratory effort with no retractions or use of accessory muscles  CARDIOVASCULAR: Peripheral vascular system with no swelling no varicosities and palpation of pulses normal  LYMPHATIC: No enlargements of the lymph nodes noted in the neck, axillae, or groin  ABDOMEN: Soft. No tenderness or masses. No hepatosplenomegaly. No hernias.  BREASTS: right breast mastectomy left breast  no skin changes or visible lesions. No palpable masses, nipple discharge or adenopathy bilaterally.  PELVIC: Normal external female genitalia without lesions. Normal hair distribution. Adequate perineal body, normal urethral meatus. Urethra with no masses.  Bladder nontender. Vagina dry " and smooth no discharge. No significant cystocele or rectocele.  Adnexa without masses or tenderness. Urethra and bladder normal.   EXTREMITIES: No clubbing cyanosis or edema.    ASSESSMENT/PLAN:  Routine gynecological examination    S/P hysterectomy    Vaginal dryness  Comments:  consider LOCAL VAGINAL ESTRADIOL    S/P mastectomy, right    Breast cancer, stage 1, estrogen receptor positive, right          Patient was counseled today on Pap guidelines. We discussed the discontinuing the pap smear after hysterectomy except in certain high risk cases.  We discussed the need for pelvic exams.   We discussed STD screening if at high risk for an STD.  We discussed breast cancer screening with mammograms every other year after the age of 40 and annually after the age of 50.    We discussed colon cancer screening.   Osteoporosis screening with the Dexa Bone Scan discussed when indicated.   She will see her PCP for other health maintenance.       FOLLOW-UP:prn

## 2023-12-24 DIAGNOSIS — G44.221 CHRONIC TENSION-TYPE HEADACHE, INTRACTABLE: ICD-10-CM

## 2023-12-26 RX ORDER — NORTRIPTYLINE HYDROCHLORIDE 10 MG/1
20 CAPSULE ORAL NIGHTLY
Qty: 60 CAPSULE | Refills: 0 | Status: SHIPPED | OUTPATIENT
Start: 2023-12-26 | End: 2024-02-16

## 2024-02-06 ENCOUNTER — TELEPHONE (OUTPATIENT)
Dept: FAMILY MEDICINE | Facility: CLINIC | Age: 79
End: 2024-02-06
Payer: MEDICARE

## 2024-02-06 DIAGNOSIS — I73.9 PAD (PERIPHERAL ARTERY DISEASE): Primary | ICD-10-CM

## 2024-02-06 NOTE — TELEPHONE ENCOUNTER
Spoke to [facility] whom advised pt was aware of lab results and results were faxed to Dr. Ford's office. Due to abnormal labs they wanted to call to make sure Dr. Ford received results.  Peripheral arterial disease screening   left side moderate @ 0.5 and right side normal @ 1.08

## 2024-02-06 NOTE — TELEPHONE ENCOUNTER
----- Message from Sharon Adame sent at 2/6/2024  3:14 PM CST -----  Contact: [facility]  Type:  Needs Medical Advice  Who Called: [email or web address]  Best Call Back Number:  [phone] Option #2  Additional Information: Labs Results for pt...   Please call to advise/consult... Thank you

## 2024-02-08 NOTE — TELEPHONE ENCOUNTER
Where was this procedure perform? Do we have a test results signed by a doctor?  Otherwise repeat the test at [facility].  Thanks

## 2024-02-09 NOTE — TELEPHONE ENCOUNTER
Follow up call placed to [facility], spoke to Valentine who advised of the following:    --LAURA Drew Eller with [facility] performed an in home evaluation, this is a yearly service provided by patient's insurance.  During the evaluation LAURA Eller performed a PAD test (Peripheral Artery Disease test) that screens for blockages of the arteries of the legs.  An official copy of the results of the testing was mailed to the office.  Mrs. Grijalva stated due to an official copy of the result was mailed to the office she can not fax a copy of the results. The results of the testing are including in the original message routed to provider by DARSHANA Vizcaino.  The test was performed on 2-1-24, and a pulmonologist has also signed off on the results as well.  Will send follow up message to Dr. Ford for notification.

## 2024-02-15 DIAGNOSIS — G44.221 CHRONIC TENSION-TYPE HEADACHE, INTRACTABLE: ICD-10-CM

## 2024-02-16 ENCOUNTER — HOSPITAL ENCOUNTER (OUTPATIENT)
Dept: RADIOLOGY | Facility: HOSPITAL | Age: 79
Discharge: HOME | End: 2024-02-16
Attending: FAMILY MEDICINE
Payer: MEDICARE

## 2024-02-16 DIAGNOSIS — I73.9 PAD (PERIPHERAL ARTERY DISEASE): ICD-10-CM

## 2024-02-16 PROCEDURE — 93925 LOWER EXTREMITY STUDY: CPT | Mod: TC

## 2024-02-16 PROCEDURE — 93925 LOWER EXTREMITY STUDY: CPT | Mod: 26,,, | Performed by: RADIOLOGY

## 2024-02-16 RX ORDER — NORTRIPTYLINE HYDROCHLORIDE 10 MG/1
CAPSULE ORAL
Qty: 60 CAPSULE | Refills: 11 | Status: SHIPPED | OUTPATIENT
Start: 2024-02-16

## 2024-03-11 ENCOUNTER — LAB VISIT (OUTPATIENT)
Dept: LAB | Facility: HOSPITAL | Age: 79
End: 2024-03-11
Attending: FAMILY MEDICINE
Payer: MEDICARE

## 2024-03-11 DIAGNOSIS — E78.49 OTHER HYPERLIPIDEMIA: ICD-10-CM

## 2024-03-11 LAB
CHOLEST SERPL-MCNC: 182 MG/DL (ref 120–199)
CHOLEST/HDLC SERPL: 3.2 {RATIO} (ref 2–5)
HDLC SERPL-MCNC: 57 MG/DL (ref 40–75)
HDLC SERPL: 31.3 % (ref 20–50)
LDLC SERPL CALC-MCNC: 94.8 MG/DL (ref 63–159)
NONHDLC SERPL-MCNC: 125 MG/DL
TRIGL SERPL-MCNC: 151 MG/DL (ref 30–150)

## 2024-03-11 PROCEDURE — 80061 LIPID PANEL: CPT | Performed by: FAMILY MEDICINE

## 2024-03-11 PROCEDURE — 36415 COLL VENOUS BLD VENIPUNCTURE: CPT | Mod: PO | Performed by: FAMILY MEDICINE

## 2024-03-21 ENCOUNTER — TELEPHONE (OUTPATIENT)
Dept: PRIMARY CARE CLINIC | Facility: CLINIC | Age: 79
End: 2024-03-21
Payer: MEDICARE

## 2024-03-21 NOTE — TELEPHONE ENCOUNTER
Amy Fernandez Staff     ----- Message from Amy Fernandez sent at 3/21/2024  4:19 PM CDT -----  Contact: [facility]  Type: Needs Medical Advice         Who Called: [facility]  Best Call Back Number:[phone]  option 2  Additional Information: Requesting a call back regarding  They are asking for the office to to call them Pt has inhome visit from 02/01/2024 regarding some testing results.     Please Advise- Thank you

## 2024-03-21 NOTE — TELEPHONE ENCOUNTER
Call placed to number in attached message x's 2.  Received voicemail stating extension [phone] is unavailable.  Left message requesting return call to office.

## 2024-03-22 NOTE — TELEPHONE ENCOUNTER
Follow up call placed to [facility].  No answer received. Left message requesting return call to office.  Message also indicated any needed information could be faxed to office at [phone].

## 2024-03-25 ENCOUNTER — OFFICE VISIT (OUTPATIENT)
Dept: PRIMARY CARE CLINIC | Facility: CLINIC | Age: 79
End: 2024-03-25
Payer: MEDICARE

## 2024-03-25 VITALS
WEIGHT: 141.31 LBS | HEIGHT: 64 IN | HEART RATE: 85 BPM | DIASTOLIC BLOOD PRESSURE: 60 MMHG | SYSTOLIC BLOOD PRESSURE: 110 MMHG | TEMPERATURE: 99 F | OXYGEN SATURATION: 96 % | BODY MASS INDEX: 24.13 KG/M2

## 2024-03-25 DIAGNOSIS — R10.9 STOMACH ACHE: ICD-10-CM

## 2024-03-25 DIAGNOSIS — H60.90 OTITIS EXTERNA, UNSPECIFIED CHRONICITY, UNSPECIFIED LATERALITY, UNSPECIFIED TYPE: ICD-10-CM

## 2024-03-25 DIAGNOSIS — J45.51 SEVERE PERSISTENT ASTHMA WITH ALLERGIC RHINITIS WITH ACUTE EXACERBATION: Primary | ICD-10-CM

## 2024-03-25 PROCEDURE — 1126F AMNT PAIN NOTED NONE PRSNT: CPT | Mod: CPTII,S$GLB,, | Performed by: FAMILY MEDICINE

## 2024-03-25 PROCEDURE — 3078F DIAST BP <80 MM HG: CPT | Mod: CPTII,S$GLB,, | Performed by: FAMILY MEDICINE

## 2024-03-25 PROCEDURE — 3288F FALL RISK ASSESSMENT DOCD: CPT | Mod: CPTII,S$GLB,, | Performed by: FAMILY MEDICINE

## 2024-03-25 PROCEDURE — 1101F PT FALLS ASSESS-DOCD LE1/YR: CPT | Mod: CPTII,S$GLB,, | Performed by: FAMILY MEDICINE

## 2024-03-25 PROCEDURE — 99999 PR PBB SHADOW E&M-EST. PATIENT-LVL IV: CPT | Mod: PBBFAC,,, | Performed by: FAMILY MEDICINE

## 2024-03-25 PROCEDURE — 99213 OFFICE O/P EST LOW 20 MIN: CPT | Mod: S$GLB,,, | Performed by: FAMILY MEDICINE

## 2024-03-25 PROCEDURE — 3074F SYST BP LT 130 MM HG: CPT | Mod: CPTII,S$GLB,, | Performed by: FAMILY MEDICINE

## 2024-03-25 PROCEDURE — 1159F MED LIST DOCD IN RCRD: CPT | Mod: CPTII,S$GLB,, | Performed by: FAMILY MEDICINE

## 2024-03-25 RX ORDER — MONTELUKAST SODIUM 10 MG/1
10 TABLET ORAL NIGHTLY
Qty: 90 TABLET | Refills: 3 | Status: SHIPPED | OUTPATIENT
Start: 2024-03-25

## 2024-03-25 RX ORDER — FLUOCINOLONE ACETONIDE 0.11 MG/ML
OIL AURICULAR (OTIC)
Qty: 20 ML | Refills: 3 | Status: SHIPPED | OUTPATIENT
Start: 2024-03-25

## 2024-03-25 RX ORDER — DICYCLOMINE HYDROCHLORIDE 20 MG/1
20 TABLET ORAL 3 TIMES DAILY PRN
Qty: 90 TABLET | Refills: 1 | Status: SHIPPED | OUTPATIENT
Start: 2024-03-25

## 2024-03-25 RX ORDER — FLUTICASONE PROPIONATE 50 MCG
1 SPRAY, SUSPENSION (ML) NASAL DAILY
Qty: 18 ML | Refills: 4 | Status: SHIPPED | OUTPATIENT
Start: 2024-03-25

## 2024-03-25 NOTE — PATIENT INSTRUCTIONS
Patient Education       Type 2 Diabetes Discharge Instructions   About this topic   Type 2 diabetes is the most common type of diabetes. If you have this illness, your body does not make enough insulin or does not correctly use the insulin it does make. When you eat, your body breaks down all sugars and starches into glucose. Your body needs insulin to use glucose for energy. The insulin takes the glucose from your blood into your cells. If you do not have enough insulin, or your body does not use the insulin well, the glucose or sugar stays in your blood instead of going into your cells. This causes your blood sugar levels to be too high. Over time, this can damage your heart, nerves, eyes, kidneys, and other organs.     What care is needed at home?   Learn how to take care of your diabetes.  Ask your doctor what you need to do when you go home. Make sure you ask questions if you do not understand what the doctor says. This way you will know what you need to do.  Based on what diabetes drugs you take, you might need to check your blood sugar regularly at home. But not everyone with type 2 diabetes needs to do this. If you need to, your doctor will teach you how to check your blood sugar. Ask what the goal numbers are for your blood sugar. Keep a list of your blood sugar levels. This will help you learn what causes high or low readings and help you manage your diabetes.     Take your diabetes drugs as directed. Ask what to do if you miss a dose of your diabetes drugs.  Learn when, what, and how much to eat.  Be active. Walk, garden, or do something active for 30 minutes or more on most days of the week. This will also help you control your weight. Ask your doctor for proper food and exercise programs to follow.  Check your feet often. Look for blisters or sores. Always wear socks and shoes. Never walk barefoot, especially outdoors. Take special care around the pool and at the beach, as these surfaces may be  extremely hot and burn your feet. Report any problems with your feet to your doctor.  Wear a medical ID.  Limit or avoid beer, wine, and mixed drinks (alcohol).  If you smoke, try to quit. Your doctor or nurse can help.  Talk with your doctor about if you need to check your blood sugar at home.  If you are overweight, ask your doctor if you would be a good candidate for bariatric surgery as this can reverse diabetes in some cases.  What follow-up care is needed?   Your doctor may ask you to make visits to the office to check on your progress. Be sure to keep these visits.  What drugs may be needed?   Diabetes drugs will help control your blood sugar. You may have more than one diabetes drug. Your doctor may order drugs for you to take by mouth or insulin as a shot. You will be trained on how to give insulin shots, if needed. Talk to your doctor about your diabetes drugs and what you need to do when you go home.  Will physical activity be limited?   Being active can lower blood sugar and blood pressure. It can also help control weight. Be sure to check with your doctor before starting any exercise program.  Try to walk, bike, or swim every day. Start with 5 to 10 minutes each day. Work up to about 30 minutes most days.  Drink lots of water during workouts.  What changes to diet are needed?   Eating a healthy diet is important. This means you need to eat regularly throughout the day. You need to include a variety of foods such as fruits and vegetables, whole grains, nonfat dairy products, and lean meats. Do not eat too much food at one time and do not skip meals. Limit foods high in sugar like sweets, desserts, and fruit juices. Ask your doctor about what kind of diet is right for you.  What problems could happen?   Heart, kidney, and nerve problems  Foot problems. Sores and infection may also happen.  Eye problems  Dangerously high blood sugar levels requiring emergency treatment  Severe infections  Talk with your  doctor often. Other drugs or care may be needed to treat or prevent these problems.  When do I need to call the doctor?   You have signs of high blood sugar like you:  Are more thirsty  Are urinating more often  Have new shortness of breath  Have belly pain, an upset stomach, or are throwing up.  Are more tired than normal  Have a very dry mouth or a fruity breath odor  Signs of infection. These include a fever of 100.4°F (38°C) or higher, chills, or a wound that will not heal.  Blurred vision  Chest pain  Swelling in your legs  Change in color and odor of your feet  Blood sugar that remains high and does not respond to treatment  Helpful tips   Talk to your doctor about getting a flu shot and pneumonia shot.  Teach Back: Helping You Understand   The Teach Back Method helps you understand the information we are giving you. After you talk with the staff, tell them in your own words what you learned. This helps to make sure the staff has described each thing clearly. It also helps to explain things that may have been confusing. Before going home, make sure you can do these:  I can tell you about my condition.  I can tell you what I need to do to control my blood sugar.  I can tell you what I will do if I have signs of high blood sugar.  Where can I learn more?   Center for Disease Control and Prevention  https://www.cdc.gov/diabetes/basics/type2.html   International Diabetes Foundation  https://www.idf.org/aboutdiabetes/type-2-diabetes.html   UpToDate  https://www.[employer].com/contents/type-2-diabetes-overview-beyond-the-basics   Last Reviewed Date   2021-05-04  Consumer Information Use and Disclaimer   This information is not specific medical advice and does not replace information you receive from your health care provider. This is only a brief summary of general information. It does NOT include all information about conditions, illnesses, injuries, tests, procedures, treatments, therapies, discharge instructions or  life-style choices that may apply to you. You must talk with your health care provider for complete information about your health and treatment options. This information should not be used to decide whether or not to accept your health care providers advice, instructions or recommendations. Only your health care provider has the knowledge and training to provide advice that is right for you.  Copyright   Copyright © 2021 [employer] and its affiliates and/or licensors. All rights reserved.

## 2024-03-28 ENCOUNTER — PATIENT MESSAGE (OUTPATIENT)
Dept: FAMILY MEDICINE | Facility: CLINIC | Age: 79
End: 2024-03-28
Payer: MEDICARE

## 2024-04-01 NOTE — PROGRESS NOTES
Subjective:       Patient ID: Liliana Hairston is a 78 y.o. female.    Chief Complaint: Follow-up  Asthma Follow-up: She has previously been evaluated here for asthma and presents for an asthma follow-up; she is not currently in exacerbation. Symptoms currently include non-productive cough and wheezing and occur less than 2x/month. Observed precipitants include animal dander, cold air, pollens, strong odors, and upper respiratory infection.  Current limitations in activity from asthma: none.  Number of days of school or work missed in the last month: not applicable. Number of Emergency Department visits in the previous month: none. Frequency of use of quick-relief meds: rare. The patient reports adherence to this regimen    .      Annual exam  Past Medical History:  No date: Breast cancer  No date: Cancer      Comment:  breast, right '97  No date: Cataract  No date: Headache        Follow-up  Pertinent negatives include no abdominal pain, chest pain or fatigue.     Review of Systems   Constitutional:  Negative for fatigue and unexpected weight change.   HENT:  Positive for nosebleeds.    Respiratory:  Negative for chest tightness and shortness of breath.    Cardiovascular:  Negative for chest pain.   Gastrointestinal:  Negative for abdominal pain.   Genitourinary:  Negative for menstrual problem.   Allergic/Immunologic: Negative for food allergies.   Psychiatric/Behavioral:  Negative for dysphoric mood.        Patient Active Problem List   Diagnosis    Cancer    Primary osteoarthritis of right knee    Bilateral sensorineural hearing loss    Tinnitus of both ears    Diverticulosis of large intestine without hemorrhage    History of breast cancer    Acute midline low back pain without sciatica    Other hyperlipidemia    Intractable episodic cluster headache    Cough    Decreased range of motion of intervertebral discs of cervical spine    Tenderness    Decreased strength    Long term (current) use of aromatase  inhibitors    Malignant neoplasm of upper-outer quadrant of right breast in female, estrogen receptor positive   Chronic hx eczema of the ears.    Objective:      Physical Exam  Vitals and nursing note reviewed.   Constitutional:       Appearance: She is well-developed.   HENT:      Ears:      Comments: Ext canal with dry skin and erythema.  Neck:      Thyroid: No thyromegaly.   Cardiovascular:      Rate and Rhythm: Normal rate and regular rhythm.      Heart sounds: Normal heart sounds.   Pulmonary:      Effort: Pulmonary effort is normal.      Breath sounds: Normal breath sounds.   Chest:   Breasts:     Breasts are symmetrical.      Right: No inverted nipple, mass, nipple discharge, skin change or tenderness.      Left: No inverted nipple, mass, nipple discharge, skin change or tenderness.   Abdominal:      General: Bowel sounds are normal. There is no distension.      Palpations: Abdomen is soft.      Tenderness: There is no abdominal tenderness.   Genitourinary:     Exam position: Supine.      Labia:         Right: No rash, tenderness, lesion or injury.         Left: No rash, tenderness, lesion or injury.       Vagina: Normal. No vaginal discharge.      Cervix: No cervical motion tenderness, discharge or friability.      Adnexa:         Right: No mass, tenderness or fullness.          Left: No mass, tenderness or fullness.     Skin:     General: Skin is warm and dry.   Neurological:      Mental Status: She is alert and oriented to person, place, and time.         Lab Results   Component Value Date    WBC 7.27 11/22/2023    HGB 13.8 11/22/2023    HCT 42.4 11/22/2023     11/22/2023    CHOL 182 03/11/2024    TRIG 151 (H) 03/11/2024    HDL 57 03/11/2024    ALT 13 11/22/2023    AST 15 11/22/2023     11/22/2023    K 4.0 11/22/2023     11/22/2023    CREATININE 0.5 11/22/2023    BUN 11 11/22/2023    CO2 25 11/22/2023    TSH 1.467 11/14/2023    HGBA1C 5.3 11/14/2023     The 10-year ASCVD risk score  (Carl FRAZIER, et al., 2019) is: 15.1%    Values used to calculate the score:      Age: 78 years      Sex: Female      Is Non- : No      Diabetic: No      Tobacco smoker: No      Systolic Blood Pressure: 110 mmHg      Is BP treated: No      HDL Cholesterol: 57 mg/dL      Total Cholesterol: 182 mg/dL    Assessment:       1. Severe persistent asthma with allergic rhinitis with acute exacerbation    2. Stomach ache    3. Otitis externa, unspecified chronicity, unspecified laterality, unspecified type          Plan:       Annual physical exam  Liliana was seen today for follow-up.    Diagnoses and all orders for this visit:    Severe persistent asthma with allergic rhinitis with acute exacerbation  -     fluticasone propionate (FLONASE) 50 mcg/actuation nasal spray; 1 spray (50 mcg total) by Each Nostril route once daily.  -     montelukast (SINGULAIR) 10 mg tablet; Take 1 tablet (10 mg total) by mouth every evening.    Stomach ache  -     dicyclomine (BENTYL) 20 mg tablet; Take 1 tablet (20 mg total) by mouth 3 (three) times daily as needed (stomache).    Otitis externa, unspecified chronicity, unspecified laterality, unspecified type  -     fluocinolone acetonide oiL 0.01 % Drop; 3-4 drops to the affected ear(s) twice daily no more than a week at a time as needed for itching and scaling        Epistaxis  -     oxymetazoline (AFRIN, OXYMETAZOLINE,) 0.05 % nasal spray; 2 spray tid x 3 days for severe congestion or bleeding  Dispense: 30 mL; Refill: 2  Stable and controlled. Continue current treatment plan as previously prescribed with your PCP.     Colon cancer screening  -     Fecal Immunochemical Test (iFOBT); Future; Expected date: 12/09/2022    No orders of the defined types were placed in this encounter.      Patient readiness: acceptance and barriers:none    During the course of the visit the patient was educated and counseled about the following:     Activity, immunization and Social  interavtions encourage.    Goals Achived    Did patient meet goals/outcomes: Yes    The following self management tools provided: excercise log    Patient Instructions (the written plan) was given to the patient/family.     Time spent with patient: 30 minutes    Barriers to medications present (no )    Adverse reactions to current medications (no)    Over the counter medications reviewed (Yes)        30-minute visit. 10 minutes spent counseling patient on diet, exercise, and weight loss.  This has been fully explained to the patient, who indicates understanding.      Discussed health maintenance guidelines appropriate for age.  Discussed health maintenance guidelines appropriate for age.  Discussed health maintenance guidelines appropriate for age.

## 2024-05-31 ENCOUNTER — LAB VISIT (OUTPATIENT)
Dept: LAB | Facility: HOSPITAL | Age: 79
End: 2024-05-31
Attending: INTERNAL MEDICINE
Payer: MEDICARE

## 2024-05-31 DIAGNOSIS — Z17.0 MALIGNANT NEOPLASM OF UPPER-OUTER QUADRANT OF RIGHT BREAST IN FEMALE, ESTROGEN RECEPTOR POSITIVE: ICD-10-CM

## 2024-05-31 DIAGNOSIS — Z79.811 LONG TERM (CURRENT) USE OF AROMATASE INHIBITORS: ICD-10-CM

## 2024-05-31 DIAGNOSIS — C50.411 MALIGNANT NEOPLASM OF UPPER-OUTER QUADRANT OF RIGHT BREAST IN FEMALE, ESTROGEN RECEPTOR POSITIVE: ICD-10-CM

## 2024-05-31 LAB
ALBUMIN SERPL BCP-MCNC: 4.5 G/DL (ref 3.5–5.2)
ALP SERPL-CCNC: 89 U/L (ref 55–135)
ALT SERPL W/O P-5'-P-CCNC: 13 U/L (ref 10–44)
ANION GAP SERPL CALC-SCNC: 8 MMOL/L (ref 8–16)
AST SERPL-CCNC: 16 U/L (ref 10–40)
BASOPHILS # BLD AUTO: 0.03 K/UL (ref 0–0.2)
BASOPHILS NFR BLD: 0.5 % (ref 0–1.9)
BILIRUB SERPL-MCNC: 0.4 MG/DL (ref 0.1–1)
BUN SERPL-MCNC: 9 MG/DL (ref 8–23)
CALCIUM SERPL-MCNC: 9.7 MG/DL (ref 8.7–10.5)
CEA SERPL-MCNC: 0.9 NG/ML (ref 0–5)
CHLORIDE SERPL-SCNC: 103 MMOL/L (ref 95–110)
CO2 SERPL-SCNC: 27 MMOL/L (ref 23–29)
CREAT SERPL-MCNC: 0.6 MG/DL (ref 0.5–1.4)
DIFFERENTIAL METHOD BLD: NORMAL
EOSINOPHIL # BLD AUTO: 0.2 K/UL (ref 0–0.5)
EOSINOPHIL NFR BLD: 2.6 % (ref 0–8)
ERYTHROCYTE [DISTWIDTH] IN BLOOD BY AUTOMATED COUNT: 12.4 % (ref 11.5–14.5)
EST. GFR  (NO RACE VARIABLE): >60 ML/MIN/1.73 M^2
GLUCOSE SERPL-MCNC: 95 MG/DL (ref 70–110)
HCT VFR BLD AUTO: 43 % (ref 37–48.5)
HGB BLD-MCNC: 14.1 G/DL (ref 12–16)
IMM GRANULOCYTES # BLD AUTO: 0.02 K/UL (ref 0–0.04)
IMM GRANULOCYTES NFR BLD AUTO: 0.3 % (ref 0–0.5)
LYMPHOCYTES # BLD AUTO: 1.9 K/UL (ref 1–4.8)
LYMPHOCYTES NFR BLD: 29 % (ref 18–48)
MCH RBC QN AUTO: 30.7 PG (ref 27–31)
MCHC RBC AUTO-ENTMCNC: 32.8 G/DL (ref 32–36)
MCV RBC AUTO: 94 FL (ref 82–98)
MONOCYTES # BLD AUTO: 0.6 K/UL (ref 0.3–1)
MONOCYTES NFR BLD: 9.1 % (ref 4–15)
NEUTROPHILS # BLD AUTO: 3.9 K/UL (ref 1.8–7.7)
NEUTROPHILS NFR BLD: 58.5 % (ref 38–73)
NRBC BLD-RTO: 0 /100 WBC
PLATELET # BLD AUTO: 333 K/UL (ref 150–450)
PMV BLD AUTO: 9.2 FL (ref 9.2–12.9)
POTASSIUM SERPL-SCNC: 4 MMOL/L (ref 3.5–5.1)
PROT SERPL-MCNC: 7.4 G/DL (ref 6–8.4)
RBC # BLD AUTO: 4.6 M/UL (ref 4–5.4)
SODIUM SERPL-SCNC: 138 MMOL/L (ref 136–145)
WBC # BLD AUTO: 6.61 K/UL (ref 3.9–12.7)

## 2024-05-31 PROCEDURE — 82378 CARCINOEMBRYONIC ANTIGEN: CPT | Performed by: INTERNAL MEDICINE

## 2024-05-31 PROCEDURE — 36415 COLL VENOUS BLD VENIPUNCTURE: CPT | Performed by: INTERNAL MEDICINE

## 2024-05-31 PROCEDURE — 80053 COMPREHEN METABOLIC PANEL: CPT | Performed by: INTERNAL MEDICINE

## 2024-05-31 PROCEDURE — 85025 COMPLETE CBC W/AUTO DIFF WBC: CPT | Performed by: INTERNAL MEDICINE

## 2024-05-31 PROCEDURE — 86300 IMMUNOASSAY TUMOR CA 15-3: CPT | Mod: 91 | Performed by: INTERNAL MEDICINE

## 2024-05-31 PROCEDURE — 86300 IMMUNOASSAY TUMOR CA 15-3: CPT | Performed by: INTERNAL MEDICINE

## 2024-06-02 LAB — CANCER AG15-3 SERPL-ACNC: 23 U/ML (ref 0–25)

## 2024-06-05 LAB — CANCER AG27-29 SERPL-ACNC: 28.6 U/ML (ref 0–38.6)

## 2024-09-24 ENCOUNTER — PATIENT MESSAGE (OUTPATIENT)
Dept: PRIMARY CARE CLINIC | Facility: CLINIC | Age: 79
End: 2024-09-24
Payer: MEDICARE

## 2024-09-30 ENCOUNTER — OFFICE VISIT (OUTPATIENT)
Dept: PRIMARY CARE CLINIC | Facility: CLINIC | Age: 79
End: 2024-09-30
Payer: MEDICARE

## 2024-09-30 VITALS
BODY MASS INDEX: 23.93 KG/M2 | HEIGHT: 64 IN | DIASTOLIC BLOOD PRESSURE: 70 MMHG | SYSTOLIC BLOOD PRESSURE: 110 MMHG | HEART RATE: 79 BPM | WEIGHT: 140.19 LBS | TEMPERATURE: 98 F | OXYGEN SATURATION: 96 %

## 2024-09-30 DIAGNOSIS — H93.13 TINNITUS OF BOTH EARS: ICD-10-CM

## 2024-09-30 DIAGNOSIS — Z23 NEEDS FLU SHOT: Primary | ICD-10-CM

## 2024-09-30 DIAGNOSIS — L29.9 ITCHING OF EAR: ICD-10-CM

## 2024-09-30 DIAGNOSIS — Z00.00 ANNUAL PHYSICAL EXAM: ICD-10-CM

## 2024-09-30 PROCEDURE — 1126F AMNT PAIN NOTED NONE PRSNT: CPT | Mod: CPTII,S$GLB,, | Performed by: FAMILY MEDICINE

## 2024-09-30 PROCEDURE — 3078F DIAST BP <80 MM HG: CPT | Mod: CPTII,S$GLB,, | Performed by: FAMILY MEDICINE

## 2024-09-30 PROCEDURE — 99999 PR PBB SHADOW E&M-EST. PATIENT-LVL V: CPT | Mod: PBBFAC,,, | Performed by: FAMILY MEDICINE

## 2024-09-30 PROCEDURE — 3288F FALL RISK ASSESSMENT DOCD: CPT | Mod: CPTII,S$GLB,, | Performed by: FAMILY MEDICINE

## 2024-09-30 PROCEDURE — 1159F MED LIST DOCD IN RCRD: CPT | Mod: CPTII,S$GLB,, | Performed by: FAMILY MEDICINE

## 2024-09-30 PROCEDURE — G0008 ADMIN INFLUENZA VIRUS VAC: HCPCS | Mod: S$GLB,,, | Performed by: FAMILY MEDICINE

## 2024-09-30 PROCEDURE — 90653 IIV ADJUVANT VACCINE IM: CPT | Mod: S$GLB,,, | Performed by: FAMILY MEDICINE

## 2024-09-30 PROCEDURE — 1160F RVW MEDS BY RX/DR IN RCRD: CPT | Mod: CPTII,S$GLB,, | Performed by: FAMILY MEDICINE

## 2024-09-30 PROCEDURE — 3074F SYST BP LT 130 MM HG: CPT | Mod: CPTII,S$GLB,, | Performed by: FAMILY MEDICINE

## 2024-09-30 PROCEDURE — 99397 PER PM REEVAL EST PAT 65+ YR: CPT | Mod: 25,S$GLB,, | Performed by: FAMILY MEDICINE

## 2024-09-30 PROCEDURE — 99497 ADVNCD CARE PLAN 30 MIN: CPT | Mod: S$GLB,,, | Performed by: FAMILY MEDICINE

## 2024-09-30 PROCEDURE — 1101F PT FALLS ASSESS-DOCD LE1/YR: CPT | Mod: CPTII,S$GLB,, | Performed by: FAMILY MEDICINE

## 2024-09-30 PROCEDURE — 1123F ACP DISCUSS/DSCN MKR DOCD: CPT | Mod: CPTII,S$GLB,, | Performed by: FAMILY MEDICINE

## 2024-10-15 NOTE — PROGRESS NOTES
(S) 78 y.o. female complains of pain in left ear for 6 days. No fever or URI symptoms. Has been swimming.  SUBJECTIVE:   78 y.o. female for annual routine Pap and checkup.  Current Outpatient Medications   Medication Sig Dispense Refill    anastrozole (ARIMIDEX) 1 mg Tab Take 1 tablet (1 mg total) by mouth once daily. 90 tablet 3    CALCIUM CARBONATE/VITAMIN D3 (CALCIUM 500 + D ORAL) Take 1 each by mouth daily as needed.       dicyclomine (BENTYL) 20 mg tablet Take 1 tablet (20 mg total) by mouth 3 (three) times daily as needed (stomache). 90 tablet 1    fluocinolone acetonide oiL 0.01 % Drop 3-4 drops to the affected ear(s) twice daily no more than a week at a time as needed for itching and scaling 20 mL 3    magnesium 30 mg Tab Take 400 mg by mouth once.      montelukast (SINGULAIR) 10 mg tablet Take 1 tablet (10 mg total) by mouth every evening. 90 tablet 3    multivitamin (THERAGRAN) per tablet Take 1 tablet by mouth once daily.      nortriptyline (PAMELOR) 10 MG capsule TAKE 2 CAPSULES EVERY EVENING (NEED APPOINTMENT TO RECEIVE FURTHER REFILLS) 60 capsule 11    omega-3 fatty acids/fish oil (FISH OIL-OMEGA-3 FATTY ACIDS) 300-1,000 mg capsule Take by mouth once daily.      (Magic mouthwash) 1:1:1 diphenhydrAMINE(Benadryl) 12.5mg/5ml liq, aluminum & magnesium hydroxide-simethicone (Maalox), LIDOcaine viscous 2% Swish and spit 5 mLs 4 (four) times daily as needed (sore throat). for mouth sores 360 mL 0    albuterol (PROVENTIL/VENTOLIN HFA) 90 mcg/actuation inhaler Inhale 2 puffs into the lungs 4 (four) times daily. 18 g 0    fluticasone propionate (FLONASE) 50 mcg/actuation nasal spray 1 spray (50 mcg total) by Each Nostril route once daily. 18 mL 4    meloxicam (MOBIC) 15 MG tablet Take 1 tablet (15 mg total) by mouth daily as needed for Pain. 90 tablet 3    oxymetazoline (AFRIN, OXYMETAZOLINE,) 0.05 % nasal spray 2 spray tid x 3 days for severe congestion or bleeding 30 mL 2    promethazine-dextromethorphan  "(PROMETHAZINE-DM) 6.25-15 mg/5 mL Syrp TAKE 5 TO 10 ML BY MOUTH EVERY 8 HOURS AS NEEDED FOR COUGH. MAY CAUSE DROWSINESS      promethazine-dextromethorphan (PROMETHAZINE-DM) 6.25-15 mg/5 mL Syrp Take 5 mLs by mouth 4 (four) times daily as needed (cough). 240 mL 0    triamcinolone acetonide 0.1% (KENALOG) 0.1 % cream Apply topically daily as needed.   3     Current Facility-Administered Medications   Medication Dose Route Frequency Provider Last Rate Last Admin    diphenhydrAMINE injection 25 mg  25 mg Intravenous Once PRN Karthik Ford MD         Allergies: Patient has no known allergies.   No LMP recorded. Patient is postmenopausal.    ROS:  Feeling well. No dyspnea or chest pain on exertion.  No abdominal pain, change in bowel habits, black or bloody stools.  No urinary tract symptoms. GYN ROS: no breast pain or new or enlarging lumps on self exam, no vaginal bleeding. No neurological complaints.    OBJECTIVE:   The patient appears well, alert, oriented x 3, in no distress.  /70 (BP Location: Left arm, Patient Position: Sitting)   Pulse 79   Temp 98.2 °F (36.8 °C) (Oral)   Ht 5' 4" (1.626 m)   Wt 63.6 kg (140 lb 3.4 oz)   SpO2 96%   BMI 24.07 kg/m²   ENT normal. (O) She appears well, afebrile. Bilateral ear reveals no tenderness of the tragus; no debris nor inflammation in external canal. TM is normal but visualized aspects appear normal. Neck supple. No adenopathy or thyromegaly. ANIRUDH. Lungs are clear, good air entry, no wheezes, rhonchi or rales. S1 and S2 normal, no murmurs, regular rate and rhythm. Abdomen soft without tenderness, guarding, mass or organomegaly. Extremities show no edema, normal peripheral pulses. Neurological is normal, no focal findings.    BREAST EXAM: not examined    PELVIC EXAM: examination not indicated          (A) ASSESSMENT:   well woman    PLAN:   mammogram  return annually or prn  1. Needs flu shot  influenza (adjuvanted) (Fluad) 45 mcg/0.5 mL IM vaccine (> or = 66 yo) " 0.5 mL      2. Tinnitus of both ears        3. Itching of ear        Advance Care Planning     Date: 10/15/2024    Living Will  During this visit, I engaged the patient  in the voluntary advance care planning process.  The patient and I reviewed the role for advance directives and their purpose in directing future healthcare if the patient's unable to speak for him/herself.  At this point in time, the patient does have full decision-making capacity.  We discussed different extreme health states that she could experience, and reviewed what kind of medical care she would want in those situations.  The patient communicated that if she were comatose and had little chance of a meaningful recovery, she would not want machines/life-sustaining treatments used. In addition to the above preference, other important end-of-life issues for the patient include  stay-at-home and comfort . The patient has completed a living will to reflect these preferences.  I spent a total of 20 minutes engaging the patient in this advance care planning discussion.           White noise at night.  Appointment for audiology.  Avoid ibuprofen.  Reassure  (P) Instructed to keep ear dry until better; eardrops per orders, call if persistent pain, swelling or fever, FUV prn.  Discussed health maintenance guidelines appropriate for age.

## 2024-11-27 ENCOUNTER — LAB VISIT (OUTPATIENT)
Dept: LAB | Facility: HOSPITAL | Age: 79
End: 2024-11-27
Attending: INTERNAL MEDICINE
Payer: MEDICARE

## 2024-11-27 DIAGNOSIS — L40.3 SAPHO SYNDROME: ICD-10-CM

## 2024-11-27 DIAGNOSIS — M85.80 SAPHO SYNDROME: ICD-10-CM

## 2024-11-27 DIAGNOSIS — D05.11 INTRADUCTAL CARCINOMA IN SITU OF RIGHT BREAST: ICD-10-CM

## 2024-11-27 DIAGNOSIS — C50.411 MALIGNANT NEOPLASM OF UPPER-OUTER QUADRANT OF RIGHT FEMALE BREAST: Primary | ICD-10-CM

## 2024-11-27 DIAGNOSIS — Z85.3 PERSONAL HISTORY OF MALIGNANT NEOPLASM OF BREAST: ICD-10-CM

## 2024-11-27 DIAGNOSIS — L70.9 SAPHO SYNDROME: ICD-10-CM

## 2024-11-27 DIAGNOSIS — C80.1 DILATED CARDIOMYOPATHY SECONDARY TO MALIGNANCY: ICD-10-CM

## 2024-11-27 DIAGNOSIS — M65.90 SAPHO SYNDROME: ICD-10-CM

## 2024-11-27 DIAGNOSIS — I42.0 DILATED CARDIOMYOPATHY SECONDARY TO MALIGNANCY: ICD-10-CM

## 2024-11-27 DIAGNOSIS — M86.9 SAPHO SYNDROME: ICD-10-CM

## 2024-11-27 DIAGNOSIS — I82.439: ICD-10-CM

## 2024-11-27 DIAGNOSIS — I82.419: ICD-10-CM

## 2024-11-27 LAB
ALBUMIN SERPL BCP-MCNC: 4.3 G/DL (ref 3.5–5.2)
ALP SERPL-CCNC: 156 U/L (ref 55–135)
ALT SERPL W/O P-5'-P-CCNC: 165 U/L (ref 10–44)
ANION GAP SERPL CALC-SCNC: 8 MMOL/L (ref 8–16)
AST SERPL-CCNC: 39 U/L (ref 10–40)
BASOPHILS # BLD AUTO: 0.03 K/UL (ref 0–0.2)
BASOPHILS NFR BLD: 0.3 % (ref 0–1.9)
BILIRUB SERPL-MCNC: 0.5 MG/DL (ref 0.1–1)
BUN SERPL-MCNC: 8 MG/DL (ref 8–23)
CALCIUM SERPL-MCNC: 9.9 MG/DL (ref 8.7–10.5)
CEA SERPL-MCNC: 0.8 NG/ML (ref 0–5)
CHLORIDE SERPL-SCNC: 99 MMOL/L (ref 95–110)
CO2 SERPL-SCNC: 27 MMOL/L (ref 23–29)
CREAT SERPL-MCNC: 0.5 MG/DL (ref 0.5–1.4)
DIFFERENTIAL METHOD BLD: ABNORMAL
EOSINOPHIL # BLD AUTO: 0.1 K/UL (ref 0–0.5)
EOSINOPHIL NFR BLD: 1.3 % (ref 0–8)
ERYTHROCYTE [DISTWIDTH] IN BLOOD BY AUTOMATED COUNT: 12.5 % (ref 11.5–14.5)
EST. GFR  (NO RACE VARIABLE): >60 ML/MIN/1.73 M^2
GLUCOSE SERPL-MCNC: 108 MG/DL (ref 70–110)
HCT VFR BLD AUTO: 40.9 % (ref 37–48.5)
HGB BLD-MCNC: 13.7 G/DL (ref 12–16)
IMM GRANULOCYTES # BLD AUTO: 0.04 K/UL (ref 0–0.04)
IMM GRANULOCYTES NFR BLD AUTO: 0.4 % (ref 0–0.5)
LYMPHOCYTES # BLD AUTO: 2.2 K/UL (ref 1–4.8)
LYMPHOCYTES NFR BLD: 22.9 % (ref 18–48)
MCH RBC QN AUTO: 31.4 PG (ref 27–31)
MCHC RBC AUTO-ENTMCNC: 33.5 G/DL (ref 32–36)
MCV RBC AUTO: 94 FL (ref 82–98)
MONOCYTES # BLD AUTO: 0.8 K/UL (ref 0.3–1)
MONOCYTES NFR BLD: 8 % (ref 4–15)
NEUTROPHILS # BLD AUTO: 6.4 K/UL (ref 1.8–7.7)
NEUTROPHILS NFR BLD: 67.1 % (ref 38–73)
NRBC BLD-RTO: 0 /100 WBC
PLATELET # BLD AUTO: 310 K/UL (ref 150–450)
PMV BLD AUTO: 8.8 FL (ref 9.2–12.9)
POTASSIUM SERPL-SCNC: 4.3 MMOL/L (ref 3.5–5.1)
PROT SERPL-MCNC: 7.5 G/DL (ref 6–8.4)
RBC # BLD AUTO: 4.36 M/UL (ref 4–5.4)
SODIUM SERPL-SCNC: 134 MMOL/L (ref 136–145)
WBC # BLD AUTO: 9.49 K/UL (ref 3.9–12.7)

## 2024-11-27 PROCEDURE — 85025 COMPLETE CBC W/AUTO DIFF WBC: CPT | Performed by: INTERNAL MEDICINE

## 2024-11-27 PROCEDURE — 86300 IMMUNOASSAY TUMOR CA 15-3: CPT | Performed by: INTERNAL MEDICINE

## 2024-11-27 PROCEDURE — 82378 CARCINOEMBRYONIC ANTIGEN: CPT | Performed by: INTERNAL MEDICINE

## 2024-11-27 PROCEDURE — 86300 IMMUNOASSAY TUMOR CA 15-3: CPT | Mod: 91 | Performed by: INTERNAL MEDICINE

## 2024-11-27 PROCEDURE — 36415 COLL VENOUS BLD VENIPUNCTURE: CPT | Performed by: INTERNAL MEDICINE

## 2024-11-27 PROCEDURE — 80053 COMPREHEN METABOLIC PANEL: CPT | Performed by: INTERNAL MEDICINE

## 2024-11-30 LAB
CANCER AG15-3 SERPL-ACNC: 21.9 U/ML (ref 0–25)
CANCER AG27-29 SERPL-ACNC: 33.8 U/ML (ref 0–38.6)

## 2024-12-02 NOTE — H&P (VIEW-ONLY)
"Subjective:       Patient ID: Liliana Hairston is a 79 y.o. female Body mass index is 24.29 kg/m².    Chief Complaint: Gastroesophageal Reflux    This patient is new to me.  Referring Provider: Aaareferral Self for stomach pain/indigestion.  Established patient of Dr. Fritz.     Has been occurring for months  + burping  + nausea  + chest pain/discomfort  Occurs with all meals - no specific trigger    Had been on zantac in the past but did not restart any OTC medications when symptoms started.     7/2019 OV with Dr [facility]     CC: Belching     HPI 73 y.o. female presents for evaluation of intermittent, mild, post-prandial belching worse after eating ice cream. She does frequently drink out of straws but denies sugar free substitutes or carbonated beverages. She has occasional heartburn which is improved with Zantac.  She continues to have intermittent "attacks" of what she assumes is diverticulitis, described as 1-2 days of acute, severe lower abdominal cramping associated with nausea. She takes Bentyl during these episodes which helps and then she feels well. Her last colonoscopy was in February 2018, notable for sigmoid diverticulosis.     Assessment:   73 y.o. female presents for evaluation of frequent belching especially after eating ice cream, as well as intermittent lower abdominal pain and nausea.      Plan:  1.Belching, ? Due to reflux vs lactose intolerance  -Avoid lactose and using straws  -Trial of Zantac BID x 14 days then may resume PRN use     2.Episodic lower abdominal pain and nausea, ? Uncomplicated diverticulitis vs intestinal spasm  -continue Bentyl as needed  -Instructed should pain become severe or have associated fevers/chills she should present to ED    2/2018 Colonoscopy with Dr Fritz  - The examination was otherwise normal on direct and   retroflexion views.   - No specimens collected.    -History of lower abdominal pain and change in bowel   habits, likely due " to diverticulitis that has now   resolved.         Review of Systems   Constitutional:  Positive for appetite change. Negative for activity change, fatigue, fever and unexpected weight change.   HENT:  Negative for sore throat and trouble swallowing.    Respiratory:  Positive for cough. Negative for shortness of breath.    Cardiovascular:  Positive for chest pain.   Gastrointestinal:  Positive for abdominal pain and nausea. Negative for abdominal distention, anal bleeding, blood in stool, constipation, diarrhea, rectal pain and vomiting.       No LMP recorded. Patient is postmenopausal.  Past Medical History:   Diagnosis Date    Breast cancer     Cancer     breast, right '97    Cataract     Headache      Past Surgical History:   Procedure Laterality Date    APPENDECTOMY      BREAST SURGERY  1997    right mastectomy    COLONOSCOPY N/A 2/21/2018    Procedure: COLONOSCOPY;  Surgeon: Cristel Fritz MD;  Location: [facility];  Service: Endoscopy;  Laterality: N/A;    EYE SURGERY      HYSTERECTOMY      LUNG SURGERY  1998    remove cyst on top of right lobe    right mastectomy  2015     Family History   Problem Relation Name Age of Onset    Cancer Mother      Colon cancer Mother      No Known Problems Father      Breast cancer Maternal Aunt 4     Cancer Maternal Aunt 4     ALS Sister 1     No Known Problems Brother 5     Diverticulitis Son 3     Heart disease Maternal Uncle 3     Alzheimer's disease Maternal Grandmother      Ovarian cancer Neg Hx       Social History     Tobacco Use    Smoking status: Never    Smokeless tobacco: Never   Substance Use Topics    Alcohol use: Yes     Alcohol/week: 4.0 standard drinks of alcohol     Types: 2 Glasses of wine, 2 Cans of beer per week     Comment: 2x per week wine or beer    Drug use: No     Wt Readings from Last 10 Encounters:   12/04/24 64.2 kg (141 lb 8.6 oz)   09/30/24 63.6 kg (140 lb 3.4 oz)   03/25/24 64.1 kg (141 lb 5 oz)   12/12/23 63 kg (139 lb)   11/30/23 63.3 kg  (139 lb 8.8 oz)   11/27/23 63 kg (138 lb 14.2 oz)   06/21/23 62 kg (136 lb 9.2 oz)   06/15/23 61.6 kg (135 lb 12.9 oz)   06/09/23 61.6 kg (135 lb 12.9 oz)   04/25/23 61.6 kg (135 lb 12.9 oz)     Lab Results   Component Value Date    WBC 9.49 11/27/2024    HGB 13.7 11/27/2024    HCT 40.9 11/27/2024    MCV 94 11/27/2024     11/27/2024     CMP  Sodium   Date Value Ref Range Status   11/27/2024 134 (L) 136 - 145 mmol/L Final   10/06/2017 137 134 - 144 mmol/L      Potassium   Date Value Ref Range Status   11/27/2024 4.3 3.5 - 5.1 mmol/L Final     Chloride   Date Value Ref Range Status   11/27/2024 99 95 - 110 mmol/L Final   10/06/2017 102 98 - 110 mmol/L      CO2   Date Value Ref Range Status   11/27/2024 27 23 - 29 mmol/L Final     Glucose   Date Value Ref Range Status   11/27/2024 108 70 - 110 mg/dL Final   10/06/2017 103 (H) 70 - 99 mg/dL      BUN   Date Value Ref Range Status   11/27/2024 8 8 - 23 mg/dL Final     Creatinine   Date Value Ref Range Status   11/27/2024 0.5 0.5 - 1.4 mg/dL Final   10/06/2017 0.50 (L) 0.60 - 1.40 mg/dL      Calcium   Date Value Ref Range Status   11/27/2024 9.9 8.7 - 10.5 mg/dL Final     Total Protein   Date Value Ref Range Status   11/27/2024 7.5 6.0 - 8.4 g/dL Final     Albumin   Date Value Ref Range Status   11/27/2024 4.3 3.5 - 5.2 g/dL Final   10/06/2017 4.4 3.1 - 4.7 g/dL      Total Bilirubin   Date Value Ref Range Status   11/27/2024 0.5 0.1 - 1.0 mg/dL Final     Comment:     For infants and newborns, interpretation of results should be based  on gestational age, weight and in agreement with clinical  observations.    Premature Infant recommended reference ranges:  Up to 24 hours.............<8.0 mg/dL  Up to 48 hours............<12.0 mg/dL  3-5 days..................<15.0 mg/dL  6-29 days.................<15.0 mg/dL       Alkaline Phosphatase   Date Value Ref Range Status   11/27/2024 156 (H) 55 - 135 U/L Final     AST   Date Value Ref Range Status   11/27/2024 39 10 - 40  "U/L Final     ALT   Date Value Ref Range Status   11/27/2024 165 (H) 10 - 44 U/L Final     Anion Gap   Date Value Ref Range Status   11/27/2024 8 8 - 16 mmol/L Final     eGFR if    Date Value Ref Range Status   04/05/2022 >60.0 >60 mL/min/1.73 m^2 Final     eGFR if non    Date Value Ref Range Status   04/05/2022 >60.0 >60 mL/min/1.73 m^2 Final     Comment:     Calculation used to obtain the estimated glomerular filtration  rate (eGFR) is the CKD-EPI equation.        No results found for: "AMYLASE"  No results found for: "LIPASE"  No results found for: "LIPASERES"  Lab Results   Component Value Date    TSH 1.467 11/14/2023       Reviewed prior medical records including radiology report of OV 2019 with Dr Fritz & endoscopy history (see surgical history).    Objective:      Physical Exam  Vitals and nursing note reviewed.   Constitutional:       General: She is not in acute distress.     Appearance: She is not ill-appearing.   HENT:      Head: Normocephalic and atraumatic.      Mouth/Throat:      Mouth: Mucous membranes are moist.      Pharynx: Oropharynx is clear.   Eyes:      Conjunctiva/sclera: Conjunctivae normal.   Cardiovascular:      Rate and Rhythm: Normal rate and regular rhythm.      Pulses: Normal pulses.   Pulmonary:      Effort: Pulmonary effort is normal. No respiratory distress.   Abdominal:      General: Abdomen is flat. Bowel sounds are normal. There is no distension.      Palpations: Abdomen is soft.      Tenderness: There is no abdominal tenderness.   Skin:     General: Skin is warm and dry.      Capillary Refill: Capillary refill takes 2 to 3 seconds.   Neurological:      Mental Status: She is alert and oriented to person, place, and time.         Assessment:       1. Gastroesophageal reflux disease, unspecified whether esophagitis present        Plan:       Gastroesophageal reflux disease, unspecified whether esophagitis present  -discussed about the different types " of medications used to treat reflux and how to use them, antacids can be used PRN for breakthrough heartburn symptoms by reducing stomach acid that is already produced, H2 blockers work by limiting the amount acid production, & PPI's work to block acid production and are taken daily, patient verbalized understanding.  -Educated patient on lifestyle modifications to help control/reduce reflux/abdominal pain including: avoid large meals, avoid eating within 2-3 hours of bedtime (avoid late night eating & lying down soon after eating), elevate head of bed if nocturnal symptoms are present, smoking cessation (if current smoker), & weight loss (if overweight).   -Educated to avoid known foods which trigger reflux symptoms & to minimize/avoid high-fat foods, chocolate, caffeine, citrus, alcohol, & tomato products.  -Advised to avoid/limit use of NSAID's, since they can cause GI upset, bleeding, and/or ulcers. If needed, take with food.     Start protonix and if needed pepcid   - schedule EGD, discussed procedure with patient, including risks and benefits, patient verbalized understanding    EGD to rule out esophagitis since patient reports severe chest pain that was ruled out as non-cardiac  -     pantoprazole (PROTONIX) 40 MG tablet; Take 1 tablet (40 mg total) by mouth once daily.  Dispense: 90 tablet; Refill: 3  -     famotidine (PEPCID) 40 MG tablet; Take 1 tablet (40 mg total) by mouth nightly as needed for Heartburn.  Dispense: 30 tablet; Refill: 11  -     sucralfate (CARAFATE) 1 gram tablet; Take 1 tablet (1 g total) by mouth 4 (four) times daily before meals and nightly. for 10 days  Dispense: 40 tablet; Refill: 0  -     Case Request Endoscopy: EGD (ESOPHAGOGASTRODUODENOSCOPY)      Follow up if symptoms worsen or fail to improve.      If no improvement in symptoms or symptoms worsen, call/follow-up at clinic or go to ER.       RUBI Wheeler, GOLD    Encounter includes face to face time and non-face to face  time preparing to see the patient (eg, review of tests), obtaining and/or reviewing separately obtained history, documenting clinical information in the electronic or other health record, independently interpreting results (not separately reported) and communicating results to the patient/family/caregiver, or care coordination (not separately reported).     A dictation software program was used for this note. Please expect some simple typographical errors in this note.

## 2024-12-02 NOTE — PROGRESS NOTES
"Subjective:       Patient ID: Liliana Hairston is a 79 y.o. female Body mass index is 24.29 kg/m².    Chief Complaint: Gastroesophageal Reflux    This patient is new to me.  Referring Provider: Aaareferral Self for stomach pain/indigestion.  Established patient of Dr. Fritz.     Has been occurring for months  + burping  + nausea  + chest pain/discomfort  Occurs with all meals - no specific trigger    Had been on zantac in the past but did not restart any OTC medications when symptoms started.     7/2019 OV with Dr [facility]     CC: Belching     HPI 73 y.o. female presents for evaluation of intermittent, mild, post-prandial belching worse after eating ice cream. She does frequently drink out of straws but denies sugar free substitutes or carbonated beverages. She has occasional heartburn which is improved with Zantac.  She continues to have intermittent "attacks" of what she assumes is diverticulitis, described as 1-2 days of acute, severe lower abdominal cramping associated with nausea. She takes Bentyl during these episodes which helps and then she feels well. Her last colonoscopy was in February 2018, notable for sigmoid diverticulosis.     Assessment:   73 y.o. female presents for evaluation of frequent belching especially after eating ice cream, as well as intermittent lower abdominal pain and nausea.      Plan:  1.Belching, ? Due to reflux vs lactose intolerance  -Avoid lactose and using straws  -Trial of Zantac BID x 14 days then may resume PRN use     2.Episodic lower abdominal pain and nausea, ? Uncomplicated diverticulitis vs intestinal spasm  -continue Bentyl as needed  -Instructed should pain become severe or have associated fevers/chills she should present to ED    2/2018 Colonoscopy with Dr Fritz  - The examination was otherwise normal on direct and   retroflexion views.   - No specimens collected.    -History of lower abdominal pain and change in bowel   habits, likely due " to diverticulitis that has now   resolved.         Review of Systems   Constitutional:  Positive for appetite change. Negative for activity change, fatigue, fever and unexpected weight change.   HENT:  Negative for sore throat and trouble swallowing.    Respiratory:  Positive for cough. Negative for shortness of breath.    Cardiovascular:  Positive for chest pain.   Gastrointestinal:  Positive for abdominal pain and nausea. Negative for abdominal distention, anal bleeding, blood in stool, constipation, diarrhea, rectal pain and vomiting.       No LMP recorded. Patient is postmenopausal.  Past Medical History:   Diagnosis Date    Breast cancer     Cancer     breast, right '97    Cataract     Headache      Past Surgical History:   Procedure Laterality Date    APPENDECTOMY      BREAST SURGERY  1997    right mastectomy    COLONOSCOPY N/A 2/21/2018    Procedure: COLONOSCOPY;  Surgeon: Cristel Fritz MD;  Location: [facility];  Service: Endoscopy;  Laterality: N/A;    EYE SURGERY      HYSTERECTOMY      LUNG SURGERY  1998    remove cyst on top of right lobe    right mastectomy  2015     Family History   Problem Relation Name Age of Onset    Cancer Mother      Colon cancer Mother      No Known Problems Father      Breast cancer Maternal Aunt 4     Cancer Maternal Aunt 4     ALS Sister 1     No Known Problems Brother 5     Diverticulitis Son 3     Heart disease Maternal Uncle 3     Alzheimer's disease Maternal Grandmother      Ovarian cancer Neg Hx       Social History     Tobacco Use    Smoking status: Never    Smokeless tobacco: Never   Substance Use Topics    Alcohol use: Yes     Alcohol/week: 4.0 standard drinks of alcohol     Types: 2 Glasses of wine, 2 Cans of beer per week     Comment: 2x per week wine or beer    Drug use: No     Wt Readings from Last 10 Encounters:   12/04/24 64.2 kg (141 lb 8.6 oz)   09/30/24 63.6 kg (140 lb 3.4 oz)   03/25/24 64.1 kg (141 lb 5 oz)   12/12/23 63 kg (139 lb)   11/30/23 63.3 kg  (139 lb 8.8 oz)   11/27/23 63 kg (138 lb 14.2 oz)   06/21/23 62 kg (136 lb 9.2 oz)   06/15/23 61.6 kg (135 lb 12.9 oz)   06/09/23 61.6 kg (135 lb 12.9 oz)   04/25/23 61.6 kg (135 lb 12.9 oz)     Lab Results   Component Value Date    WBC 9.49 11/27/2024    HGB 13.7 11/27/2024    HCT 40.9 11/27/2024    MCV 94 11/27/2024     11/27/2024     CMP  Sodium   Date Value Ref Range Status   11/27/2024 134 (L) 136 - 145 mmol/L Final   10/06/2017 137 134 - 144 mmol/L      Potassium   Date Value Ref Range Status   11/27/2024 4.3 3.5 - 5.1 mmol/L Final     Chloride   Date Value Ref Range Status   11/27/2024 99 95 - 110 mmol/L Final   10/06/2017 102 98 - 110 mmol/L      CO2   Date Value Ref Range Status   11/27/2024 27 23 - 29 mmol/L Final     Glucose   Date Value Ref Range Status   11/27/2024 108 70 - 110 mg/dL Final   10/06/2017 103 (H) 70 - 99 mg/dL      BUN   Date Value Ref Range Status   11/27/2024 8 8 - 23 mg/dL Final     Creatinine   Date Value Ref Range Status   11/27/2024 0.5 0.5 - 1.4 mg/dL Final   10/06/2017 0.50 (L) 0.60 - 1.40 mg/dL      Calcium   Date Value Ref Range Status   11/27/2024 9.9 8.7 - 10.5 mg/dL Final     Total Protein   Date Value Ref Range Status   11/27/2024 7.5 6.0 - 8.4 g/dL Final     Albumin   Date Value Ref Range Status   11/27/2024 4.3 3.5 - 5.2 g/dL Final   10/06/2017 4.4 3.1 - 4.7 g/dL      Total Bilirubin   Date Value Ref Range Status   11/27/2024 0.5 0.1 - 1.0 mg/dL Final     Comment:     For infants and newborns, interpretation of results should be based  on gestational age, weight and in agreement with clinical  observations.    Premature Infant recommended reference ranges:  Up to 24 hours.............<8.0 mg/dL  Up to 48 hours............<12.0 mg/dL  3-5 days..................<15.0 mg/dL  6-29 days.................<15.0 mg/dL       Alkaline Phosphatase   Date Value Ref Range Status   11/27/2024 156 (H) 55 - 135 U/L Final     AST   Date Value Ref Range Status   11/27/2024 39 10 - 40  "U/L Final     ALT   Date Value Ref Range Status   11/27/2024 165 (H) 10 - 44 U/L Final     Anion Gap   Date Value Ref Range Status   11/27/2024 8 8 - 16 mmol/L Final     eGFR if    Date Value Ref Range Status   04/05/2022 >60.0 >60 mL/min/1.73 m^2 Final     eGFR if non    Date Value Ref Range Status   04/05/2022 >60.0 >60 mL/min/1.73 m^2 Final     Comment:     Calculation used to obtain the estimated glomerular filtration  rate (eGFR) is the CKD-EPI equation.        No results found for: "AMYLASE"  No results found for: "LIPASE"  No results found for: "LIPASERES"  Lab Results   Component Value Date    TSH 1.467 11/14/2023       Reviewed prior medical records including radiology report of OV 2019 with Dr Fritz & endoscopy history (see surgical history).    Objective:      Physical Exam  Vitals and nursing note reviewed.   Constitutional:       General: She is not in acute distress.     Appearance: She is not ill-appearing.   HENT:      Head: Normocephalic and atraumatic.      Mouth/Throat:      Mouth: Mucous membranes are moist.      Pharynx: Oropharynx is clear.   Eyes:      Conjunctiva/sclera: Conjunctivae normal.   Cardiovascular:      Rate and Rhythm: Normal rate and regular rhythm.      Pulses: Normal pulses.   Pulmonary:      Effort: Pulmonary effort is normal. No respiratory distress.   Abdominal:      General: Abdomen is flat. Bowel sounds are normal. There is no distension.      Palpations: Abdomen is soft.      Tenderness: There is no abdominal tenderness.   Skin:     General: Skin is warm and dry.      Capillary Refill: Capillary refill takes 2 to 3 seconds.   Neurological:      Mental Status: She is alert and oriented to person, place, and time.         Assessment:       1. Gastroesophageal reflux disease, unspecified whether esophagitis present        Plan:       Gastroesophageal reflux disease, unspecified whether esophagitis present  -discussed about the different types " of medications used to treat reflux and how to use them, antacids can be used PRN for breakthrough heartburn symptoms by reducing stomach acid that is already produced, H2 blockers work by limiting the amount acid production, & PPI's work to block acid production and are taken daily, patient verbalized understanding.  -Educated patient on lifestyle modifications to help control/reduce reflux/abdominal pain including: avoid large meals, avoid eating within 2-3 hours of bedtime (avoid late night eating & lying down soon after eating), elevate head of bed if nocturnal symptoms are present, smoking cessation (if current smoker), & weight loss (if overweight).   -Educated to avoid known foods which trigger reflux symptoms & to minimize/avoid high-fat foods, chocolate, caffeine, citrus, alcohol, & tomato products.  -Advised to avoid/limit use of NSAID's, since they can cause GI upset, bleeding, and/or ulcers. If needed, take with food.     Start protonix and if needed pepcid   - schedule EGD, discussed procedure with patient, including risks and benefits, patient verbalized understanding    EGD to rule out esophagitis since patient reports severe chest pain that was ruled out as non-cardiac  -     pantoprazole (PROTONIX) 40 MG tablet; Take 1 tablet (40 mg total) by mouth once daily.  Dispense: 90 tablet; Refill: 3  -     famotidine (PEPCID) 40 MG tablet; Take 1 tablet (40 mg total) by mouth nightly as needed for Heartburn.  Dispense: 30 tablet; Refill: 11  -     sucralfate (CARAFATE) 1 gram tablet; Take 1 tablet (1 g total) by mouth 4 (four) times daily before meals and nightly. for 10 days  Dispense: 40 tablet; Refill: 0  -     Case Request Endoscopy: EGD (ESOPHAGOGASTRODUODENOSCOPY)      Follow up if symptoms worsen or fail to improve.      If no improvement in symptoms or symptoms worsen, call/follow-up at clinic or go to ER.       RUBI Wheeler, GOLD    Encounter includes face to face time and non-face to face  time preparing to see the patient (eg, review of tests), obtaining and/or reviewing separately obtained history, documenting clinical information in the electronic or other health record, independently interpreting results (not separately reported) and communicating results to the patient/family/caregiver, or care coordination (not separately reported).     A dictation software program was used for this note. Please expect some simple typographical errors in this note.

## 2024-12-04 ENCOUNTER — OFFICE VISIT (OUTPATIENT)
Dept: GASTROENTEROLOGY | Facility: CLINIC | Age: 79
End: 2024-12-04
Payer: MEDICARE

## 2024-12-04 VITALS
HEIGHT: 64 IN | SYSTOLIC BLOOD PRESSURE: 124 MMHG | HEART RATE: 60 BPM | WEIGHT: 141.56 LBS | BODY MASS INDEX: 24.17 KG/M2 | DIASTOLIC BLOOD PRESSURE: 63 MMHG

## 2024-12-04 DIAGNOSIS — K21.9 GASTROESOPHAGEAL REFLUX DISEASE, UNSPECIFIED WHETHER ESOPHAGITIS PRESENT: Primary | ICD-10-CM

## 2024-12-04 PROCEDURE — 1126F AMNT PAIN NOTED NONE PRSNT: CPT | Mod: CPTII,S$GLB,,

## 2024-12-04 PROCEDURE — 3074F SYST BP LT 130 MM HG: CPT | Mod: CPTII,S$GLB,,

## 2024-12-04 PROCEDURE — 3078F DIAST BP <80 MM HG: CPT | Mod: CPTII,S$GLB,,

## 2024-12-04 PROCEDURE — 99204 OFFICE O/P NEW MOD 45 MIN: CPT | Mod: S$GLB,,,

## 2024-12-04 PROCEDURE — 1101F PT FALLS ASSESS-DOCD LE1/YR: CPT | Mod: CPTII,S$GLB,,

## 2024-12-04 PROCEDURE — 3288F FALL RISK ASSESSMENT DOCD: CPT | Mod: CPTII,S$GLB,,

## 2024-12-04 PROCEDURE — 99999 PR PBB SHADOW E&M-EST. PATIENT-LVL II: CPT | Mod: PBBFAC,,,

## 2024-12-04 RX ORDER — FAMOTIDINE 40 MG/1
40 TABLET, FILM COATED ORAL NIGHTLY PRN
Qty: 30 TABLET | Refills: 11 | Status: SHIPPED | OUTPATIENT
Start: 2024-12-04 | End: 2025-12-04

## 2024-12-04 RX ORDER — SUCRALFATE 1 G/1
1 TABLET ORAL
Qty: 40 TABLET | Refills: 0 | Status: SHIPPED | OUTPATIENT
Start: 2024-12-04 | End: 2024-12-14

## 2024-12-04 RX ORDER — PANTOPRAZOLE SODIUM 40 MG/1
40 TABLET, DELAYED RELEASE ORAL DAILY
Qty: 90 TABLET | Refills: 3 | Status: SHIPPED | OUTPATIENT
Start: 2024-12-04 | End: 2025-12-04

## 2024-12-11 ENCOUNTER — ANESTHESIA (OUTPATIENT)
Dept: ENDOSCOPY | Facility: HOSPITAL | Age: 79
End: 2024-12-11
Payer: MEDICARE

## 2024-12-11 ENCOUNTER — ANESTHESIA EVENT (OUTPATIENT)
Dept: ENDOSCOPY | Facility: HOSPITAL | Age: 79
End: 2024-12-11
Payer: MEDICARE

## 2024-12-11 ENCOUNTER — HOSPITAL ENCOUNTER (OUTPATIENT)
Facility: HOSPITAL | Age: 79
Discharge: HOME | End: 2024-12-11
Attending: INTERNAL MEDICINE | Admitting: INTERNAL MEDICINE
Payer: MEDICARE

## 2024-12-11 DIAGNOSIS — K21.9 GERD (GASTROESOPHAGEAL REFLUX DISEASE): ICD-10-CM

## 2024-12-11 PROCEDURE — 88305 TISSUE EXAM BY PATHOLOGIST: CPT | Mod: TC,59 | Performed by: PATHOLOGY

## 2024-12-11 PROCEDURE — 27201012 HC FORCEPS, HOT/COLD, DISP: Performed by: INTERNAL MEDICINE

## 2024-12-11 PROCEDURE — 37000009 HC ANESTHESIA EA ADD 15 MINS: Performed by: INTERNAL MEDICINE

## 2024-12-11 PROCEDURE — 43239 EGD BIOPSY SINGLE/MULTIPLE: CPT | Mod: ,,, | Performed by: INTERNAL MEDICINE

## 2024-12-11 PROCEDURE — 25000003 PHARM REV CODE 250: Performed by: INTERNAL MEDICINE

## 2024-12-11 PROCEDURE — 43239 EGD BIOPSY SINGLE/MULTIPLE: CPT | Performed by: INTERNAL MEDICINE

## 2024-12-11 PROCEDURE — 37000008 HC ANESTHESIA 1ST 15 MINUTES: Performed by: INTERNAL MEDICINE

## 2024-12-11 PROCEDURE — 63600175 PHARM REV CODE 636 W HCPCS: Performed by: NURSE ANESTHETIST, CERTIFIED REGISTERED

## 2024-12-11 RX ORDER — LIDOCAINE HYDROCHLORIDE 20 MG/ML
INJECTION INTRAVENOUS
Status: DISCONTINUED | OUTPATIENT
Start: 2024-12-11 | End: 2024-12-11

## 2024-12-11 RX ORDER — OMEPRAZOLE 40 MG/1
CAPSULE, DELAYED RELEASE ORAL
Qty: 90 CAPSULE | Refills: 3 | Status: SHIPPED | OUTPATIENT
Start: 2024-12-11 | End: 2025-12-06

## 2024-12-11 RX ORDER — PROPOFOL 10 MG/ML
VIAL (ML) INTRAVENOUS
Status: DISCONTINUED | OUTPATIENT
Start: 2024-12-11 | End: 2024-12-11

## 2024-12-11 RX ORDER — SODIUM CHLORIDE 9 MG/ML
INJECTION, SOLUTION INTRAVENOUS CONTINUOUS
Status: DISCONTINUED | OUTPATIENT
Start: 2024-12-11 | End: 2024-12-11 | Stop reason: HOSPADM

## 2024-12-11 RX ADMIN — PROPOFOL 40 MG: 10 INJECTION, EMULSION INTRAVENOUS at 10:12

## 2024-12-11 RX ADMIN — PROPOFOL 60 MG: 10 INJECTION, EMULSION INTRAVENOUS at 10:12

## 2024-12-11 RX ADMIN — SODIUM CHLORIDE: 9 INJECTION, SOLUTION INTRAVENOUS at 10:12

## 2024-12-11 RX ADMIN — LIDOCAINE HYDROCHLORIDE 75 MG: 20 INJECTION, SOLUTION INTRAVENOUS at 10:12

## 2024-12-11 NOTE — ANESTHESIA POSTPROCEDURE EVALUATION
Anesthesia Post Evaluation    Patient: Liliana Hairston    Procedure(s) Performed: Procedure(s) (LRB):  EGD (ESOPHAGOGASTRODUODENOSCOPY) (N/A)    Final Anesthesia Type: general      Patient location during evaluation: PACU  Patient participation: Yes- Able to Participate  Level of consciousness: awake and alert  Post-procedure vital signs: reviewed and stable  Pain management: adequate  Airway patency: patent    PONV status at discharge: No PONV  Anesthetic complications: no      Cardiovascular status: hemodynamically stable  Respiratory status: unassisted and room air  Hydration status: euvolemic  Follow-up not needed.              Vitals Value Taken Time   /75 12/11/24 1057   Temp 36.4 °C (97.6 °F) 12/11/24 1041   Pulse 72 12/11/24 1100   Resp 42 12/11/24 1057   SpO2 96 % 12/11/24 1100   Vitals shown include unfiled device data.      Event Time   Out of Recovery 11:15:00         Pain/Rachel Score: Rachel Score: 10 (12/11/2024 10:50 AM)

## 2024-12-11 NOTE — ANESTHESIA PREPROCEDURE EVALUATION
12/11/2024  Liliana Hairston is a 79 y.o., female.      Pre-op Assessment    I have reviewed the Patient Summary Reports.     I have reviewed the Nursing Notes. I have reviewed the NPO Status.   I have reviewed the Medications.     Review of Systems  Anesthesia Hx:  No problems with previous Anesthesia                Social:  Non-Smoker       Hematology/Oncology:  Hematology Normal                       --  Cancer in past history:       Breast    right   surgery       EENT/Dental:  EENT/Dental Normal           Cardiovascular:                hyperlipidemia                               Pulmonary:  Pulmonary Normal                       Musculoskeletal:  Arthritis        Arthritis          Neurological:      Headaches      Dx of Headaches   Arthritis                               Physical Exam  General: Well nourished, Cooperative, Alert and Oriented    Airway:  Mallampati: II   Mouth Opening: Normal  TM Distance: Normal  Tongue: Normal  Neck ROM: Normal ROM    Dental:  Intact    Chest/Lungs:  Normal Respiratory Rate    Heart:  Rate: Normal        Anesthesia Plan  Type of Anesthesia, risks & benefits discussed:    Anesthesia Type: Gen Natural Airway  Intra-op Monitoring Plan: Standard ASA Monitors  Induction:  IV  Informed Consent: Informed consent signed with the Patient and all parties understand the risks and agree with anesthesia plan.  All questions answered.   ASA Score: 2  Day of Surgery Review of History & Physical: H&P Update referred to the surgeon/provider.    Ready For Surgery From Anesthesia Perspective.     .

## 2024-12-11 NOTE — [employer]ATION PATIENT INSTRUCTIONS
Discharge Summary/Instructions after an Endoscopic Procedure  Patient Name: Liliana Hairston  Patient MRN: [id number]  Patient YOB: 1945 Wednesday, December 11, 2024  Cristel Fritz MD  Dear patient,  As a result of recent federal legislation (The Federal Cures Act), you may   receive lab or pathology results from your procedure in your [facility]   account before your physician is able to contact you. Your physician or   their representative will relay the results to you with their   recommendations at their soonest availability.  Thank you,  RESTRICTIONS:  During your procedure today, you received medications for sedation.  These   medications may affect your judgment, balance and coordination.  Therefore,   for 24 hours, you have the following restrictions:   - DO NOT drive a car, operate machinery, make legal/financial decisions,   sign important papers or drink alcohol.    ACTIVITY:  Today: no heavy lifting, straining or running due to procedural   sedation/anesthesia.  The following day: return to full activity including work.  DIET:  Eat and drink normally unless instructed otherwise.     TREATMENT FOR COMMON SIDE EFFECTS:  - Mild abdominal pain, nausea, belching, bloating or excessive gas:  rest,   eat lightly and use a heating pad.  - Sore Throat: treat with throat lozenges and/or gargle with warm salt   water.  - Because air was used during the procedure, expelling large amounts of air   from your rectum or belching is normal.  - If a bowel prep was taken, you may not have a bowel movement for 1-3 days.    This is normal.  SYMPTOMS TO WATCH FOR AND REPORT TO YOUR PHYSICIAN:  1. Abdominal pain or bloating, other than gas cramps.  2. Chest pain.  3. Back pain.  4. Signs of infection such as: chills or fever occurring within 24 hours   after the procedure.  5. Rectal bleeding, which would show as bright red, maroon, or black stools.   (A tablespoon of blood from the rectum is not  serious, especially if   hemorrhoids are present.)  6. Vomiting.  7. Weakness or dizziness.  GO DIRECTLY TO THE NEAREST EMERGENCY ROOM IF YOU HAVE ANY OF THE FOLLOWING:      Difficulty breathing              Chills and/or fever over 101 F   Persistent vomiting and/or vomiting blood   Severe abdominal pain   Severe chest pain   Black, tarry stools   Bleeding- more than one tablespoon   Any other symptom or condition that you feel may need urgent attention  Your doctor recommends these additional instructions:  If any biopsies were taken, your doctors clinic will contact you in 1 to 2   weeks with any results.  - Await pathology results.   - Discharge patient to home (with escort).   - Patient has a contact number available for emergencies.  The signs and   symptoms of potential delayed complications were discussed with the   patient.  Return to normal activities tomorrow.  Written discharge   instructions were provided to the patient.   - Resume previous diet.   - PPI BID x 8 weeks then daily  For questions, problems or results please call your physician - Cristel Fritz MD at Work:  [phone].  [facility] PHONE NUMBER: [phone]  IF A COMPLICATION OR EMERGENCY SITUATION ARISES AND YOU ARE UNABLE TO REACH   YOUR PHYSICIAN - GO DIRECTLY TO THE EMERGENCY ROOM.  Cristel Fritz MD  12/11/2024 10:39:24 AM  This report has been verified and signed electronically.  Dear patient,  As a result of recent federal legislation (The Federal Cures Act), you may   receive lab or pathology results from your procedure in your [facility]   account before your physician is able to contact you. Your physician or   their representative will relay the results to you with their   recommendations at their soonest availability.  Thank you,  PROVATION

## 2024-12-11 NOTE — TRANSFER OF CARE
"Anesthesia Transfer of Care Note    Patient: Liliana Hairston    Procedure(s) Performed: Procedure(s) (LRB):  EGD (ESOPHAGOGASTRODUODENOSCOPY) (N/A)    Patient location: PACU    Anesthesia Type: general    Transport from OR: Transported from OR on room air with adequate spontaneous ventilation    Post pain: adequate analgesia    Post assessment: no apparent anesthetic complications and tolerated procedure well    Post vital signs: stable    Level of consciousness: sedated and responds to stimulation    Nausea/Vomiting: no nausea/vomiting    Complications: none    Transfer of care protocol was followed      Last vitals: Visit Vitals  BP (!) 184/86 (BP Location: Left arm, Patient Position: Lying)   Pulse 70   Temp 36.7 °C (98.1 °F) (Skin)   Resp 16   Ht 5' 4" (1.626 m)   Wt 62.6 kg (138 lb)   SpO2 97%   Breastfeeding No   BMI 23.69 kg/m²     "

## 2024-12-12 ENCOUNTER — TELEPHONE (OUTPATIENT)
Dept: GASTROENTEROLOGY | Facility: CLINIC | Age: 79
End: 2024-12-12
Payer: MEDICARE

## 2024-12-12 VITALS
BODY MASS INDEX: 23.56 KG/M2 | SYSTOLIC BLOOD PRESSURE: 135 MMHG | WEIGHT: 138 LBS | OXYGEN SATURATION: 97 % | HEIGHT: 64 IN | DIASTOLIC BLOOD PRESSURE: 75 MMHG | RESPIRATION RATE: 16 BRPM | HEART RATE: 76 BPM | TEMPERATURE: 98 F

## 2024-12-12 NOTE — TELEPHONE ENCOUNTER
----- Message from Karla sent at 12/12/2024 12:59 PM CST -----  Contact: Hina from [employer]  Type:  Pharmacy Calling to Clarify an RX    Name of Caller:  Hina from [facility]    Pharmacy Name:      [id number] - [place], [place] - [address] AT [facility] [address] [facility] [address]  [address] [id number]  Phone: [phone] Fax: [phone]    Prescription Name:  omeprazole (PRILOSEC) 40 MG capsule     What do they need to clarify?:  Directions and dosage    Best Call Back Number:  [phone] - Hina    Additional Information:   States she received one script and needs to see if she should divide it into two scripts - states she needs to discuss options for dosage/directions - please call - thank you

## 2024-12-12 NOTE — TELEPHONE ENCOUNTER
Releonned call to Hina at [facility]. Clarification of Omeprazole medication. She stated to make it easier on the patient they are going to give her one prescription omeprazole 40mg BID with 0 refills. And then a different prescription for Omeprazole 40 mg daily with 3 refills. No further issues noted,

## 2025-01-21 DIAGNOSIS — G44.221 CHRONIC TENSION-TYPE HEADACHE, INTRACTABLE: ICD-10-CM

## 2025-01-21 RX ORDER — NORTRIPTYLINE HYDROCHLORIDE 10 MG/1
CAPSULE ORAL
Qty: 60 CAPSULE | Refills: 11 | OUTPATIENT
Start: 2025-01-21

## 2025-02-21 ENCOUNTER — OFFICE VISIT (OUTPATIENT)
Dept: NEUROLOGY | Facility: CLINIC | Age: 80
End: 2025-02-21
Payer: MEDICARE

## 2025-02-21 VITALS
WEIGHT: 144.06 LBS | HEIGHT: 64 IN | DIASTOLIC BLOOD PRESSURE: 75 MMHG | BODY MASS INDEX: 24.59 KG/M2 | SYSTOLIC BLOOD PRESSURE: 128 MMHG | HEART RATE: 67 BPM | TEMPERATURE: 97 F | RESPIRATION RATE: 17 BRPM

## 2025-02-21 DIAGNOSIS — G44.221 CHRONIC TENSION-TYPE HEADACHE, INTRACTABLE: ICD-10-CM

## 2025-02-21 RX ORDER — NORTRIPTYLINE HYDROCHLORIDE 10 MG/1
10 CAPSULE ORAL NIGHTLY
Qty: 90 CAPSULE | Refills: 3 | Status: SHIPPED | OUTPATIENT
Start: 2025-02-21

## 2025-02-21 NOTE — PROGRESS NOTES
Date of service: 2/21/2025  Referring provider: No ref. provider found    Subjective:      Chief complaint: Headache       Patient ID: Liliana Hairston is a 79 y.o. female with bilateral hearing loss, personal history of breast cancer who presents for follow up of headache    History of Present Illness    INTERVAL HISTORY 2/21/25    Last visit was over 18 months ago and at that time she was doing well.    Today she reports she is doing well. Current pain 2 with range 1-5. She has two headache days per week. She takes tylenol 0-1 days per week. She is interested in coming off nortriptyline. Otherwise information below is reviewed and verified with no changes made     INTERVAL HISTORY 6/21/23    Last visit was six months ago and at that time she was doing better.    Today she reports she is doing well. Headaches occur front, forehead. Current pain 2 with range 0-8. She has headaches 2-3 days per week. She takes tylenol occasionally. She has had four nosebleeds since winter. Once in the shower, once while folding clothes.  Otherwise information below is reviewed and verified with no changes made     INTERVAL HISTORY 12/20/22    Last visit was three months ago with Dr. Gage and at that time she was doing better. Plan was to decrease nortriptyline due to side effects.    Today she reports she is a little better. She reports a few headache days per month. Typically headaches are weather related. No longer having severe headaches at night. Current head pain 0 with range 0-3. She takes tylenol which has been effective.     ORIGINAL HEADACHE HISTORY - from 1/2021 with Dr. Gage  Age at onset and course over time: whole life (more frequent x2 year)  Location: left temple  Character: dull/pressure, and less throbbing  Worse during evenings   Intensity: 1-3 /10 on average and occasionally 9/10 (<1 time per month)  Frequency: daily  Duration: constant  Aura: [place] with only intense headaches (since young age, improved over  the years, still having it every few months)  Associated symptoms: None, denies any light/sound/smell sensitivity, denies any physical activity aggravating headache or avoiding physical activity due to worsening headache  Precipitating factors: not unknown  Alleviating factors: medications  Aggravating factors: sneezing, sleep disturbance  Family history of headache: No  ER visits: 0   Sleep: no history of ADRIANO. Wakes up twice for bathroom. 6-7 hours of sleep/night    Current acute treatment:  Tizanidine    Current prevention:  Nortriptyline - 20 mg QHS    Previously tried/failed acute treatment:  None    Previously tried/failed preventative treatment:  None     Review of patient's allergies indicates:  No Known Allergies  Current Outpatient Medications   Medication Sig Dispense Refill    (Magic mouthwash) 1:1:1 diphenhydrAMINE(Benadryl) 12.5mg/5ml liq, aluminum & magnesium hydroxide-simethicone (Maalox), LIDOcaine viscous 2% Swish and spit 5 mLs 4 (four) times daily as needed (sore throat). for mouth sores 360 mL 0    albuterol (PROVENTIL/VENTOLIN HFA) 90 mcg/actuation inhaler Inhale 2 puffs into the lungs 4 (four) times daily. 18 g 0    anastrozole (ARIMIDEX) 1 mg Tab Take 1 tablet (1 mg total) by mouth once daily. 90 tablet 3    CALCIUM CARBONATE/VITAMIN D3 (CALCIUM 500 + D ORAL) Take 1 each by mouth daily as needed.       dicyclomine (BENTYL) 20 mg tablet Take 1 tablet (20 mg total) by mouth 3 (three) times daily as needed (stomache). 90 tablet 1    fluocinolone acetonide oiL 0.01 % Drop 3-4 drops to the affected ear(s) twice daily no more than a week at a time as needed for itching and scaling 20 mL 3    fluticasone propionate (FLONASE) 50 mcg/actuation nasal spray 1 spray (50 mcg total) by Each Nostril route once daily. 18 mL 4    magnesium 30 mg Tab Take 400 mg by mouth once.      meloxicam (MOBIC) 15 MG tablet Take 1 tablet (15 mg total) by mouth daily as needed for Pain. 90 tablet 3    montelukast  (SINGULAIR) 10 mg tablet Take 1 tablet (10 mg total) by mouth every evening. 90 tablet 3    multivitamin (THERAGRAN) per tablet Take 1 tablet by mouth once daily.      omega-3 fatty acids/fish oil (FISH OIL-OMEGA-3 FATTY ACIDS) 300-1,000 mg capsule Take by mouth once daily.      oxymetazoline (AFRIN, OXYMETAZOLINE,) 0.05 % nasal spray 2 spray tid x 3 days for severe congestion or bleeding 30 mL 2    triamcinolone acetonide 0.1% (KENALOG) 0.1 % cream Apply topically daily as needed.   3    nortriptyline (PAMELOR) 10 MG capsule Take 1 capsule (10 mg total) by mouth every evening. 90 capsule 3    omeprazole (PRILOSEC) 40 MG capsule Take 1 capsule (40 mg total) by mouth 2 (two) times daily before meals for 60 days, THEN 1 capsule (40 mg total) every morning. (Patient not taking: Reported on 2/21/2025) 90 capsule 3    promethazine-dextromethorphan (PROMETHAZINE-DM) 6.25-15 mg/5 mL Syrp TAKE 5 TO 10 ML BY MOUTH EVERY 8 HOURS AS NEEDED FOR COUGH. MAY CAUSE DROWSINESS (Patient not taking: Reported on 2/21/2025)      promethazine-dextromethorphan (PROMETHAZINE-DM) 6.25-15 mg/5 mL Syrp Take 5 mLs by mouth 4 (four) times daily as needed (cough). (Patient not taking: Reported on 2/21/2025) 240 mL 0     Current Facility-Administered Medications   Medication Dose Route Frequency Provider Last Rate Last Admin    diphenhydrAMINE injection 25 mg  25 mg Intravenous Once PRN Karthik Ford MD           Past Medical History  Past Medical History:   Diagnosis Date    Breast cancer     Cancer     breast, right '97    Cataract     Headache        Past Surgical History  Past Surgical History:   Procedure Laterality Date    APPENDECTOMY      BREAST SURGERY  1997    right mastectomy    COLONOSCOPY N/A 2/21/2018    Procedure: COLONOSCOPY;  Surgeon: Cristel Fritz MD;  Location: [facility];  Service: Endoscopy;  Laterality: N/A;    ESOPHAGOGASTRODUODENOSCOPY N/A 12/11/2024    Procedure: EGD (ESOPHAGOGASTRODUODENOSCOPY);  Surgeon: Catrina  Cristel BOSE MD;  Location: [facility];  Service: Endoscopy;  Laterality: N/A;    EYE SURGERY      HYSTERECTOMY      LUNG SURGERY  1998    remove cyst on top of right lobe    right mastectomy  2015       Family History  Family History   Problem Relation Name Age of Onset    Cancer Mother      Colon cancer Mother      No Known Problems Father      Breast cancer Maternal Aunt 4     Cancer Maternal Aunt 4     ALS Sister 1     No Known Problems Brother 5     Diverticulitis Son 3     Heart disease Maternal Uncle 3     Alzheimer's disease Maternal Grandmother      Ovarian cancer Neg Hx         Social History  Social History     Socioeconomic History    Marital status:    Tobacco Use    Smoking status: Never    Smokeless tobacco: Never   Substance and Sexual Activity    Alcohol use: Yes     Alcohol/week: 4.0 standard drinks of alcohol     Types: 2 Glasses of wine, 2 Cans of beer per week     Comment: 2x per week wine or beer    Drug use: No     Social Drivers of Health     Financial Resource Strain: Low Risk  (2/20/2025)    Overall Financial Resource Strain (CARDIA)     Difficulty of Paying Living Expenses: Not hard at all   Food Insecurity: No Food Insecurity (2/20/2025)    Hunger Vital Sign     Worried About Running Out of Food in the Last Year: Never true     Ran Out of Food in the Last Year: Never true   Transportation Needs: No Transportation Needs (2/20/2025)    PRAPARE - Transportation     Lack of Transportation (Medical): No     Lack of Transportation (Non-Medical): No   Physical Activity: Insufficiently Active (2/20/2025)    Exercise Vital Sign     Days of Exercise per Week: 2 days     Minutes of Exercise per Session: 60 min   Stress: No Stress Concern Present (2/20/2025)    [employer] - Occupational Stress Questionnaire     Feeling of Stress : Not at all   Housing Stability: Low Risk  (2/20/2025)    Housing Stability Vital Sign     Unable to Pay for Housing in the Last Year: No      Number of Times Moved in the Last Year: 0     Homeless in the Last Year: No        Objective:        Vitals:    02/21/25 0853   BP: 128/75   Pulse: 67   Resp: 17   Temp: 97.3 °F (36.3 °C)         Body mass index is 24.73 kg/m².    2/21/25  Constitutional:   She appears well-developed and well-nourished. She is well groomed     Neurological Exam:  General: well-developed, well-nourished, no distress  Mental status: Awake and alert  Speech language: No dysarthria or aphasia on conversation  Cranial nerves: Face symmetric  Motor: Moves all extremities well  Coordination: No ataxia. No tremor.    Data Review:     I have personally reviewed the referring provider's notes, labs, & imaging made available to me today.      RADIOLOGY STUDIES:  I have personally reviewed the pertinent images performed.       Results for orders placed or performed during the hospital encounter of 01/07/21   MRI Brain W WO Contrast    Narrative    EXAMINATION:  MRI BRAIN W WO CONTRAST    CLINICAL HISTORY:  new onset daily headache, history of breast CA;.  Headache, unspecified    TECHNIQUE:  Multiplanar multisequence MR imaging of the brain was performed before and after the administration of 6 mL Gadavist intravenous contrast.  Diffusion-weighted imaging was performed.  ADC map was generated.    COMPARISON:  None.    FINDINGS:  Intracranial compartment:    There is no acute or significant abnormality.  There is no intracranial hemorrhage.  There is no mass or mass effect.  There are no regions of restricted diffusion to suggest acute infarction.  There is only mild volume loss.  There is no hydrocephalus or midline shift.  There is a mild burden of periventricular and scattered subcortical white matter FLAIR and T2 hyperintense signal.  These findings are nonspecific but in a patient of this age, likely reflect sequelae of chronic small vessel disease.  There is no abnormal enhancement in the brain.  There is no abnormal extra-axial fluid  collection.  The basilar cisterns are open.  Flow voids indicating patency are present in the major vessels at the base of the brain.  The cerebellar tonsils are normal position.  Sellar structures are normal.  The patient has had bilateral lens replacement surgery.  Otherwise, the orbits are normal.    Skull/extracranial contents:    Baseline marrow signal is normal.  The paranasal sinuses and mastoid air cells are clear.      Impression    1. There is no acute or significant abnormality.  There is mild volume loss and nonspecific white matter change.  There is no intracranial hemorrhage, mass, mass effect, acute infarction or abnormal enhancement.      Electronically signed by: Kevin Garzon MD  Date:    01/07/2021  Time:    13:58       Lab Results   Component Value Date     (L) 11/27/2024     10/06/2017    K 4.3 11/27/2024    CL 99 11/27/2024     10/06/2017    CO2 27 11/27/2024    BUN 8 11/27/2024    CREATININE 0.5 11/27/2024    CREATININE 0.50 (L) 10/06/2017     11/27/2024     (H) 10/06/2017    HGBA1C 5.3 11/14/2023    AST 39 11/27/2024     (H) 11/27/2024    ALBUMIN 4.3 11/27/2024    ALBUMIN 4.4 10/06/2017    PROT 7.5 11/27/2024    BILITOT 0.5 11/27/2024    CHOL 182 03/11/2024    HDL 57 03/11/2024    LDLCALC 94.8 03/11/2024    TRIG 151 (H) 03/11/2024       Lab Results   Component Value Date    WBC 9.49 11/27/2024    HGB 13.7 11/27/2024    HCT 40.9 11/27/2024    MCV 94 11/27/2024     11/27/2024       Lab Results   Component Value Date    TSH 1.467 11/14/2023           Assessment & Plan:       Problem List Items Addressed This Visit    None  Visit Diagnoses         Chronic tension-type headache, intractable        Overall doing well. Will try to wean off nortriptyline. OK to continue tylenol PRN    Relevant Medications    nortriptyline (PAMELOR) 10 MG capsule                    Please call our clinic at [phone] or send a message on the [employer] portal if there  are any changes to the plan described below, for example,if you are not contacted for the requested tests, referral(s) within one week, if you are unable to receive the medications prescribed, or if you feel you need to change the treatment course for any reason.     TESTING:  -- none    REFERRALS:  -- none    PREVENTION (use daily regardless of headache):  -- continue magnesium   -- WEAN nortriptyline as follows:  Decrease to 10 mg (1 pill) at bedtime for 1-2 weeks. If no increase in headaches, you can stop.    AS-NEEDED TREATMENT (use total no more than 10 days per month unless otherwise stated):  -- continue tylenol     Follow up in about 2 months (around 4/21/2025).    Deborah Suggs, NP

## 2025-02-21 NOTE — PATIENT INSTRUCTIONS
Please call our clinic at [phone] or send a message on the [employer] portal if there are any changes to the plan described below, for example,if you are not contacted for the requested tests, referral(s) within one week, if you are unable to receive the medications prescribed, or if you feel you need to change the treatment course for any reason.     TESTING:  -- none    REFERRALS:  -- none    PREVENTION (use daily regardless of headache):  -- continue magnesium   -- WEAN nortriptyline as follows:  Decrease to 10 mg (1 pill) at bedtime for 1-2 weeks. If no increase in headaches, you can stop.    AS-NEEDED TREATMENT (use total no more than 10 days per month unless otherwise stated):  -- continue tylenol

## 2025-03-27 NOTE — ANESTHESIA POSTPROCEDURE EVALUATION
"Anesthesia Post Evaluation    Patient: Liliana Hairston    Procedure(s) Performed: Procedure(s) (LRB):  COLONOSCOPY (N/A)    Final Anesthesia Type: general  Patient location during evaluation: PACU  Patient participation: Yes- Able to Participate  Level of consciousness: awake and alert  Post-procedure vital signs: reviewed and stable  Pain management: adequate  Airway patency: patent  PONV status at discharge: No PONV  Anesthetic complications: no      Cardiovascular status: blood pressure returned to baseline and hemodynamically stable  Respiratory status: unassisted  Hydration status: euvolemic  Follow-up not needed.        Visit Vitals  BP (!) 160/69   Pulse 83   Temp 36.2 °C (97.1 °F) (Temporal)   Resp (!) 22   Ht 5' 4" (1.626 m)   Wt 65.8 kg (145 lb)   SpO2 100%   Breastfeeding? No   BMI 24.89 kg/m²       Pain/Rachel Score: Pain Assessment Performed: Yes (2/21/2018 10:34 AM)  Presence of Pain: denies (2/21/2018  9:17 AM)      " Subjective   Patient ID: Arsalan Varela is a 85 y.o. male who presents for Medicare Annual Wellness Visit Subsequent, Hypertension, Hyperlipidemia, Atrial Fibrillation, Panlobular emphysema , and Malignant neoplasm of urinary bladder.    Hypertension: The patient is here for an evaluation of elevated blood pressure. The patient is trying to follow a low-salt diet. He is adherent to a low salt diet. Blood pressure is well controlled at home. The patient is compliant with medications. Patient denies any side effects to the medications.     Hyperlipidemia: The patient is presenting today for a follow up of hyperlipidemia. The patient is compliant with medications. Patient denies any side effects to the medications.     Atrial Fibrillation  The patient presents for an evaluation of Atrial Fibrillation. The patient is compliant with medications. Patient denies any side effects to the medications. Patient states sometimes he notices irregular heat beats but it does not normally bother him. The patient is anticoagulated on Xarelto 15mg.    Emphysema: The patient is presenting today for a follow up of emphysema. Patient denies any issues with breathing.     Malignant neoplasm of urinary bladder: Patient states he is doing okay. It has become more uncomfortable than painful. He is schedule to meet this week to discuss the potential of surgery.     The patients living will is active. His POA is wife, back-up POA is daughter Alaina.  The patients current code status is FULL CODE. The patient does not want to linger on machines. .    Encounter for subsequent AWV: Medicare wellness visit done, patient is in satisfactory living circumstances where the patient's needs are met.  This patient is advised to develop or update their living will and provide us a copy for the chart.        Review of Systems   All other systems reviewed and are negative.      Patient Care Team:  Carlos Stephens MD as PCP - General  Carlos Stephens MD as  "PCP - [employer]vantage PCP  José Ceballos MD as Cardiologist (Interventional Cardiology)     Objective   /66   Pulse 53   Temp 36.2 °C (97.1 °F)   Resp 12   Ht 1.753 m (5' 9\")   Wt 82.9 kg (182 lb 12.8 oz)   SpO2 97%   BMI 26.99 kg/m²     Physical Exam  Vitals reviewed.   Constitutional:       Appearance: Normal appearance.   Neck:      Vascular: No carotid bruit.   Cardiovascular:      Rate and Rhythm: Normal rate and regular rhythm.      Pulses: Normal pulses.      Heart sounds: Normal heart sounds.   Pulmonary:      Effort: Pulmonary effort is normal. No respiratory distress.      Breath sounds: Normal breath sounds. No wheezing.   Abdominal:      General: There is no distension.      Palpations: Abdomen is soft. There is no mass.      Tenderness: There is no abdominal tenderness. There is no right CVA tenderness, left CVA tenderness, guarding or rebound.   Musculoskeletal:      Cervical back: Normal range of motion and neck supple. No rigidity.      Right lower leg: No edema.      Left lower leg: No edema.   Lymphadenopathy:      Cervical: No cervical adenopathy.   Neurological:      Mental Status: He is alert.             Assessment/Plan   Problem List Items Addressed This Visit       HTN (hypertension), benign     This condition  is well controlled, continue with current medications.           Relevant Orders    CBC and Auto Differential    Comprehensive Metabolic Panel    Magnesium    Follow Up In [facility] - PCP - Established    Longstanding persistent atrial fibrillation (Multi)     This condition is stable, continue with current treatment.         Malignant neoplasm of urinary bladder, unspecified site (Multi)     This condition  is well controlled, continue with current medications.           Mixed hyperlipidemia     This condition  is well controlled, continue with current medications.           Relevant Orders    Lipid Panel    Encounter for annual wellness " exam in Medicare patient - Primary     Medicare wellness visit done, patient is in satisfactory living circumstances where the patient's needs are met.  This patient is advised to develop or update their living will and provide us a copy for the chart.         Panlobular emphysema (Multi)     This condition  is well controlled, continue with current medications.           Relevant Orders    Referral to Clinical Pharmacy       Follow up in: 6 month(s) or sooner if needed with labs prior.    Scribe Attestation  By signing my name below, Sue WALL Scribe   attest that this documentation has been prepared under the direction and in the presence of Carlos Stephens MD.  Carlos WALL MD, personally performed the services described in the documentation as scribed by Sue Keane in my presence and confirm that it is both accurate and complete.

## 2025-04-08 ENCOUNTER — OFFICE VISIT (OUTPATIENT)
Dept: PRIMARY CARE CLINIC | Facility: CLINIC | Age: 80
End: 2025-04-08
Payer: MEDICARE

## 2025-04-08 ENCOUNTER — TELEPHONE (OUTPATIENT)
Dept: PRIMARY CARE CLINIC | Facility: CLINIC | Age: 80
End: 2025-04-08
Payer: MEDICARE

## 2025-04-08 VITALS
DIASTOLIC BLOOD PRESSURE: 58 MMHG | SYSTOLIC BLOOD PRESSURE: 110 MMHG | TEMPERATURE: 99 F | BODY MASS INDEX: 24.39 KG/M2 | OXYGEN SATURATION: 98 % | HEIGHT: 64 IN | WEIGHT: 142.88 LBS | HEART RATE: 73 BPM

## 2025-04-08 DIAGNOSIS — R05.1 ACUTE COUGH: Primary | ICD-10-CM

## 2025-04-08 DIAGNOSIS — J45.51 SEVERE PERSISTENT ASTHMA WITH ALLERGIC RHINITIS WITH ACUTE EXACERBATION: ICD-10-CM

## 2025-04-08 LAB
CTP QC/QA: YES
CTP QC/QA: YES
POC MOLECULAR INFLUENZA A AGN: NEGATIVE
POC MOLECULAR INFLUENZA B AGN: NEGATIVE
SARS-COV-2 RDRP RESP QL NAA+PROBE: NEGATIVE

## 2025-04-08 PROCEDURE — 87502 INFLUENZA DNA AMP PROBE: CPT | Mod: QW,S$GLB,, | Performed by: NURSE PRACTITIONER

## 2025-04-08 PROCEDURE — 3078F DIAST BP <80 MM HG: CPT | Mod: CPTII,S$GLB,, | Performed by: NURSE PRACTITIONER

## 2025-04-08 PROCEDURE — 99213 OFFICE O/P EST LOW 20 MIN: CPT | Mod: S$GLB,,, | Performed by: NURSE PRACTITIONER

## 2025-04-08 PROCEDURE — 3074F SYST BP LT 130 MM HG: CPT | Mod: CPTII,S$GLB,, | Performed by: NURSE PRACTITIONER

## 2025-04-08 PROCEDURE — 1126F AMNT PAIN NOTED NONE PRSNT: CPT | Mod: CPTII,S$GLB,, | Performed by: NURSE PRACTITIONER

## 2025-04-08 PROCEDURE — 87635 SARS-COV-2 COVID-19 AMP PRB: CPT | Mod: QW,S$GLB,, | Performed by: NURSE PRACTITIONER

## 2025-04-08 PROCEDURE — 1159F MED LIST DOCD IN RCRD: CPT | Mod: CPTII,S$GLB,, | Performed by: NURSE PRACTITIONER

## 2025-04-08 PROCEDURE — 1101F PT FALLS ASSESS-DOCD LE1/YR: CPT | Mod: CPTII,S$GLB,, | Performed by: NURSE PRACTITIONER

## 2025-04-08 PROCEDURE — 99999 PR PBB SHADOW E&M-EST. PATIENT-LVL III: CPT | Mod: PBBFAC,,, | Performed by: NURSE PRACTITIONER

## 2025-04-08 PROCEDURE — 3288F FALL RISK ASSESSMENT DOCD: CPT | Mod: CPTII,S$GLB,, | Performed by: NURSE PRACTITIONER

## 2025-04-08 RX ORDER — FLUTICASONE PROPIONATE 50 MCG
1 SPRAY, SUSPENSION (ML) NASAL DAILY
Qty: 18 ML | Refills: 4 | Status: SHIPPED | OUTPATIENT
Start: 2025-04-08

## 2025-04-08 NOTE — TELEPHONE ENCOUNTER
----- Message from [facility] sent at 4/8/2025  9:40 AM CDT -----  Type:  Same Day Appointment RequestCaller is requesting a same day appointment.  Caller declined first available appointment listed below.  Name of Caller:  Pt When is the first available appointment?  NA Symptoms:  would like to be tested for covid Best Call Back Number:  [phone] Information:   Pt recently came back from a cruise. Pls call back Symptoms: Sore Throat, Sinus SymptomsOutcome: Schedule an appointment to be seen within 24 hours.Reason: Caller denied all higher acuity questions

## 2025-04-08 NOTE — PROGRESS NOTES
"Subjective:       Patient ID: Liliana Hairston is a 79 y.o. female.    Chief Complaint: Nasal Congestion    Muscle Pain  This is a new problem. The current episode started yesterday. The problem occurs constantly. The problem has been unchanged. Associated symptoms include coughing, fatigue, headaches, myalgias and a sore throat. Pertinent negatives include no chest pain, chills or congestion. She has tried acetaminophen for the symptoms.   Cough  This is a new problem. The current episode started yesterday. The problem occurs constantly. The cough is Non-productive. Associated symptoms include ear pain, headaches, myalgias and a sore throat. Pertinent negatives include no chest pain, chills, shortness of breath, sweats or wheezing.       Past Medical History:   Diagnosis Date    Breast cancer     Cancer     breast, right '97    Cataract     Headache        Review of patient's allergies indicates:  No Known Allergies    Current Medications[1]    Review of Systems   Constitutional:  Positive for fatigue. Negative for chills.   HENT:  Positive for ear pain and sore throat. Negative for congestion.    Respiratory:  Positive for cough. Negative for shortness of breath and wheezing.    Cardiovascular:  Negative for chest pain.   Musculoskeletal:  Positive for myalgias.   Neurological:  Positive for headaches.       Objective:      BP (!) 110/58 (BP Location: Left arm, Patient Position: Sitting)   Pulse 73   Temp 98.6 °F (37 °C) (Oral)   Ht 5' 4" (1.626 m)   Wt 64.8 kg (142 lb 13.7 oz)   SpO2 98%   BMI 24.52 kg/m²   Physical Exam  HENT:      Nose:      Right Sinus: Maxillary sinus tenderness present.      Left Sinus: Maxillary sinus tenderness present.         Assessment:       1. Acute cough    2. Severe persistent asthma with allergic rhinitis with acute exacerbation        Plan:       Acute cough  -     POCT COVID-19 Rapid Screening  -     POCT Influenza A/B Molecular  Patient declines antibiotic at this " time  Severe persistent asthma with allergic rhinitis with acute exacerbation  -     fluticasone propionate (FLONASE) 50 mcg/actuation nasal spray; 1 spray (50 mcg total) by Each Nostril route once daily.  Dispense: 18 mL; Refill: 4        Time spent with patient: 20 minutes    Patient with be reevaluated in 4 weeks or sooner prn    Greater than 50% of this visit was spent counseling as described in above documentation:Yes        [1]   Current Outpatient Medications:     anastrozole (ARIMIDEX) 1 mg Tab, Take 1 tablet (1 mg total) by mouth once daily., Disp: 90 tablet, Rfl: 3    CALCIUM CARBONATE/VITAMIN D3 (CALCIUM 500 + D ORAL), Take 1 each by mouth daily as needed. , Disp: , Rfl:     dicyclomine (BENTYL) 20 mg tablet, Take 1 tablet (20 mg total) by mouth 3 (three) times daily as needed (stomache)., Disp: 90 tablet, Rfl: 1    fluocinolone acetonide oiL 0.01 % Drop, 3-4 drops to the affected ear(s) twice daily no more than a week at a time as needed for itching and scaling, Disp: 20 mL, Rfl: 3    magnesium 30 mg Tab, Take 400 mg by mouth once., Disp: , Rfl:     meloxicam (MOBIC) 15 MG tablet, Take 1 tablet (15 mg total) by mouth daily as needed for Pain., Disp: 90 tablet, Rfl: 3    montelukast (SINGULAIR) 10 mg tablet, Take 1 tablet (10 mg total) by mouth every evening., Disp: 90 tablet, Rfl: 3    multivitamin (THERAGRAN) per tablet, Take 1 tablet by mouth once daily., Disp: , Rfl:     omega-3 fatty acids/fish oil (FISH OIL-OMEGA-3 FATTY ACIDS) 300-1,000 mg capsule, Take by mouth once daily., Disp: , Rfl:     omeprazole (PRILOSEC) 40 MG capsule, Take 1 capsule (40 mg total) by mouth 2 (two) times daily before meals for 60 days, THEN 1 capsule (40 mg total) every morning., Disp: 90 capsule, Rfl: 3    oxymetazoline (AFRIN, OXYMETAZOLINE,) 0.05 % nasal spray, 2 spray tid x 3 days for severe congestion or bleeding, Disp: 30 mL, Rfl: 2    triamcinolone acetonide 0.1% (KENALOG) 0.1 % cream, Apply topically daily as  needed. , Disp: , Rfl: 3    (Magic mouthwash) 1:1:1 diphenhydrAMINE(Benadryl) 12.5mg/5ml liq, aluminum & magnesium hydroxide-simethicone (Maalox), LIDOcaine viscous 2%, Swish and spit 5 mLs 4 (four) times daily as needed (sore throat). for mouth sores (Patient not taking: Reported on 4/8/2025), Disp: 360 mL, Rfl: 0    albuterol (PROVENTIL/VENTOLIN HFA) 90 mcg/actuation inhaler, Inhale 2 puffs into the lungs 4 (four) times daily. (Patient not taking: Reported on 4/8/2025), Disp: 18 g, Rfl: 0    amoxicillin-clavulanate 875-125mg (AUGMENTIN) 875-125 mg per tablet, Take 1 tablet by mouth every 12 (twelve) hours. for 7 days, Disp: 14 tablet, Rfl: 0    fluticasone propionate (FLONASE) 50 mcg/actuation nasal spray, 1 spray (50 mcg total) by Each Nostril route once daily., Disp: 18 mL, Rfl: 4    nortriptyline (PAMELOR) 10 MG capsule, Take 1 capsule (10 mg total) by mouth every evening. (Patient not taking: Reported on 4/8/2025), Disp: 90 capsule, Rfl: 3    promethazine-dextromethorphan (PROMETHAZINE-DM) 6.25-15 mg/5 mL Syrp, TAKE 5 TO 10 ML BY MOUTH EVERY 8 HOURS AS NEEDED FOR COUGH. MAY CAUSE DROWSINESS (Patient not taking: Reported on 4/8/2025), Disp: , Rfl:     promethazine-dextromethorphan (PROMETHAZINE-DM) 6.25-15 mg/5 mL Syrp, Take 5 mLs by mouth 4 (four) times daily as needed (cough). (Patient not taking: Reported on 4/8/2025), Disp: 240 mL, Rfl: 0    Current Facility-Administered Medications:     diphenhydrAMINE injection 25 mg, 25 mg, Intravenous, Once PRN, Karthik Ford MD

## 2025-04-08 NOTE — TELEPHONE ENCOUNTER
Miguel pt via phone. Requesting same day appt with Mrs. Bingham to rule out covid. Her and her  have just returned from a cruise. Appt made. Pt confirmed appt time. Advised pt to wear a mask when checking in.

## 2025-04-09 ENCOUNTER — TELEPHONE (OUTPATIENT)
Dept: PRIMARY CARE CLINIC | Facility: CLINIC | Age: 80
End: 2025-04-09
Payer: MEDICARE

## 2025-04-09 DIAGNOSIS — R05.1 ACUTE COUGH: Primary | ICD-10-CM

## 2025-04-09 RX ORDER — AMOXICILLIN AND CLAVULANATE POTASSIUM 875; 125 MG/1; MG/1
1 TABLET, FILM COATED ORAL EVERY 12 HOURS
Qty: 14 TABLET | Refills: 0 | Status: SHIPPED | OUTPATIENT
Start: 2025-04-09 | End: 2025-04-16

## 2025-04-09 NOTE — TELEPHONE ENCOUNTER
----- Message from Shana sent at 4/9/2025 11:40 AM CDT -----  Regarding: advice  Contact: patient  Type: Needs Medical AdviceWho Called:  patientSymptoms (please be specific):  How long has patient had these symptoms:  Pharmacy name and phone #: [id number] - [place] - [address] AT [facility] [id number] [facility] [id number]: [phone] Fax: [phone] Call Back Number: [phone] (home) Additional Information: Please send a prescription for antibiotic and cough medication.  Thanks!

## 2025-04-09 NOTE — TELEPHONE ENCOUNTER
Pt seen yesterday in office for possible Covid. She is asking for antibiotic and cough syrup to be sent. Please advise.

## 2025-04-16 ENCOUNTER — OFFICE VISIT (OUTPATIENT)
Dept: OTOLARYNGOLOGY | Facility: CLINIC | Age: 80
End: 2025-04-16
Payer: MEDICARE

## 2025-04-16 ENCOUNTER — OFFICE VISIT (OUTPATIENT)
Dept: GASTROENTEROLOGY | Facility: CLINIC | Age: 80
End: 2025-04-16
Payer: MEDICARE

## 2025-04-16 VITALS — HEIGHT: 64 IN | WEIGHT: 142.63 LBS | RESPIRATION RATE: 18 BRPM | BODY MASS INDEX: 24.35 KG/M2

## 2025-04-16 VITALS
SYSTOLIC BLOOD PRESSURE: 121 MMHG | DIASTOLIC BLOOD PRESSURE: 57 MMHG | BODY MASS INDEX: 24.32 KG/M2 | HEIGHT: 64 IN | HEART RATE: 56 BPM | WEIGHT: 142.44 LBS

## 2025-04-16 DIAGNOSIS — H92.01 REFERRED OTALGIA, RIGHT: Primary | ICD-10-CM

## 2025-04-16 DIAGNOSIS — H60.90 OTITIS EXTERNA, UNSPECIFIED CHRONICITY, UNSPECIFIED LATERALITY, UNSPECIFIED TYPE: ICD-10-CM

## 2025-04-16 DIAGNOSIS — H91.13 PRESBYCUSIS, BILATERAL: ICD-10-CM

## 2025-04-16 DIAGNOSIS — H93.8X1 EAR FULLNESS, RIGHT: ICD-10-CM

## 2025-04-16 DIAGNOSIS — H61.21 EXCESSIVE CERUMEN IN EAR CANAL, RIGHT: ICD-10-CM

## 2025-04-16 DIAGNOSIS — H93.13 TINNITUS OF BOTH EARS: ICD-10-CM

## 2025-04-16 DIAGNOSIS — R10.9 STOMACH ACHE: ICD-10-CM

## 2025-04-16 DIAGNOSIS — M26.629 TMJPDS (TEMPOROMANDIBULAR JOINT PAIN DYSFUNCTION SYNDROME): ICD-10-CM

## 2025-04-16 DIAGNOSIS — L29.9 ITCHING OF EAR: ICD-10-CM

## 2025-04-16 DIAGNOSIS — R79.89 ELEVATED LFTS: Primary | ICD-10-CM

## 2025-04-16 PROCEDURE — 99214 OFFICE O/P EST MOD 30 MIN: CPT | Mod: S$GLB,,, | Performed by: INTERNAL MEDICINE

## 2025-04-16 PROCEDURE — 1160F RVW MEDS BY RX/DR IN RCRD: CPT | Mod: CPTII,S$GLB,, | Performed by: INTERNAL MEDICINE

## 2025-04-16 PROCEDURE — 99999 PR PBB SHADOW E&M-EST. PATIENT-LVL III: CPT | Mod: PBBFAC,,, | Performed by: INTERNAL MEDICINE

## 2025-04-16 PROCEDURE — 3288F FALL RISK ASSESSMENT DOCD: CPT | Mod: CPTII,S$GLB,, | Performed by: INTERNAL MEDICINE

## 2025-04-16 PROCEDURE — 99999 PR PBB SHADOW E&M-EST. PATIENT-LVL IV: CPT | Mod: PBBFAC,,, | Performed by: OTOLARYNGOLOGY

## 2025-04-16 PROCEDURE — 1159F MED LIST DOCD IN RCRD: CPT | Mod: CPTII,S$GLB,, | Performed by: INTERNAL MEDICINE

## 2025-04-16 PROCEDURE — 1126F AMNT PAIN NOTED NONE PRSNT: CPT | Mod: CPTII,S$GLB,, | Performed by: INTERNAL MEDICINE

## 2025-04-16 PROCEDURE — 1101F PT FALLS ASSESS-DOCD LE1/YR: CPT | Mod: CPTII,S$GLB,, | Performed by: INTERNAL MEDICINE

## 2025-04-16 RX ORDER — MINERAL OIL 1000 MG/ML
LIQUID TOPICAL
Start: 2025-04-16

## 2025-04-16 RX ORDER — OMEPRAZOLE 40 MG/1
CAPSULE, DELAYED RELEASE ORAL
Qty: 135 CAPSULE | Refills: 1 | Status: SHIPPED | OUTPATIENT
Start: 2025-04-16 | End: 2025-08-14

## 2025-04-16 RX ORDER — FLUOCINOLONE ACETONIDE 0.11 MG/ML
OIL AURICULAR (OTIC)
Qty: 20 ML | Refills: 3 | Status: SHIPPED | OUTPATIENT
Start: 2025-04-16

## 2025-04-16 RX ORDER — DICYCLOMINE HYDROCHLORIDE 20 MG/1
20 TABLET ORAL 3 TIMES DAILY PRN
Qty: 90 TABLET | Refills: 1 | Status: SHIPPED | OUTPATIENT
Start: 2025-04-16

## 2025-04-16 NOTE — PROCEDURES
EAR DEBRIDEMENT / CERUMEN DISIMPACTION, 45475     Indication: Excessive cerumen with symptoms, limiting complete ear exam    Side: right    Findings: See physical exam    Procedure: Ear canal(s) cleared completely using alligator forceps with microscopy.  Wax was not hydrolyzed with peroxide.  Topical medication was not applied.    Complications: none

## 2025-04-16 NOTE — PATIENT INSTRUCTIONS
DERMOTIC/FLUOCINOLONE OIL    Use prescribed fluocinolone/derm otic oil to both ears smearing around the outer ear scaling areas as well as down in the ear canal twice daily for a week no more than 2. At this point follow a routine ear care as outlined.  If not improved with this dose of steroid a higher concentration steroid may prove necessary. If medication is not well covered by insurance consider using good Rx which often bring surprise way down.      ROUTINE EAR CARE    Keep the ears dry in general.  Water and soap dry the ears increases scaling by stripping away the natural oils of the ears.    A twisted single ply of facial tissue can be used to wick out moisture on the rare occasion when water gets stuck in the ear; or the water can be displaced with concentrated alcohol like OTC SwimEar or a few drops of 72-95% isopropyl alcohol to fill the ear canal.  Regular use will cause excess drying of the ears.    The ear should be kept moist in general with mineral oil. Three to four drops into the ear canal 2 to 3 times a week or even every night.    If the ears need to be irrigated use either a 50 50 mixture of white vinegar and distilled water or 50 50 mixture of alcohol and white vinegar.    Painful draining ears that do not resolve with conservative care could represent infection and may need microscopic office debridement/clearing of the wax, and topical antibiotic drops with or without steroids may need to be prescribed.      TMJ Syndrome / Sprain    This is a condition with chronic or recurrent pain in the joint of the jaw (in front of the ear). Just like all other joints in the body (knees, hips, etc.) the jaw joint can be sprained or chronically injured over time. The jaw joint capsule can wear out just like other joints in the body.    The pain may cause limited motion of the jaw, a locking or catching sensation, clicking, popping, or grinding sounds from the joint with movement. It is a very common  cause of headache, earache or neck pain. Other symptoms include ringing in the ear, and pressure/fullness of the ear.    The nerve that gives sensation to the jaw joint also gives sensation to the ear and part of the face. This is why pain in the face or ear can be coming from the jaw joint. It is sometimes caused by inflammation in the joint, injury or wear-and-tear of the cartilage in the joint, involuntary grinding of the teeth or poorly fitting dentures. Emotional stress and tension are often a factor. Most cases resolve completely within a few months with proper treatment.    Home Care:  1) Rest the jaw by avoiding crunchy or hard foods to chew. Do not eat hard or sticky candies. Soft foods and liquids are easier on the jaw. Protect your jaw while yawning.  2) Hot compress (small towel soaked in hot water or heating pad) applied to the jaw may give relief by reducing muscle spasm. Some people get relief with cold packs, so try both and see which one works best for you.  3) You may use ibuprofen (Motrin, Advil) to control pain, unless another medicine was prescribed.   If you weight between 130 and 150 lb, you may take 600 mg of Ibuprofen every 6 hours as needed for a few weeks, then less frequently following this. If you weight > 150 lb, you may take 800 mg every 6 hours for the same time period. Extended use of Ibuprofen is typically OK, but not every 6 hours (usually one or two times per day.)   [ NOTE : If you have chronic liver or kidney disease or ever had a stomach ulcer or GI bleeding, talk with your doctor before using these medicines.]  4) If you suspect emotional stress is related to your condition.  a) Try to identify the sources of stress in your life. It may not be obvious! These may include:  -- Daily hassles of life that pile up (traffic jams, missed appointments, car troubles)  -- Major life changes, both good (new baby, job promotion) and bad (loss of job, loss of loved one)  -- Overload:  "feeling that you have too many responsibilities and can't take care of everything at once  -- Helplessness: feeling like your problems are more than you can solve  b) When possible, do something about the source of your stress: avoid hassles, limit the amount of change that is happening in your life at one time and take a break when you feel overloaded.  c) Unfortunately, many stressful situations cannot be avoided. Therefore, it is necessary to learn HOW TO MANAGE STRESS better. There are many proven methods that work and will reduce your anxiety. These include simple things like exercise, good nutrition and adequate rest. Also, there are certain techniques that are helpful: relaxation and breathing exercises, visualization, biofeedback, meditation or simply taking some time-out to clear your mind. For more information about this, consult your doctor or go to a local bookstore and review the many books and tapes available on this subject.    Mouthguards for Grinding:  Some TMJ issues are worsened by nightly teeth grinding. This can create muscle tension and strain on the jaw joint. Most mouthguards protect the teeth and do NOT reduce the clenching. If the goal is to reduce clenching, I recommend trying an over the counter nightguard called "SmartGuard."  This can be purchased over-the-counter and is available through vendors such as [employer] and [employer].   This mouthguard fits only on the front teeth. As a result, it prevents your molars from contacting, preventing a forceful clench. This should be worn for brief period of time (weeks or months) as it can alter the bite with prolonged use. Most people will notice improvement during this time.      Return with any worsening of symptoms, failure to improve, or any other concerns for further evaluation and treatment.      Voice recognition software was used in the creation of this note/communication and any sound-alike errors which may have occurred from its use should " be taken in context when interpreting.  If such errors prevent a clear understanding of the note/communication, please contact the office for clarification.

## 2025-04-16 NOTE — PROGRESS NOTES
[facility]    Subjective:      Patient: Liliana Hairston Patient PCP: Karthik Ford MD         :  1945     Sex:  female      MRN:  [id number]          Date of Visit: 2025      Chief Complaint: Ear Problem (Patient presents with right ear pain, States it has been on and off for 1 year. Seen pcp Dr. Bingham 25 and was prescribed antibiotics, completed and states it gave some relief. States that it feels like she has something in ear but denies any hearing loss./No audiogram scheduled)      2025 established patient visit Liliana Hairston is a 79 y.o. female lifelong NON-smoker with a prior visit with our office in [place] in  which showed significant bilateral sloping sensorineural hearing loss consistent with presbycusis bilateral without asymmetry who returns today last seen a year and a half ago with TMJ related referred otalgia, sloping sensorineural hearing loss and some mild chronic otitis externa treated with derm otic.  She returns today with continued right ear pain for the last year often on treated with antibiotics with some relief.    No repeat audiogram since .    No recent imaging.  Last head imaging was MRI brain with and without contrast 2021 for new onset daily headache which reports no acute abnormality some volume loss and white matter changes only.  I have reviewed those images today from over 4 years ago specifically in axial T2 which show no increased T2 signal of any note of the mastoids middle ears.  Slight increased T2 signal in the ethmoids with what looks like a left-greater-than-right small maria victoria bullosa and right septal deviation.  No destructive process of the skull base or in the area of the TMJ.  Limited view of the IAC's shows no mass or asymmetry              2023 follow-up visit: Liliana Hairston is a 77 y.o. female lifelong NON-smoker with a prior visit with our office in [place] in  which showed significant bilateral sloping  sensorineural hearing loss consistent with presbycusis bilateral without asymmetry who returns today after initial consultation 06/15/2023 with complaints of bilateral tinnitus with previously documented sloping sensorineural hearing loss without marked asymmetry in 2020 as well as some complaints of ear pain which responded to topical steroids physical findings and history consistent with some ear fullness associated to bruxism/TMJ PDS.  Information and treatment options discussed and provided.  Audiogram with essentially stable pattern of hearing loss some slight worsening bilaterally at all frequencies with an increased symmetry.  Normal tympanometry and no evidence of conductive hearing loss.  Patient did use feel tinnitus perhaps even worse.  She only really hears it in quiet environments.  It is not causing anxiety or depression.  She has not discussed her TMJ with her dental professional.  She is wearing her mouth guard.  She is not undergone physical therapy.  It remains unchanged in terms of her right ear fullness.  She has had some relief of itching/discomfort with her topical steroids.           Patient ID: Liliana Hairston is a 77 y.o. female lifelong NON-smoker with a prior visit with our office in [place] in 2020 which showed significant bilateral sloping sensorineural hearing loss consistent with presbycusis bilateral without asymmetry who returns today with complaints of worsening years long tinnitus most notable in just last 2 months without any focal change in hearing or asymmetry.  She does report some itchiness and even pain in the right side which is not completely resolved with betamethasone to the right ear.  No audiogram today.  Audiogram from 2020 reviewed as above.    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  She feels there has been some worsening of her right ear fullness.  She was prescribed betamethasone valerate for itching which he is used some  without resolution.  No sudden change in hearing.  Had a URI 6 or so weeks ago but did not have any specific change in hearing or fullness with that episode.        Review of Systems     Past Medical History  She has a past medical history of Breast cancer, Cancer, Cataract, and Headache.    Family / Surgical / Social History  Her family history includes ALS in her sister; Alzheimer's disease in her maternal grandmother; Breast cancer in her maternal aunt; Cancer in her maternal aunt and mother; Colon cancer in her mother; Diverticulitis in her son; Heart disease in her maternal uncle; No Known Problems in her brother and father.    Past Surgical History:   Procedure Laterality Date    APPENDECTOMY      BREAST SURGERY  1997    right mastectomy    COLONOSCOPY N/A 2/21/2018    Procedure: COLONOSCOPY;  Surgeon: Cristel Fritz MD;  Location: [facility];  Service: Endoscopy;  Laterality: N/A;    ESOPHAGOGASTRODUODENOSCOPY N/A 12/11/2024    Procedure: EGD (ESOPHAGOGASTRODUODENOSCOPY);  Surgeon: Cristel Fritz MD;  Location: [facility];  Service: Endoscopy;  Laterality: N/A;    EYE SURGERY      HYSTERECTOMY      LUNG SURGERY  1998    remove cyst on top of right lobe    right mastectomy  2015       Social History     Tobacco Use    Smoking status: Never    Smokeless tobacco: Never   Substance and Sexual Activity    Alcohol use: Yes     Alcohol/week: 4.0 standard drinks of alcohol     Types: 2 Glasses of wine, 2 Cans of beer per week     Comment: 2x per week wine or beer    Drug use: No    Sexual activity: Not on file       Medications  She has a current medication list which includes the following prescription(s): amoxicillin-clavulanate 875-125mg, anastrozole, calcium carbonate/vitamin d3, dicyclomine, fluocinolone acetonide oil, fluticasone propionate, magnesium, meloxicam, montelukast, multivitamin, fish oil-omega-3 fatty acids, omeprazole, oxymetazoline, triamcinolone acetonide 0.1%, diphenhydramine hcl,  "albuterol, nortriptyline, promethazine-dextromethorphan, and promethazine-dextromethorphan, and the following Facility-Administered Medications: diphenhydramine.      Allergies  Review of patient's allergies indicates:  No Known Allergies    All medications, allergies, and past history have been reviewed.    Objective:      Vitals:      2/21/2025     8:53 AM 4/8/2025     4:20 PM 4/16/2025     9:30 AM   Vitals - 1 value per visit   SYSTOLIC 128 110 121   DIASTOLIC 75 58 57   Pulse 67 73 56   Temp 97.3 °F (36.3 °C) 98.6 °F (37 °C)    Resp 17     SPO2  98 %    Weight (lb) 144.07 142.86 142.42   Weight (kg) 65.35 64.8 64.6   Height 5' 4" (1.626 m) 5' 4" (1.626 m) 5' 4" (1.626 m)   BMI (Calculated) 24.7 24.5 24.4   Pain Score Two Zero Three       Body surface area is 1.71 meters squared.    Physical Exam:    GENERAL  APPEARANCE -  alert, appears stated age, and cooperative  BARRIER(S) TO COMMUNICATION -  none VOICE - appropriate for age and gender    INTEGUMENTARY  no suspicious head and neck lesions    HEENT  HEAD: Normocephalic, without obvious abnormality, atraumatic  FACE: INSPECTION - Symmetric, no signs of trauma, no suspicious lesion(s)  PALPATION -  No masses TMJ with worsened/notable right crepitus w/o gross pain, slight left pop, asymmetry with drift to the right SALIVARY GLANDS - non-tender with no appreciable mass  STRENGTH - facial symmetry  NECK/THYROID: normal atraumatic, no neck masses, normal thyroid, no jvd    EYES  Normal occular alignment and mobility with no visible nystagmus at rest    EARS/NOSE/MOUTH/THROAT  EARS  PINNAE AND EXTERNAL EARS - no suspicious lesion OTOSCOPIC EXAM (surgical microscopy was not used for visualization/instrumentation): EAR EXAM - Normal ear canals, tympanic membranes and mobility, and middle ear spaces bilaterally.  Dry skin cleared from the right ear canal for visualization.  Generally dry with lack of wax.  Even very delicate removal of dry skin from the anterior canal " wall was very triggering for the patient.  HEARING - grossly intact to voice/finger rub    NOSE AND SINUSES  EXTERNAL NOSE - Grossly normal for age/sex  SEPTUM - normal/no obstruction on anterior exam without decongestion TURBINATES - within normal limits MUCOSA - within normal limits     MOUTH AND THROAT   ORAL CAVITY, LIPS, TEETH, GUMS & TONGUE - moist, no suspicious lesions  OROPHARYNX /TONSILS/PHARYNGEAL WALLS/HYPOPHARYNX - no erythema or exudates  NASOPHARYNX - limited mirror exam - unable to visualize due to anatomy/gag  LARYNX -  - limited mirror exam - unable to visualize due to anatomy/gag      CHEST AND LUNG   INSPECTION & AUSCULTATION - normal effort, no stridor    CARDIOVASCULAR  AUSCULTATION & PERIPHERAL VASCULAR - regular rate and rhythm.    NEUROLOGIC  MENTAL STATUS - alert, interactive CRANIAL NERVES - normal    LYMPHATIC  HEAD AND NECK - non-palpable      Procedure(s):  None    Labs:  WBC   Date Value Ref Range Status   11/27/2024 9.49 3.90 - 12.70 K/uL Final     Hemoglobin   Date Value Ref Range Status   11/27/2024 13.7 12.0 - 16.0 g/dL Final     Platelets   Date Value Ref Range Status   11/27/2024 310 150 - 450 K/uL Final     Creatinine   Date Value Ref Range Status   11/27/2024 0.5 0.5 - 1.4 mg/dL Final     TSH   Date Value Ref Range Status   11/14/2023 1.467 0.400 - 4.000 uIU/mL Final     Glucose   Date Value Ref Range Status   11/27/2024 108 70 - 110 mg/dL Final     Hemoglobin A1C   Date Value Ref Range Status   11/14/2023 5.3 4.0 - 5.6 % Final     Comment:     ADA Screening Guidelines:  5.7-6.4%  Consistent with prediabetes  >or=6.5%  Consistent with diabetes    High levels of fetal hemoglobin interfere with the HbA1C  assay. Heterozygous hemoglobin variants (HbS, HgC, etc)do  not significantly interfere with this assay.   However, presence of multiple variants may affect accuracy.           Assessment:      Problem List Items Addressed This Visit          ENT    Tinnitus of both ears      Other Visit Diagnoses         Referred otalgia, right    -  Primary      Ear fullness, right          TMJPDS (temporomandibular joint pain dysfunction syndrome)          Presbycusis, bilateral          Itching of ear          Excessive cerumen in ear canal, right          Otitis externa, unspecified chronicity, unspecified laterality, unspecified type                         Plan:      Information again provided on derm otic and routine ear care.  Recommended more aggressive management of TMJ.  May need physical therapy, occlusal guard, steroid or even Botox injections.      Follow up as needed

## 2025-04-16 NOTE — PROGRESS NOTES
"[facility] Note    CC: GERD     HPI 79 y.o. female presents for follow up of moderate, intermittent GERD that is worse after eating, not associated with dysphagia. She underwent EGD in December 2024 which was notable for LA Grade B esophagitis, small hiatal hernia and H.pylori negative gastritis. I that time she was prescribed PPI BID x 8 weeks then daily however did not take this medication and is not taking a PPI at this time. She was noted to have new acute elevation in LFTs in November 2024, unsure if she was taking an antibiotic/ill at that time. She does not drink ETOH or take frequent Acetaminophen. She denies abdominal pain and jaundice.        Past Medical History:   Diagnosis Date    Breast cancer     Cancer     breast, right '97    Cataract     Headache        Allergies and Medications reviewed     Review of Systems  General ROS: negative for - chills, fever or weight loss  Cardiovascular ROS: no chest pain or dyspnea on exertion  Gastrointestinal ROS: + intermittent GERD , no jaundice, no diarrhea    Physical Examination  Resp 18   Ht 5' 4" (1.626 m)   Wt 64.7 kg (142 lb 10.2 oz)   BMI 24.48 kg/m²   General appearance: alert, cooperative, no distress  HENT: Normocephalic, atraumatic, neck symmetrical, no nasal discharge, sclera anicteric   Lungs: clear to auscultation bilaterally, symmetric chest wall expansion bilaterally  Heart: regular rate and rhythm without rub; no displacement of the PMI   Abdomen: soft, nontender,nondistended, BS active, no hepatomegaly   Extremities: extremities symmetric; no clubbing, cyanosis, or edema        Labs:  Lab Results   Component Value Date    WBC 9.49 11/27/2024    HGB 13.7 11/27/2024    HCT 40.9 11/27/2024    MCV 94 11/27/2024     11/27/2024 November 2024- AST- 39, ALT- 165, Alk Phos- 156, Bili- 0.5, Albumin 4.3  May 2024- AST- 16, ALT- 13, Alk Phos- 89, Bili - 0.4        Assessment:   79 y.o. female presents for follow up of mild, " intermittent GERD/reflux esophagitis as well as acute elevation in LFTs several months ago.     Plan:  -Take Omeprazole 40 mg BID x8 weeks (to treat esophagitis seen on endoscopy) then decrease to daily dosage  -Repeat CMP, check abdominal ultrasound  -RTC 4 weeks    Cristel Fritz MD  [facility]  [address], [address]  [place] [address]  Office: [phone]  Fax: [phone]

## 2025-04-22 ENCOUNTER — HOSPITAL ENCOUNTER (OUTPATIENT)
Dept: RADIOLOGY | Facility: HOSPITAL | Age: 80
Discharge: HOME | End: 2025-04-22
Attending: INTERNAL MEDICINE
Payer: MEDICARE

## 2025-04-22 DIAGNOSIS — R79.89 ELEVATED LFTS: ICD-10-CM

## 2025-04-22 PROCEDURE — 76700 US EXAM ABDOM COMPLETE: CPT | Mod: 26,,, | Performed by: RADIOLOGY

## 2025-04-22 PROCEDURE — 76700 US EXAM ABDOM COMPLETE: CPT | Mod: TC

## 2025-04-24 ENCOUNTER — RESULTS FOLLOW-UP (OUTPATIENT)
Dept: GASTROENTEROLOGY | Facility: HOSPITAL | Age: 80
End: 2025-04-24

## 2025-05-19 ENCOUNTER — PATIENT MESSAGE (OUTPATIENT)
Dept: FAMILY MEDICINE | Facility: CLINIC | Age: 80
End: 2025-05-19
Payer: MEDICARE

## 2025-05-27 ENCOUNTER — LAB VISIT (OUTPATIENT)
Dept: LAB | Facility: HOSPITAL | Age: 80
End: 2025-05-27
Attending: INTERNAL MEDICINE
Payer: MEDICARE

## 2025-05-27 DIAGNOSIS — C80.1 DILATED CARDIOMYOPATHY SECONDARY TO MALIGNANCY: ICD-10-CM

## 2025-05-27 DIAGNOSIS — I42.0 DILATED CARDIOMYOPATHY SECONDARY TO MALIGNANCY: ICD-10-CM

## 2025-05-27 DIAGNOSIS — C50.411 MALIGNANT NEOPLASM OF UPPER-OUTER QUADRANT OF RIGHT FEMALE BREAST: Primary | ICD-10-CM

## 2025-05-27 LAB
ABSOLUTE EOSINOPHIL (SMH): 0.1 K/UL
ABSOLUTE MONOCYTE (SMH): 0.57 K/UL (ref 0.3–1)
ABSOLUTE NEUTROPHIL COUNT (SMH): 3.9 K/UL (ref 1.8–7.7)
ALBUMIN SERPL-MCNC: 4.3 G/DL (ref 3.5–5.2)
ALP SERPL-CCNC: 77 UNIT/L (ref 55–135)
ALT SERPL-CCNC: 14 UNIT/L (ref 10–44)
ANION GAP (SMH): 8 MMOL/L (ref 8–16)
AST SERPL-CCNC: 18 UNIT/L (ref 10–40)
BASOPHILS # BLD AUTO: 0.03 K/UL
BASOPHILS NFR BLD AUTO: 0.5 %
BILIRUB SERPL-MCNC: 0.3 MG/DL (ref 0.1–1)
BUN SERPL-MCNC: 12 MG/DL (ref 8–23)
CALCIUM SERPL-MCNC: 9.3 MG/DL (ref 8.7–10.5)
CARCINOEMBRYONIC ANTIGEN (SMH): 0.9 NG/ML
CHLORIDE SERPL-SCNC: 105 MMOL/L (ref 95–110)
CO2 SERPL-SCNC: 25 MMOL/L (ref 23–29)
CREAT SERPL-MCNC: 0.6 MG/DL (ref 0.5–1.4)
ERYTHROCYTE [DISTWIDTH] IN BLOOD BY AUTOMATED COUNT: 12.4 % (ref 11.5–14.5)
GFR SERPLBLD CREATININE-BSD FMLA CKD-EPI: >60 ML/MIN/1.73/M2
GLUCOSE SERPL-MCNC: 103 MG/DL (ref 70–110)
HCT VFR BLD AUTO: 40.6 % (ref 37–48.5)
HGB BLD-MCNC: 13.3 GM/DL (ref 12–16)
IMM GRANULOCYTES # BLD AUTO: 0.01 K/UL (ref 0–0.04)
IMM GRANULOCYTES NFR BLD AUTO: 0.2 % (ref 0–0.5)
LYMPHOCYTES # BLD AUTO: 1.93 K/UL (ref 1–4.8)
MCH RBC QN AUTO: 30.9 PG (ref 27–31)
MCHC RBC AUTO-ENTMCNC: 32.8 G/DL (ref 32–36)
MCV RBC AUTO: 94 FL (ref 82–98)
NUCLEATED RBC (/100WBC) (SMH): 0 /100 WBC
PLATELET # BLD AUTO: 279 K/UL (ref 150–450)
PMV BLD AUTO: 9.1 FL (ref 9.2–12.9)
POTASSIUM SERPL-SCNC: 3.7 MMOL/L (ref 3.5–5.1)
PROT SERPL-MCNC: 7.2 GM/DL (ref 6–8.4)
RBC # BLD AUTO: 4.31 M/UL (ref 4–5.4)
RELATIVE EOSINOPHIL (SMH): 1.5 % (ref 0–8)
RELATIVE LYMPHOCYTE (SMH): 29.3 % (ref 18–48)
RELATIVE MONOCYTE (SMH): 8.7 % (ref 4–15)
RELATIVE NEUTROPHIL (SMH): 59.8 % (ref 38–73)
SODIUM SERPL-SCNC: 138 MMOL/L (ref 136–145)
WBC # BLD AUTO: 6.58 K/UL (ref 3.9–12.7)

## 2025-05-27 PROCEDURE — 36415 COLL VENOUS BLD VENIPUNCTURE: CPT

## 2025-05-27 PROCEDURE — 86300 IMMUNOASSAY TUMOR CA 15-3: CPT | Mod: 59

## 2025-05-27 PROCEDURE — 82378 CARCINOEMBRYONIC ANTIGEN: CPT

## 2025-05-27 PROCEDURE — 86300 IMMUNOASSAY TUMOR CA 15-3: CPT

## 2025-05-27 PROCEDURE — 85025 COMPLETE CBC W/AUTO DIFF WBC: CPT

## 2025-05-27 PROCEDURE — 82040 ASSAY OF SERUM ALBUMIN: CPT

## 2025-05-29 LAB
CANCER AG15-3 SERPL-ACNC: 21.6 U/ML (ref 0–25)
CANCER AG27-29 SERPL-ACNC: 21.5 U/ML (ref 0–38.6)

## 2025-06-17 RX ORDER — OMEPRAZOLE 40 MG/1
40 CAPSULE, DELAYED RELEASE ORAL EVERY MORNING
Qty: 90 CAPSULE | Refills: 1 | Status: SHIPPED | OUTPATIENT
Start: 2025-06-17

## 2025-08-06 ENCOUNTER — OFFICE VISIT (OUTPATIENT)
Dept: GASTROENTEROLOGY | Facility: CLINIC | Age: 80
End: 2025-08-06
Payer: MEDICARE

## 2025-08-06 VITALS — WEIGHT: 140.19 LBS | HEIGHT: 64 IN | BODY MASS INDEX: 23.93 KG/M2 | RESPIRATION RATE: 18 BRPM

## 2025-08-06 DIAGNOSIS — R10.10 PAIN OF UPPER ABDOMEN: Primary | ICD-10-CM

## 2025-08-06 DIAGNOSIS — R10.9 STOMACH ACHE: ICD-10-CM

## 2025-08-06 PROCEDURE — 1101F PT FALLS ASSESS-DOCD LE1/YR: CPT | Mod: CPTII,S$GLB,, | Performed by: INTERNAL MEDICINE

## 2025-08-06 PROCEDURE — 1160F RVW MEDS BY RX/DR IN RCRD: CPT | Mod: CPTII,S$GLB,, | Performed by: INTERNAL MEDICINE

## 2025-08-06 PROCEDURE — 99214 OFFICE O/P EST MOD 30 MIN: CPT | Mod: S$GLB,,, | Performed by: INTERNAL MEDICINE

## 2025-08-06 PROCEDURE — 99999 PR PBB SHADOW E&M-EST. PATIENT-LVL III: CPT | Mod: PBBFAC,,, | Performed by: INTERNAL MEDICINE

## 2025-08-06 PROCEDURE — 1159F MED LIST DOCD IN RCRD: CPT | Mod: CPTII,S$GLB,, | Performed by: INTERNAL MEDICINE

## 2025-08-06 PROCEDURE — 3288F FALL RISK ASSESSMENT DOCD: CPT | Mod: CPTII,S$GLB,, | Performed by: INTERNAL MEDICINE

## 2025-08-06 PROCEDURE — 1126F AMNT PAIN NOTED NONE PRSNT: CPT | Mod: CPTII,S$GLB,, | Performed by: INTERNAL MEDICINE

## 2025-08-12 RX ORDER — DICYCLOMINE HYDROCHLORIDE 20 MG/1
20 TABLET ORAL 3 TIMES DAILY PRN
Qty: 90 TABLET | Refills: 1 | Status: SHIPPED | OUTPATIENT
Start: 2025-08-12

## 2025-08-12 RX ORDER — OMEPRAZOLE 40 MG/1
40 CAPSULE, DELAYED RELEASE ORAL EVERY MORNING
Qty: 90 CAPSULE | Refills: 2 | Status: SHIPPED | OUTPATIENT
Start: 2025-08-12